# Patient Record
Sex: MALE | Race: WHITE | NOT HISPANIC OR LATINO | Employment: OTHER | URBAN - METROPOLITAN AREA
[De-identification: names, ages, dates, MRNs, and addresses within clinical notes are randomized per-mention and may not be internally consistent; named-entity substitution may affect disease eponyms.]

---

## 2023-03-16 ENCOUNTER — TELEMEDICINE (OUTPATIENT)
Dept: PHARMACY | Facility: HOSPITAL | Age: 63
End: 2023-03-16
Payer: COMMERCIAL

## 2023-03-16 DIAGNOSIS — Z79.4 TYPE 2 DIABETES MELLITUS WITHOUT COMPLICATION, WITH LONG-TERM CURRENT USE OF INSULIN (MULTI): ICD-10-CM

## 2023-03-16 DIAGNOSIS — E11.9 TYPE 2 DIABETES MELLITUS WITHOUT COMPLICATION, WITH LONG-TERM CURRENT USE OF INSULIN (MULTI): ICD-10-CM

## 2023-03-16 DIAGNOSIS — Z79.4 TYPE 2 DIABETES MELLITUS WITHOUT COMPLICATION, WITH LONG-TERM CURRENT USE OF INSULIN (MULTI): Primary | ICD-10-CM

## 2023-03-16 DIAGNOSIS — E11.9 TYPE 2 DIABETES MELLITUS WITHOUT COMPLICATION, WITH LONG-TERM CURRENT USE OF INSULIN (MULTI): Primary | ICD-10-CM

## 2023-03-16 RX ORDER — LANCETS 30 GAUGE
EACH MISCELLANEOUS
COMMUNITY
Start: 2023-02-22

## 2023-03-16 RX ORDER — DULAGLUTIDE 4.5 MG/.5ML
4.5 INJECTION, SOLUTION SUBCUTANEOUS
Qty: 2 ML | Refills: 11 | Status: SHIPPED | OUTPATIENT
Start: 2023-03-16 | End: 2023-03-16

## 2023-03-16 RX ORDER — EMPAGLIFLOZIN 25 MG/1
25 TABLET, FILM COATED ORAL DAILY
COMMUNITY
End: 2023-05-16 | Stop reason: SDUPTHER

## 2023-03-16 RX ORDER — DULAGLUTIDE 3 MG/.5ML
INJECTION, SOLUTION SUBCUTANEOUS
COMMUNITY
Start: 2023-02-17 | End: 2023-03-16 | Stop reason: DRUGHIGH

## 2023-03-16 RX ORDER — BLOOD SUGAR DIAGNOSTIC
STRIP MISCELLANEOUS
COMMUNITY
Start: 2023-02-22

## 2023-03-16 RX ORDER — NITROGLYCERIN 0.4 MG/1
TABLET SUBLINGUAL
COMMUNITY
Start: 2021-07-29

## 2023-03-16 RX ORDER — LANCETS 33 GAUGE
EACH MISCELLANEOUS
COMMUNITY
Start: 2023-02-22

## 2023-03-16 RX ORDER — ASPIRIN 81 MG/1
1 TABLET ORAL DAILY
COMMUNITY
Start: 2021-07-23

## 2023-03-16 RX ORDER — LOSARTAN POTASSIUM 100 MG/1
1 TABLET ORAL DAILY
COMMUNITY
Start: 2023-02-02 | End: 2023-11-14

## 2023-03-16 RX ORDER — INSULIN GLARGINE 100 [IU]/ML
10 INJECTION, SOLUTION SUBCUTANEOUS NIGHTLY
COMMUNITY
Start: 2021-07-23 | End: 2023-07-12 | Stop reason: ALTCHOICE

## 2023-03-16 RX ORDER — METOPROLOL TARTRATE 25 MG/1
25 TABLET, FILM COATED ORAL 2 TIMES DAILY
COMMUNITY
End: 2023-09-15

## 2023-03-16 RX ORDER — PEN NEEDLE, DIABETIC 32GX 5/32"
NEEDLE, DISPOSABLE MISCELLANEOUS
COMMUNITY
Start: 2022-08-30 | End: 2023-07-12 | Stop reason: ALTCHOICE

## 2023-03-16 RX ORDER — ATORVASTATIN CALCIUM 80 MG/1
80 TABLET, FILM COATED ORAL DAILY
COMMUNITY
End: 2024-06-04 | Stop reason: WASHOUT

## 2023-03-16 ASSESSMENT — ENCOUNTER SYMPTOMS: DIABETIC ASSOCIATED SYMPTOMS: 0

## 2023-03-16 NOTE — PROGRESS NOTES
Pharmacy Clinic Note    Hemant Ness is a 62 y.o. male was referred to Clinical Pharmacy Team for diabetes management.     Referring Provider: Jose Juan Mukherjee,     Subjective   Allergies   Allergen Reactions    Metformin Diarrhea       [facility] - [place], [place] - [address]  [address]  [address]  [facility] [address]  Phone: [phone] Fax: [phone]      Diabetes  He presents for his follow-up diabetic visit. He has type 2 diabetes mellitus. His disease course has been improving. There are no hypoglycemic associated symptoms. There are no diabetic associated symptoms. There are no hypoglycemic complications. There are no diabetic complications. Current diabetic treatment includes oral agent (dual therapy) and insulin injections (Trulicity 3 mg). His weight is decreasing steadily. His home blood glucose trend is decreasing steadily. His breakfast blood glucose range is generally 110-130 mg/dl. His dinner blood glucose range is generally  mg/dl. His overall blood glucose range is  mg/dl. An ACE inhibitor/angiotensin II receptor blocker is being taken.       Objective     There were no vitals taken for this visit.     LAB  Lab Results   Component Value Date    BILITOT 0.6 02/14/2023    CALCIUM 9.6 02/14/2023    CO2 26 02/14/2023     02/14/2023    CREATININE 0.91 02/14/2023    GLUCOSE 102 (H) 02/14/2023    ALKPHOS 73 02/14/2023    K 4.3 02/14/2023    PROT 7.5 02/14/2023     02/14/2023    AST 30 02/14/2023    ALT 44 02/14/2023    BUN 20 02/14/2023    ANIONGAP 12 02/14/2023    MG 1.89 07/22/2021    ALBUMIN 4.5 02/14/2023     Lab Results   Component Value Date    TRIG 81 10/25/2022    CHOL 118 10/25/2022    HDL 37.7 (A) 10/25/2022     Lab Results   Component Value Date    HGBA1C 6.2 (A) 02/14/2023       Current Outpatient Medications on File Prior to Visit   Medication Sig Dispense Refill    aspirin 81 mg EC tablet Take 1 tablet (81 mg) by mouth once  "daily.      Lantus Solostar U-100 Insulin 100 unit/mL (3 mL) pen Inject 10 Units under the skin once daily at bedtime.      losartan (Cozaar) 100 mg tablet Take 1 tablet (100 mg) by mouth once daily.      nitroglycerin (Nitrostat) 0.4 mg SL tablet Place under the tongue.      OneTouch Delica Plus Lancet 30 gauge misc       OneTouch Ultra Test strip       OneTouch Ultra2 Meter misc       Sure Comfort Pen Needle 32 gauge x \" needle TO BE USED ONCE DAILY      [DISCONTINUED] Trulicity 3 mg/0.5 mL pen injector       atorvastatin (Lipitor) 80 mg tablet Take 1 tablet (80 mg) by mouth once daily.      Jardiance 25 mg Take 1 tablet (25 mg) by mouth once daily.      metoprolol tartrate (Lopressor) 25 mg tablet Take 1 tablet (25 mg) by mouth in the morning and 1 tablet (25 mg) before bedtime.       No current facility-administered medications on file prior to visit.        HISTORICAL PHARMACOTHERAPY  - Metformin: diarrhea (moderate-severe)    SMBG (if applicable):    Date Morning Bedtime    107     131 101   1-Mar 145    2-Mar 115    6-Mar  98   7-Mar 106 82   8-Mar 117 85   9-Mar 95 96   10-Mar 94    11-Mar 100        DRUG INTERACTIONS  - No significant drug-drug interactions exist that require change in therapy  _______________________________________________________________________  PATIENT EDUCATION/GOALS    1. Discussed disease specific goals:   - Fasting B - 130 mg/dL  - Postprandial BG: less than 180 mg/dL  - A1c: less than 7%  2.  Since last visit, patient's BG have lowered to controlled range and patient has lost ~20 ls since lowering insulin and increasing Trulicity. Given this improvement, we will discontinue the Lantus and increase Trulicity to 4.5 mg.    SECONDARY PREVENTION  - Statin? yes  - ACE-I/ARB? yes  _______________________________________________________________________    Assessment/Plan   Problem List Items Addressed This Visit    None  Visit Diagnoses       Type 2 diabetes mellitus " without complication, with long-term current use of insulin (CMS/[facility])        Relevant Medications    dulaglutide (Trulicity) 4.5 mg/0.5 mL pen injector           PLAN  1. Increase Trulicity to 4.5 mg mg: Inject one pen under the skin once weekly. Rx sent to [facility].  2. Stop Lantus 10 units once daily.  3. Continue other diabetes medications.  4. Start Onetouch Ultra testing meter, strips, and lancets  Clinical Pharmacist follow-up: 3-4 weeks    Sushant Jessica, PharmD     Verbal consent to manage patient's drug therapy was obtained from [the patient and/or an individual authorized to act on behalf of a patient]. They were informed they may decline to participate or withdraw from participation in pharmacy services at any time.

## 2023-04-05 ENCOUNTER — TELEMEDICINE (OUTPATIENT)
Dept: PHARMACY | Facility: HOSPITAL | Age: 63
End: 2023-04-05
Payer: COMMERCIAL

## 2023-04-05 DIAGNOSIS — Z79.4 TYPE 2 DIABETES MELLITUS WITHOUT COMPLICATION, WITH LONG-TERM CURRENT USE OF INSULIN (MULTI): Primary | ICD-10-CM

## 2023-04-05 DIAGNOSIS — E11.9 TYPE 2 DIABETES MELLITUS WITHOUT COMPLICATION, WITH LONG-TERM CURRENT USE OF INSULIN (MULTI): Primary | ICD-10-CM

## 2023-04-05 DIAGNOSIS — Z79.4 TYPE 2 DIABETES MELLITUS WITHOUT COMPLICATION, WITH LONG-TERM CURRENT USE OF INSULIN (MULTI): ICD-10-CM

## 2023-04-05 DIAGNOSIS — E11.9 TYPE 2 DIABETES MELLITUS WITHOUT COMPLICATION, WITH LONG-TERM CURRENT USE OF INSULIN (MULTI): ICD-10-CM

## 2023-04-05 ASSESSMENT — ENCOUNTER SYMPTOMS: DIABETIC ASSOCIATED SYMPTOMS: 0

## 2023-04-05 NOTE — PROGRESS NOTES
Pharmacy Clinic Note    Hemant Ness is a 62 y.o. male was referred to Clinical Pharmacy Team for diabetes management.     Referring Provider: Jose Juan Mukherjee DO    Subjective   Allergies   Allergen Reactions    Metformin Diarrhea       [facility] - [place], [place] - [address]  [address]  [address]  [facility] [address]  Phone: [phone] Fax: [phone]      Diabetes  He presents for his follow-up diabetic visit. He has type 2 diabetes mellitus. His disease course has been improving. There are no hypoglycemic associated symptoms. There are no diabetic associated symptoms. There are no hypoglycemic complications. There are no diabetic complications. Risk factors for coronary artery disease include diabetes mellitus, male sex and obesity. Current diabetic treatment includes oral agent (monotherapy) (Trulicity 4.5 mg and Jardiance 25 mg). He is compliant with treatment all of the time. His weight is decreasing steadily. His home blood glucose trend is decreasing steadily. His breakfast blood glucose range is generally 110-130 mg/dl. His dinner blood glucose range is generally  mg/dl. His overall blood glucose range is  mg/dl. An ACE inhibitor/angiotensin II receptor blocker is being taken.       Objective     There were no vitals taken for this visit.     LAB  Lab Results   Component Value Date    BILITOT 0.6 02/14/2023    CALCIUM 9.6 02/14/2023    CO2 26 02/14/2023     02/14/2023    CREATININE 0.91 02/14/2023    GLUCOSE 102 (H) 02/14/2023    ALKPHOS 73 02/14/2023    K 4.3 02/14/2023    PROT 7.5 02/14/2023     02/14/2023    AST 30 02/14/2023    ALT 44 02/14/2023    BUN 20 02/14/2023    ANIONGAP 12 02/14/2023    MG 1.89 07/22/2021    ALBUMIN 4.5 02/14/2023     Lab Results   Component Value Date    TRIG 81 10/25/2022    CHOL 118 10/25/2022    HDL 37.7 (A) 10/25/2022     Lab Results   Component Value Date    HGBA1C 6.2 (A) 02/14/2023       Current  "Outpatient Medications on File Prior to Visit   Medication Sig Dispense Refill    aspirin 81 mg EC tablet Take 1 tablet (81 mg) by mouth once daily.      atorvastatin (Lipitor) 80 mg tablet Take 1 tablet (80 mg) by mouth once daily.      dulaglutide (Trulicity) 4.5 mg/0.5 mL pen injector Inject 4.5 mg under the skin every 7 days. 2 mL 11    Jardiance 25 mg Take 1 tablet (25 mg) by mouth once daily.      Lantus Solostar U-100 Insulin 100 unit/mL (3 mL) pen Inject 10 Units under the skin once daily at bedtime.      losartan (Cozaar) 100 mg tablet Take 1 tablet (100 mg) by mouth once daily.      metoprolol tartrate (Lopressor) 25 mg tablet Take 1 tablet (25 mg) by mouth in the morning and 1 tablet (25 mg) before bedtime.      nitroglycerin (Nitrostat) 0.4 mg SL tablet Place under the tongue.      OneTouch Delica Plus Lancet 30 gauge misc       OneTouch Ultra Test strip       OneTouch Ultra2 Meter misc       Sure Comfort Pen Needle 32 gauge x 5/32\" needle TO BE USED ONCE DAILY       No current facility-administered medications on file prior to visit.        HISTORICAL PHARMACOTHERAPY  - Metformin: diarrhea (moderate-severe)    SMBG (if applicable):    Date Morning Bedtime   20-Mar 102 ---   24-Mar --- 103   25-Mar 103 ---   26-Mar --- ---   27-Mar 116 ---    98     Weight down to 236 lbs (previously 260 lbs)    DRUG INTERACTIONS  - No significant drug-drug interactions exist that require change in therapy  _______________________________________________________________________  PATIENT EDUCATION/GOALS    1. Discussed disease specific goals:   - Fasting B - 130 mg/dL  - Postprandial BG: less than 180 mg/dL  - A1c: less than 7%    2.  Since last visit, patient's BG remain controlled without need for Lantus. Trulicity set up for automatic refill via [facility] mail order.    SECONDARY PREVENTION  - Statin? yes  - ACE-I/ARB? " yes  _______________________________________________________________________    Assessment/Plan   Problem List Items Addressed This Visit    None  Visit Diagnoses       Type 2 diabetes mellitus without complication, with long-term current use of insulin (CMS/[facility])               PLAN  1. Continue Jardiance 25 mg and Trulcity 4.5 mg    Clinical Pharmacist follow-up: 3 months    Sushant Jessica, PharmD     Verbal consent to manage patient's drug therapy was obtained from [the patient and/or an individual authorized to act on behalf of a patient]. They were informed they may decline to participate or withdraw from participation in pharmacy services at any time.

## 2023-05-16 DIAGNOSIS — Z86.39 HISTORY OF NON-INSULIN DEPENDENT TYPE 2 DIABETES MELLITUS: ICD-10-CM

## 2023-05-16 RX ORDER — EMPAGLIFLOZIN 25 MG/1
25 TABLET, FILM COATED ORAL DAILY
Qty: 90 TABLET | Refills: 3 | Status: SHIPPED | OUTPATIENT
Start: 2023-05-16 | End: 2023-05-16

## 2023-07-12 ENCOUNTER — TELEMEDICINE (OUTPATIENT)
Dept: PHARMACY | Facility: HOSPITAL | Age: 63
End: 2023-07-12
Payer: COMMERCIAL

## 2023-07-12 DIAGNOSIS — Z79.4 TYPE 2 DIABETES MELLITUS WITHOUT COMPLICATION, WITH LONG-TERM CURRENT USE OF INSULIN (MULTI): ICD-10-CM

## 2023-07-12 DIAGNOSIS — E11.9 TYPE 2 DIABETES MELLITUS WITHOUT COMPLICATION, WITH LONG-TERM CURRENT USE OF INSULIN (MULTI): ICD-10-CM

## 2023-07-12 ASSESSMENT — ENCOUNTER SYMPTOMS: DIABETIC ASSOCIATED SYMPTOMS: 0

## 2023-07-12 NOTE — PROGRESS NOTES
Pharmacy Clinic Note    Hemant Ness is a 62 y.o. male was referred to Clinical Pharmacy Team for diabetes management.     Referring Provider: Jose Juan Mukherjee DO    Subjective   Allergies   Allergen Reactions    Metformin Diarrhea       [facility] - [place], [place] - [address]  [address]  [address]  [facility] [address]  Phone: [phone] Fax: [phone]      Diabetes  He presents for his follow-up diabetic visit. He has type 2 diabetes mellitus. His disease course has been improving. There are no hypoglycemic associated symptoms. There are no diabetic associated symptoms. There are no hypoglycemic complications. There are no diabetic complications. Risk factors for coronary artery disease include diabetes mellitus, male sex and obesity. Current diabetic treatment includes oral agent (monotherapy) (Trulicity 4.5 mg and Jardiance 25 mg). He is compliant with treatment all of the time. His weight is decreasing steadily. His home blood glucose trend is decreasing steadily. His breakfast blood glucose range is generally 110-130 mg/dl. His dinner blood glucose range is generally  mg/dl. His overall blood glucose range is  mg/dl. An ACE inhibitor/angiotensin II receptor blocker is being taken.       Objective     There were no vitals taken for this visit.     LAB  Lab Results   Component Value Date    BILITOT 0.6 02/14/2023    CALCIUM 9.6 02/14/2023    CO2 26 02/14/2023     02/14/2023    CREATININE 0.91 02/14/2023    GLUCOSE 102 (H) 02/14/2023    ALKPHOS 73 02/14/2023    K 4.3 02/14/2023    PROT 7.5 02/14/2023     02/14/2023    AST 30 02/14/2023    ALT 44 02/14/2023    BUN 20 02/14/2023    ANIONGAP 12 02/14/2023    MG 1.89 07/22/2021    ALBUMIN 4.5 02/14/2023     Lab Results   Component Value Date    TRIG 81 10/25/2022    CHOL 118 10/25/2022    HDL 37.7 (A) 10/25/2022     Lab Results   Component Value Date    HGBA1C 6.2 (A) 02/14/2023       Current  "Outpatient Medications on File Prior to Visit   Medication Sig Dispense Refill    aspirin 81 mg EC tablet Take 1 tablet (81 mg) by mouth once daily.      atorvastatin (Lipitor) 80 mg tablet Take 1 tablet (80 mg) by mouth once daily.      dulaglutide (Trulicity) 4.5 mg/0.5 mL pen injector Inject 4.5 mg under the skin every 7 days. 2 mL 11    Jardiance 25 mg Take 1 tablet (25 mg) by mouth once daily. 90 tablet 3    losartan (Cozaar) 100 mg tablet Take 1 tablet (100 mg) by mouth once daily.      metoprolol tartrate (Lopressor) 25 mg tablet Take 1 tablet (25 mg) by mouth in the morning and 1 tablet (25 mg) before bedtime.      nitroglycerin (Nitrostat) 0.4 mg SL tablet Place under the tongue.      OneTouch Delica Plus Lancet 30 gauge misc       OneTouch Ultra Test strip       OneTouch Ultra2 Meter misc       [DISCONTINUED] Lantus Solostar U-100 Insulin 100 unit/mL (3 mL) pen Inject 10 Units under the skin once daily at bedtime.      [DISCONTINUED] Sure Comfort Pen Needle 32 gauge x \" needle TO BE USED ONCE DAILY       No current facility-administered medications on file prior to visit.        HISTORICAL PHARMACOTHERAPY  - Metformin: diarrhea (moderate-severe)    Weight down to 230 lbs (previously 260 lbs)    DRUG INTERACTIONS  - No significant drug-drug interactions exist that require change in therapy  _______________________________________________________________________  PATIENT EDUCATION/GOALS    1. Discussed disease specific goals:   - Fasting B - 130 mg/dL  - Postprandial BG: less than 180 mg/dL  - A1c: less than 7%    2.  Since last visit, patient's BG remain controlled without need for Lantus. Patient to continue current therapies of Jardiance 25 mg and Trulicity 4.5 mg and follow up in 6 months    SECONDARY PREVENTION  - Statin? yes  - ACE-I/ARB? yes  _______________________________________________________________________    Assessment/Plan   Problem List Items Addressed This Visit          " Endocrine/Metabolic    Type 2 diabetes mellitus without complication, with long-term current use of insulin (CMS/[facility])     PLAN  1. Continue Jardiance 25 mg and Trulcity 4.5 mg           Continue all meds under the continuation of care with the referring provider and clinical pharmacy team.    Clinical Pharmacist follow-up: 6 months    Sushant Jessica, PharmD     Verbal consent to manage patient's drug therapy was obtained from [the patient and/or an individual authorized to act on behalf of a patient]. They were informed they may decline to participate or withdraw from participation in pharmacy services at any time.

## 2023-08-31 ENCOUNTER — TELEPHONE (OUTPATIENT)
Dept: PRIMARY CARE | Facility: CLINIC | Age: 63
End: 2023-08-31
Payer: COMMERCIAL

## 2023-08-31 DIAGNOSIS — R39.11 BENIGN PROSTATIC HYPERPLASIA WITH URINARY HESITANCY: ICD-10-CM

## 2023-08-31 DIAGNOSIS — E11.9 TYPE 2 DIABETES MELLITUS WITHOUT COMPLICATION, WITH LONG-TERM CURRENT USE OF INSULIN (MULTI): Primary | ICD-10-CM

## 2023-08-31 DIAGNOSIS — Z79.4 TYPE 2 DIABETES MELLITUS WITHOUT COMPLICATION, WITH LONG-TERM CURRENT USE OF INSULIN (MULTI): Primary | ICD-10-CM

## 2023-08-31 DIAGNOSIS — N40.1 BENIGN PROSTATIC HYPERPLASIA WITH URINARY HESITANCY: ICD-10-CM

## 2023-08-31 RX ORDER — LISINOPRIL 10 MG/1
10 TABLET ORAL DAILY
COMMUNITY
Start: 2011-11-30 | End: 2023-09-15 | Stop reason: ALTCHOICE

## 2023-09-07 PROBLEM — R97.20 BPH WITH ELEVATED PSA: Status: ACTIVE | Noted: 2023-09-07

## 2023-09-07 PROBLEM — F17.200 CURRENT SMOKER: Status: ACTIVE | Noted: 2023-09-07

## 2023-09-07 PROBLEM — I21.3 STEMI (ST ELEVATION MYOCARDIAL INFARCTION) (MULTI): Status: ACTIVE | Noted: 2023-09-07

## 2023-09-07 PROBLEM — E78.5 DYSLIPIDEMIA, GOAL LDL BELOW 70: Status: ACTIVE | Noted: 2023-09-07

## 2023-09-07 PROBLEM — L57.8 ACTINIC SKIN DAMAGE: Status: ACTIVE | Noted: 2023-09-07

## 2023-09-07 PROBLEM — G47.30 SLEEP-DISORDERED BREATHING: Status: ACTIVE | Noted: 2023-09-07

## 2023-09-07 PROBLEM — N40.0 BPH WITH ELEVATED PSA: Status: ACTIVE | Noted: 2023-09-07

## 2023-09-07 PROBLEM — I25.10 CAD (CORONARY ARTERY DISEASE): Status: ACTIVE | Noted: 2023-09-07

## 2023-09-07 PROBLEM — I10 HYPERTENSION, BENIGN: Status: ACTIVE | Noted: 2023-09-07

## 2023-09-07 PROBLEM — F10.20 UNCOMPLICATED ALCOHOL DEPENDENCE (MULTI): Status: ACTIVE | Noted: 2023-09-07

## 2023-09-07 PROBLEM — L57.0 ACTINIC KERATOSES: Status: ACTIVE | Noted: 2023-09-07

## 2023-09-08 ENCOUNTER — LAB (OUTPATIENT)
Dept: LAB | Facility: LAB | Age: 63
End: 2023-09-08
Payer: COMMERCIAL

## 2023-09-08 DIAGNOSIS — N40.1 BENIGN PROSTATIC HYPERPLASIA WITH URINARY HESITANCY: ICD-10-CM

## 2023-09-08 DIAGNOSIS — R39.11 BENIGN PROSTATIC HYPERPLASIA WITH URINARY HESITANCY: ICD-10-CM

## 2023-09-08 LAB
ALANINE AMINOTRANSFERASE (SGPT) (U/L) IN SER/PLAS: 42 U/L (ref 10–52)
ALBUMIN (G/DL) IN SER/PLAS: 4.3 G/DL (ref 3.4–5)
ALBUMIN (MG/L) IN URINE: 27.6 MG/L
ALBUMIN/CREATININE (UG/MG) IN URINE: 19 UG/MG CRT (ref 0–30)
ALKALINE PHOSPHATASE (U/L) IN SER/PLAS: 75 U/L (ref 33–136)
ANION GAP IN SER/PLAS: 14 MMOL/L (ref 10–20)
ASPARTATE AMINOTRANSFERASE (SGOT) (U/L) IN SER/PLAS: 30 U/L (ref 9–39)
BILIRUBIN TOTAL (MG/DL) IN SER/PLAS: 0.5 MG/DL (ref 0–1.2)
CALCIUM (MG/DL) IN SER/PLAS: 9.1 MG/DL (ref 8.6–10.3)
CARBON DIOXIDE, TOTAL (MMOL/L) IN SER/PLAS: 22 MMOL/L (ref 21–32)
CHLORIDE (MMOL/L) IN SER/PLAS: 108 MMOL/L (ref 98–107)
CHOLESTEROL (MG/DL) IN SER/PLAS: 103 MG/DL (ref 0–199)
CHOLESTEROL IN HDL (MG/DL) IN SER/PLAS: 35.1 MG/DL
CHOLESTEROL/HDL RATIO: 2.9
CREATININE (MG/DL) IN SER/PLAS: 0.99 MG/DL (ref 0.5–1.3)
CREATININE (MG/DL) IN URINE: 145 MG/DL (ref 20–370)
ESTIMATED AVERAGE GLUCOSE FOR HBA1C: 131 MG/DL
GFR MALE: 85 ML/MIN/1.73M2
GLUCOSE (MG/DL) IN SER/PLAS: 125 MG/DL (ref 74–99)
HEMOGLOBIN A1C/HEMOGLOBIN TOTAL IN BLOOD: 6.2 %
LDL: 52 MG/DL (ref 0–99)
POTASSIUM (MMOL/L) IN SER/PLAS: 4.5 MMOL/L (ref 3.5–5.3)
PROSTATE SPECIFIC AG (NG/ML) IN SER/PLAS: 5.69 NG/ML (ref 0–4)
PROTEIN TOTAL: 6.9 G/DL (ref 6.4–8.2)
SODIUM (MMOL/L) IN SER/PLAS: 139 MMOL/L (ref 136–145)
TRIGLYCERIDE (MG/DL) IN SER/PLAS: 78 MG/DL (ref 0–149)
UREA NITROGEN (MG/DL) IN SER/PLAS: 11 MG/DL (ref 6–23)
VLDL: 16 MG/DL (ref 0–40)

## 2023-09-08 PROCEDURE — 82043 UR ALBUMIN QUANTITATIVE: CPT

## 2023-09-08 PROCEDURE — 82570 ASSAY OF URINE CREATININE: CPT

## 2023-09-08 PROCEDURE — 36415 COLL VENOUS BLD VENIPUNCTURE: CPT

## 2023-09-08 PROCEDURE — 80061 LIPID PANEL: CPT

## 2023-09-08 PROCEDURE — 83036 HEMOGLOBIN GLYCOSYLATED A1C: CPT

## 2023-09-08 PROCEDURE — 84153 ASSAY OF PSA TOTAL: CPT

## 2023-09-08 PROCEDURE — 80053 COMPREHEN METABOLIC PANEL: CPT

## 2023-09-15 ENCOUNTER — OFFICE VISIT (OUTPATIENT)
Dept: PRIMARY CARE | Facility: CLINIC | Age: 63
End: 2023-09-15
Payer: COMMERCIAL

## 2023-09-15 VITALS
HEART RATE: 68 BPM | BODY MASS INDEX: 32.93 KG/M2 | SYSTOLIC BLOOD PRESSURE: 130 MMHG | HEIGHT: 70 IN | DIASTOLIC BLOOD PRESSURE: 84 MMHG | WEIGHT: 230 LBS

## 2023-09-15 DIAGNOSIS — E11.65 TYPE 2 DIABETES MELLITUS WITH HYPERGLYCEMIA, WITHOUT LONG-TERM CURRENT USE OF INSULIN (MULTI): Primary | ICD-10-CM

## 2023-09-15 DIAGNOSIS — E11.42 DIABETIC POLYNEUROPATHY ASSOCIATED WITH TYPE 2 DIABETES MELLITUS (MULTI): ICD-10-CM

## 2023-09-15 DIAGNOSIS — R97.20 BPH WITH ELEVATED PSA: ICD-10-CM

## 2023-09-15 DIAGNOSIS — I21.02 ST ELEVATION MYOCARDIAL INFARCTION INVOLVING LEFT ANTERIOR DESCENDING (LAD) CORONARY ARTERY (MULTI): ICD-10-CM

## 2023-09-15 DIAGNOSIS — G47.30 SLEEP-DISORDERED BREATHING: ICD-10-CM

## 2023-09-15 DIAGNOSIS — I10 HYPERTENSION, BENIGN: ICD-10-CM

## 2023-09-15 DIAGNOSIS — N40.0 BPH WITH ELEVATED PSA: ICD-10-CM

## 2023-09-15 DIAGNOSIS — E11.51 DIABETES MELLITUS TYPE 2 WITH PERIPHERAL ARTERY DISEASE (MULTI): ICD-10-CM

## 2023-09-15 DIAGNOSIS — E78.5 DYSLIPIDEMIA, GOAL LDL BELOW 70: ICD-10-CM

## 2023-09-15 DIAGNOSIS — F17.200 CURRENT SMOKER: ICD-10-CM

## 2023-09-15 DIAGNOSIS — F10.20 UNCOMPLICATED ALCOHOL DEPENDENCE (MULTI): ICD-10-CM

## 2023-09-15 PROBLEM — Z79.4 TYPE 2 DIABETES MELLITUS WITHOUT COMPLICATION, WITH LONG-TERM CURRENT USE OF INSULIN (MULTI): Status: RESOLVED | Noted: 2023-07-12 | Resolved: 2023-09-15

## 2023-09-15 PROBLEM — E11.9 TYPE 2 DIABETES MELLITUS WITHOUT COMPLICATION, WITH LONG-TERM CURRENT USE OF INSULIN (MULTI): Status: RESOLVED | Noted: 2023-07-12 | Resolved: 2023-09-15

## 2023-09-15 PROCEDURE — 3079F DIAST BP 80-89 MM HG: CPT | Performed by: INTERNAL MEDICINE

## 2023-09-15 PROCEDURE — 4010F ACE/ARB THERAPY RXD/TAKEN: CPT | Performed by: INTERNAL MEDICINE

## 2023-09-15 PROCEDURE — 3044F HG A1C LEVEL LT 7.0%: CPT | Performed by: INTERNAL MEDICINE

## 2023-09-15 PROCEDURE — 99214 OFFICE O/P EST MOD 30 MIN: CPT | Performed by: INTERNAL MEDICINE

## 2023-09-15 PROCEDURE — 3075F SYST BP GE 130 - 139MM HG: CPT | Performed by: INTERNAL MEDICINE

## 2023-09-15 RX ORDER — GABAPENTIN 100 MG/1
100 CAPSULE ORAL NIGHTLY
Qty: 90 CAPSULE | Refills: 3 | Status: SHIPPED | OUTPATIENT
Start: 2023-09-15 | End: 2024-04-16 | Stop reason: SINTOL

## 2023-09-15 RX ORDER — VARENICLINE TARTRATE 0.5 (11)-1
0.5 KIT ORAL 2 TIMES DAILY
Qty: 53 EACH | Refills: 0 | Status: SHIPPED | OUTPATIENT
Start: 2023-09-15 | End: 2024-04-16 | Stop reason: ALTCHOICE

## 2023-09-15 RX ORDER — METOPROLOL SUCCINATE 100 MG/1
100 TABLET, EXTENDED RELEASE ORAL DAILY
Qty: 90 TABLET | Refills: 3 | Status: SHIPPED | OUTPATIENT
Start: 2023-09-15 | End: 2024-09-14

## 2023-09-15 ASSESSMENT — ENCOUNTER SYMPTOMS
LOSS OF SENSATION IN FEET: 0
OCCASIONAL FEELINGS OF UNSTEADINESS: 0
DEPRESSION: 0

## 2023-09-15 ASSESSMENT — PATIENT HEALTH QUESTIONNAIRE - PHQ9
SUM OF ALL RESPONSES TO PHQ9 QUESTIONS 1 AND 2: 0
2. FEELING DOWN, DEPRESSED OR HOPELESS: NOT AT ALL
1. LITTLE INTEREST OR PLEASURE IN DOING THINGS: NOT AT ALL

## 2023-09-15 ASSESSMENT — ANXIETY QUESTIONNAIRES
6. BECOMING EASILY ANNOYED OR IRRITABLE: NOT AT ALL
7. FEELING AFRAID AS IF SOMETHING AWFUL MIGHT HAPPEN: NOT AT ALL
GAD7 TOTAL SCORE: 0
IF YOU CHECKED OFF ANY PROBLEMS ON THIS QUESTIONNAIRE, HOW DIFFICULT HAVE THESE PROBLEMS MADE IT FOR YOU TO DO YOUR WORK, TAKE CARE OF THINGS AT HOME, OR GET ALONG WITH OTHER PEOPLE: NOT DIFFICULT AT ALL
2. NOT BEING ABLE TO STOP OR CONTROL WORRYING: NOT AT ALL
1. FEELING NERVOUS, ANXIOUS, OR ON EDGE: NOT AT ALL
5. BEING SO RESTLESS THAT IT IS HARD TO SIT STILL: NOT AT ALL
3. WORRYING TOO MUCH ABOUT DIFFERENT THINGS: NOT AT ALL
4. TROUBLE RELAXING: NOT AT ALL

## 2023-09-15 NOTE — ASSESSMENT & PLAN NOTE
In addition to alcohol cessation considers home sleep testing patient not wish to undergo testing for this at this time denies apnea or daytime hypersomnolence improved with weight loss and reduction in alcohol intake continue

## 2023-09-15 NOTE — PROGRESS NOTES
"Subjective   Patient ID: Hemant Ness is a 62 y.o. male who presents for Annual Exam.    HPI     Review of Systems    Objective   /90 (BP Location: Left arm, Patient Position: Sitting)   Pulse 68   Ht 1.778 m (5' 10\")   Wt 104 kg (230 lb)   BMI 33.00 kg/m²     Physical Exam    Assessment/Plan          "

## 2023-09-15 NOTE — ASSESSMENT & PLAN NOTE
[place] and diet Coke 2-5 drinks a week continue to encourage alcohol reduction and cessation for continued weight loss reduction and hypertension reduction and risk of malignancy related to GI cancers no signs or symptoms of cirrhosis at this time

## 2023-09-15 NOTE — ASSESSMENT & PLAN NOTE
Improved but suboptimal continue losartan 100 mg daily patient admits to not taking metoprolol twice a day due to forgetting second pill will replace with metoprolol succinate ER titrate to 100 mg daily for cardiovascular benefit and reevaluate in 3 months strongly urged smoking and alcohol cessation

## 2023-09-15 NOTE — ASSESSMENT & PLAN NOTE
Patient ready for action phase of quitting smoking currently smoking 1 pack of cigarettes a day okay for initiating therapy with Chantix starter pack we will also consult smoking cessation educational services and reevaluate in 3 months patient defers all vaccinations at this time but strongly encouraged to obtain influenza vaccine COVID-vaccine shingles vaccine and RSV vaccineAt some point we will evaluate patient for COPD with mild symptoms of chronic mucus production and shortness of breath reevaluate next visit

## 2023-09-15 NOTE — ASSESSMENT & PLAN NOTE
Rising PSA denies family history of prostate cancer referral to urologist for prostate biopsy and evaluation

## 2023-09-15 NOTE — ASSESSMENT & PLAN NOTE
Optimal LDL cholesterol of 52 triglycerides 78 low HDL of 35 related to smoking continue maximal dose atorvastatin 80 mg daily

## 2023-09-15 NOTE — ASSESSMENT & PLAN NOTE
Patient with reduced circulation cold feet and skin changes of lower legs suggestive of peripheral arterial disease with reduced pulses patient denies severe claudication symptoms strongly urged smoking cessation regular exercise as tolerated and alcohol cessation continue baby aspirin await results of abdominal aortic aneurysm screening

## 2023-09-15 NOTE — ASSESSMENT & PLAN NOTE
Hemoglobin A1c improved to 6.2 optimized on Jardiance 25 mg daily with Trulicity 4.5 mg weekly and 19 pound weight loss since last year doing very well continue patient has evidence of mild diabetic neuropathy and exam concerning for early peripheral arterial disease work on smoking cessation and will start trial of gabapentin 100 mg nightly reevaluate symptom management when he returns

## 2023-09-15 NOTE — PROGRESS NOTES
"Subjective   Reason for Visit: Hemant Ness is an 62 y.o. male here for a  visit.     Past Medical, Surgical, and Family History reviewed and updated in chart.    Reviewed all medications by prescribing practitioner or clinical pharmacist (such as prescriptions, OTCs, herbal therapies and supplements) and documented in the medical record.    HPI    Patient Care Team:  Jose Juan Mukherjee MD as PCP - General  Jose Juan Mukherjee MD as PCP - [facility] PCP     Review of Systems   All other systems reviewed and are negative.      Objective   Vitals:  /84   Pulse 68   Ht 1.778 m (5' 10\")   Wt 104 kg (230 lb)   BMI 33.00 kg/m²       Physical Exam  Vitals and nursing note reviewed.   Constitutional:       General: He is not in acute distress.     Appearance: Normal appearance. He is well-developed. He is obese. He is not toxic-appearing.   HENT:      Head: Normocephalic and atraumatic.      Right Ear: Tympanic membrane and external ear normal.      Left Ear: Tympanic membrane and external ear normal.      Nose: Nose normal.      Mouth/Throat:      Mouth: Mucous membranes are moist.      Pharynx: Oropharynx is clear. No oropharyngeal exudate or posterior oropharyngeal erythema.      Tonsils: No tonsillar exudate. 2+ on the right. 2+ on the left.   Eyes:      Extraocular Movements: Extraocular movements intact.      Conjunctiva/sclera: Conjunctivae normal.   Cardiovascular:      Rate and Rhythm: Normal rate and regular rhythm.      Pulses: Normal pulses.      Heart sounds: Normal heart sounds. No murmur heard.  Pulmonary:      Effort: Pulmonary effort is normal.      Breath sounds: Normal breath sounds.   Abdominal:      General: Abdomen is flat. Bowel sounds are normal.      Palpations: Abdomen is soft.   Musculoskeletal:      Cervical back: Neck supple.   Feet:      Right foot:      Skin integrity: Skin integrity normal. No ulcer, blister, skin breakdown, erythema, warmth or callus.      Toenail Condition: " Right toenails are normal.      Left foot:      Skin integrity: Skin integrity normal. No ulcer, blister, skin breakdown, erythema, warmth or callus.      Toenail Condition: Left toenails are normal.   Lymphadenopathy:      Cervical: No cervical adenopathy.   Skin:     General: Skin is warm and dry.      Capillary Refill: Capillary refill takes more than 3 seconds.      Findings: No rash.   Neurological:      Mental Status: He is alert. Mental status is at baseline.      Sensory: Sensation is intact.   Psychiatric:         Mood and Affect: Mood normal.         Behavior: Behavior normal.         Thought Content: Thought content normal.         Judgment: Judgment normal.         Assessment/Plan   Problem List Items Addressed This Visit       Type 2 diabetes mellitus with hyperglycemia, without long-term current use of insulin (CMS/[facility]) - Primary    Current Assessment & Plan     Hemoglobin A1c improved to 6.2 optimized on Jardiance 25 mg daily with Trulicity 4.5 mg weekly and 19 pound weight loss since last year doing very well continue patient has evidence of mild diabetic neuropathy and exam concerning for early peripheral arterial disease work on smoking cessation and will start trial of gabapentin 100 mg nightly reevaluate symptom management when he returns         Relevant Medications    metoprolol succinate XL (Toprol XL) 100 mg 24 hr tablet    gabapentin (Neurontin) 100 mg capsule    varenicline (Chantix Starting Month Box) 0.5 mg (11)- 1 mg (42) tablet    Other Relevant Orders    Referral to Tobacco Cessation Counseling    Referral to Urology    Follow Up In [facility] - PCP - Established    BPH with elevated PSA    Current Assessment & Plan     Rising PSA denies family history of prostate cancer referral to urologist for prostate biopsy and evaluation         Relevant Medications    metoprolol succinate XL (Toprol XL) 100 mg 24 hr tablet    gabapentin (Neurontin) 100 mg capsule    varenicline  (Chantix Starting Month Box) 0.5 mg (11)- 1 mg (42) tablet    Other Relevant Orders    Referral to Tobacco Cessation Counseling    Referral to Urology    Follow Up In [facility] - PCP - Established    Current smoker    Current Assessment & Plan     Patient ready for action phase of quitting smoking currently smoking 1 pack of cigarettes a day okay for initiating therapy with Chantix starter pack we will also consult smoking cessation educational services and reevaluate in 3 months patient defers all vaccinations at this time but strongly encouraged to obtain influenza vaccine COVID-vaccine shingles vaccine and RSV vaccineAt some point we will evaluate patient for COPD with mild symptoms of chronic mucus production and shortness of breath reevaluate next visit         Relevant Medications    metoprolol succinate XL (Toprol XL) 100 mg 24 hr tablet    gabapentin (Neurontin) 100 mg capsule    varenicline (Chantix Starting Month Box) 0.5 mg (11)- 1 mg (42) tablet    Other Relevant Orders    Referral to Tobacco Cessation Counseling    Referral to Urology    Follow Up In [facility] - PCP - Established    Dyslipidemia, goal LDL below 70    Current Assessment & Plan     Optimal LDL cholesterol of 52 triglycerides 78 low HDL of 35 related to smoking continue maximal dose atorvastatin 80 mg daily         Hypertension, benign    Current Assessment & Plan     Improved but suboptimal continue losartan 100 mg daily patient admits to not taking metoprolol twice a day due to forgetting second pill will replace with metoprolol succinate ER titrate to 100 mg daily for cardiovascular benefit and reevaluate in 3 months strongly urged smoking and alcohol cessation         Relevant Medications    metoprolol succinate XL (Toprol XL) 100 mg 24 hr tablet    gabapentin (Neurontin) 100 mg capsule    varenicline (Chantix Starting Month Box) 0.5 mg (11)- 1 mg (42) tablet    Other Relevant Orders    Referral to Tobacco  Cessation Counseling    Referral to Urology    Follow Up In [facility] - PCP - Established    Sleep-disordered breathing    Current Assessment & Plan     In addition to alcohol cessation considers home sleep testing patient not wish to undergo testing for this at this time denies apnea or daytime hypersomnolence improved with weight loss and reduction in alcohol intake continue         STEMI (ST elevation myocardial infarction) (CMS/[facility])    Current Assessment & Plan     Stable at this time continue baby aspirin no changes from cardiologist at this time         Relevant Medications    metoprolol succinate XL (Toprol XL) 100 mg 24 hr tablet    gabapentin (Neurontin) 100 mg capsule    varenicline (Chantix Starting Month Box) 0.5 mg (11)- 1 mg (42) tablet    Other Relevant Orders    Referral to Tobacco Cessation Counseling    Referral to Urology    Follow Up In [facility] - PCP - Established    Uncomplicated alcohol dependence (CMS/[facility])    Current Assessment & Plan     [place]iddings and diet Coke 2-5 drinks a week continue to encourage alcohol reduction and cessation for continued weight loss reduction and hypertension reduction and risk of malignancy related to GI cancers no signs or symptoms of cirrhosis at this time         Relevant Medications    metoprolol succinate XL (Toprol XL) 100 mg 24 hr tablet    gabapentin (Neurontin) 100 mg capsule    varenicline (Chantix Starting Month Box) 0.5 mg (11)- 1 mg (42) tablet    Other Relevant Orders    Referral to Tobacco Cessation Counseling    Referral to Urology    Follow Up In [facility] - PCP - Established    Diabetic polyneuropathy associated with type 2 diabetes mellitus (CMS/[facility])    Current Assessment & Plan     Start gabapentin 100 mg nightly and reassess         Diabetes mellitus type 2 with peripheral artery disease (CMS/[facility])    Current Assessment & Plan     Patient with reduced circulation cold feet and skin changes of lower legs  suggestive of peripheral arterial disease with reduced pulses patient denies severe claudication symptoms strongly urged smoking cessation regular exercise as tolerated and alcohol cessation continue baby aspirin await results of abdominal aortic aneurysm screening

## 2023-10-09 ENCOUNTER — PHARMACY VISIT (OUTPATIENT)
Dept: PHARMACY | Facility: CLINIC | Age: 63
End: 2023-10-09
Payer: COMMERCIAL

## 2023-10-09 PROCEDURE — RXMED WILLOW AMBULATORY MEDICATION CHARGE

## 2023-10-10 NOTE — PROGRESS NOTES
[facility] - Dr. Polo Chapman    New Patient Visit    PCP: Jose Juan Mukherjee DO    Chief Complaint/Reason for visit: elevated psa, BPH    HPI:   Elevated PSA  No family history  No new back or bone pain  No agent orange  Good erectile function  Lab Results   Component Value Date    PSA 5.69 (H) 09/08/2023    PSA 3.60 07/22/2022    PSA 4.47 (H) 04/01/2022     2. BPH with LUTS  IPSS = 7 (nocturia x3)  No home meds  Urinalysis today in the office demonstrates no overt evidence of urinary tract infection.     Past Medical History:   Diagnosis Date    Hyperlipidemia, unspecified 12/17/2021    Dyslipidemia    Personal history of other endocrine, nutritional and metabolic disease 12/17/2021    History of diabetes mellitus    Personal history of other endocrine, nutritional and metabolic disease 12/17/2021    History of type 2 diabetes mellitus    Personal history of other endocrine, nutritional and metabolic disease 04/08/2022    History of type 2 diabetes mellitus    Personal history of other specified conditions 09/10/2021    History of chest pain     Past Surgical History:   Procedure Laterality Date    OTHER SURGICAL HISTORY  07/23/2021    Cardiac catheterization with stent placement    OTHER SURGICAL HISTORY  07/29/2021    Cyst excision    OTHER SURGICAL HISTORY  07/29/2021    Cholecystectomy    OTHER SURGICAL HISTORY  07/29/2021    Shoulder surgery     Social History     Socioeconomic History    Marital status:      Spouse name: Not on file    Number of children: Not on file    Years of education: Not on file    Highest education level: Not on file   Occupational History    Not on file   Tobacco Use    Smoking status: Every Day     Types: Cigarettes    Smokeless tobacco: Never   Vaping Use    Vaping Use: Never used   Substance and Sexual Activity    Alcohol use: Yes    Drug use: Never    Sexual activity: Yes   Other Topics Concern    Not on file   Social History Narrative    Not on file     Social  Determinants of Health     Financial Resource Strain: Not on file   Food Insecurity: Not on file   Transportation Needs: Not on file   Physical Activity: Not on file   Stress: Not on file   Social Connections: Not on file   Intimate Partner Violence: Not on file   Housing Stability: Not on file     Current Outpatient Medications   Medication Instructions    aspirin 81 mg EC tablet 1 tablet, oral, Daily    atorvastatin (LIPITOR) 80 mg, oral, Daily    dulaglutide (TRULICITY) 4.5 mg/0.5 mL pen injector INJECT ONE PEN (4.5 MG) UNDER THE SKIN ONCE WEEKLY    empagliflozin (Jardiance) 25 mg TAKE 1 TABLET (25 MG) BY MOUTH ONCE DAILY.    gabapentin (NEURONTIN) 100 mg, oral, Nightly    losartan (Cozaar) 100 mg tablet 1 tablet, oral, Daily    metoprolol succinate XL (TOPROL XL) 100 mg, oral, Daily, Do not crush or chew.    nitroglycerin (Nitrostat) 0.4 mg SL tablet sublingual    OneTouch Delica Plus Lancet 30 gauge misc     OneTouch Ultra Test strip     OneTouch Ultra2 Meter [facility]     varenicline (Chantix Starting Month Box) 0.5 mg (11)- 1 mg (42) tablet 0.5 mg, oral, 2 times daily     Allergies   Allergen Reactions    Metformin Diarrhea          Physical Exam:  General: Alert, cooperative, no acute distress  Eyes: Sclera clear  Cardiac: Extremities are warm and well perfused  Lungs: Breathing non-labored. Speaking in clear and complete sentences.  MSK: Ambulatory with steady gait   Neuro: Alert and oriented to person, place, and time  Psych: Normal mood and affect  Skin: No obvious lesions or rashes  SERA: Firm, 40g, deep seated, cannot fully palpate base, no nodules    Assessment and Plan:  1. Elevated PSA  I discussed with the patient that prostate cancer is the most common internal malignancy in men in the [place], and that around 12% of [place] men will be diagnosed with prostate cancer in their lifetime. I also explained that the lifetime risk of dying from prostate cancer, however, is only 3%. I discussed that PSA is a  screening test to assess the risk of prostate cancer but is not diagnostic of prostate cancer, and that tissue biopsy is needed to confirm the diagnosis.    - MR prostate; Future    Follow up after MRI   Ativan Rx sent for anxiety related to MRI    2. BPH with obstruction/lower urinary tract symptoms  Currently happy, monitor

## 2023-10-11 ENCOUNTER — PHARMACY VISIT (OUTPATIENT)
Dept: PHARMACY | Facility: CLINIC | Age: 63
End: 2023-10-11
Payer: COMMERCIAL

## 2023-10-11 ENCOUNTER — OFFICE VISIT (OUTPATIENT)
Dept: UROLOGY | Facility: CLINIC | Age: 63
End: 2023-10-11
Payer: COMMERCIAL

## 2023-10-11 VITALS — SYSTOLIC BLOOD PRESSURE: 121 MMHG | DIASTOLIC BLOOD PRESSURE: 87 MMHG | HEART RATE: 83 BPM

## 2023-10-11 DIAGNOSIS — N40.1 BPH WITH OBSTRUCTION/LOWER URINARY TRACT SYMPTOMS: ICD-10-CM

## 2023-10-11 DIAGNOSIS — R97.20 ELEVATED PSA: Primary | ICD-10-CM

## 2023-10-11 DIAGNOSIS — N13.8 BPH WITH OBSTRUCTION/LOWER URINARY TRACT SYMPTOMS: ICD-10-CM

## 2023-10-11 LAB
POC APPEARANCE, URINE: ABNORMAL
POC BILIRUBIN, URINE: NEGATIVE
POC BLOOD, URINE: NEGATIVE
POC COLOR, URINE: ABNORMAL
POC GLUCOSE, URINE: ABNORMAL MG/DL
POC KETONES, URINE: NEGATIVE MG/DL
POC LEUKOCYTES, URINE: NEGATIVE
POC NITRITE,URINE: NEGATIVE
POC PH, URINE: 6.5 PH
POC PROTEIN, URINE: NEGATIVE MG/DL
POC SPECIFIC GRAVITY, URINE: 1.02
POC UROBILINOGEN, URINE: 0.2 EU/DL

## 2023-10-11 PROCEDURE — 1160F RVW MEDS BY RX/DR IN RCRD: CPT | Performed by: STUDENT IN AN ORGANIZED HEALTH CARE EDUCATION/TRAINING PROGRAM

## 2023-10-11 PROCEDURE — 3079F DIAST BP 80-89 MM HG: CPT | Performed by: STUDENT IN AN ORGANIZED HEALTH CARE EDUCATION/TRAINING PROGRAM

## 2023-10-11 PROCEDURE — 4010F ACE/ARB THERAPY RXD/TAKEN: CPT | Performed by: STUDENT IN AN ORGANIZED HEALTH CARE EDUCATION/TRAINING PROGRAM

## 2023-10-11 PROCEDURE — RXMED WILLOW AMBULATORY MEDICATION CHARGE

## 2023-10-11 PROCEDURE — 1159F MED LIST DOCD IN RCRD: CPT | Performed by: STUDENT IN AN ORGANIZED HEALTH CARE EDUCATION/TRAINING PROGRAM

## 2023-10-11 PROCEDURE — 3074F SYST BP LT 130 MM HG: CPT | Performed by: STUDENT IN AN ORGANIZED HEALTH CARE EDUCATION/TRAINING PROGRAM

## 2023-10-11 PROCEDURE — 99204 OFFICE O/P NEW MOD 45 MIN: CPT | Performed by: STUDENT IN AN ORGANIZED HEALTH CARE EDUCATION/TRAINING PROGRAM

## 2023-10-11 PROCEDURE — 3044F HG A1C LEVEL LT 7.0%: CPT | Performed by: STUDENT IN AN ORGANIZED HEALTH CARE EDUCATION/TRAINING PROGRAM

## 2023-10-11 RX ORDER — LORAZEPAM 1 MG/1
1 TABLET ORAL
Qty: 2 TABLET | Refills: 0 | Status: SHIPPED | OUTPATIENT
Start: 2023-10-11 | End: 2024-04-16 | Stop reason: ALTCHOICE

## 2023-10-11 NOTE — PATIENT INSTRUCTIONS
I discussed with the patient that prostate cancer is the most common internal malignancy in men in the [place], and that around 12% of [place] men will be diagnosed with prostate cancer in their lifetime. I also explained that the lifetime risk of dying from prostate cancer, however, is only 3%. I discussed that PSA is a screening test to assess the risk of prostate cancer but is not diagnostic of prostate cancer, and that tissue biopsy is needed to confirm the diagnosis.

## 2023-11-04 ENCOUNTER — PHARMACY VISIT (OUTPATIENT)
Dept: PHARMACY | Facility: CLINIC | Age: 63
End: 2023-11-04
Payer: COMMERCIAL

## 2023-11-04 PROCEDURE — RXMED WILLOW AMBULATORY MEDICATION CHARGE

## 2023-11-05 ENCOUNTER — PHARMACY VISIT (OUTPATIENT)
Dept: PHARMACY | Facility: CLINIC | Age: 63
End: 2023-11-05
Payer: COMMERCIAL

## 2023-11-05 PROCEDURE — RXMED WILLOW AMBULATORY MEDICATION CHARGE

## 2023-11-14 DIAGNOSIS — I10 HYPERTENSION, BENIGN: ICD-10-CM

## 2023-11-14 RX ORDER — LOSARTAN POTASSIUM 100 MG/1
100 TABLET ORAL DAILY
Qty: 90 TABLET | Refills: 0 | Status: SHIPPED | OUTPATIENT
Start: 2023-11-14 | End: 2024-02-01

## 2023-12-03 ENCOUNTER — PHARMACY VISIT (OUTPATIENT)
Dept: PHARMACY | Facility: CLINIC | Age: 63
End: 2023-12-03
Payer: COMMERCIAL

## 2023-12-03 PROCEDURE — RXMED WILLOW AMBULATORY MEDICATION CHARGE

## 2023-12-27 ENCOUNTER — APPOINTMENT (OUTPATIENT)
Dept: PRIMARY CARE | Facility: CLINIC | Age: 63
End: 2023-12-27
Payer: COMMERCIAL

## 2024-01-01 PROCEDURE — RXMED WILLOW AMBULATORY MEDICATION CHARGE

## 2024-01-03 ENCOUNTER — PHARMACY VISIT (OUTPATIENT)
Dept: PHARMACY | Facility: CLINIC | Age: 64
End: 2024-01-03
Payer: COMMERCIAL

## 2024-01-04 ENCOUNTER — APPOINTMENT (OUTPATIENT)
Dept: PRIMARY CARE | Facility: CLINIC | Age: 64
End: 2024-01-04
Payer: COMMERCIAL

## 2024-01-25 PROCEDURE — RXMED WILLOW AMBULATORY MEDICATION CHARGE

## 2024-01-29 ENCOUNTER — PHARMACY VISIT (OUTPATIENT)
Dept: PHARMACY | Facility: CLINIC | Age: 64
End: 2024-01-29
Payer: COMMERCIAL

## 2024-01-29 PROCEDURE — RXMED WILLOW AMBULATORY MEDICATION CHARGE

## 2024-01-30 ENCOUNTER — PHARMACY VISIT (OUTPATIENT)
Dept: PHARMACY | Facility: CLINIC | Age: 64
End: 2024-01-30
Payer: COMMERCIAL

## 2024-01-31 ENCOUNTER — TELEMEDICINE (OUTPATIENT)
Dept: PHARMACY | Facility: HOSPITAL | Age: 64
End: 2024-01-31
Payer: COMMERCIAL

## 2024-01-31 DIAGNOSIS — E11.9 TYPE 2 DIABETES MELLITUS WITHOUT COMPLICATION, WITH LONG-TERM CURRENT USE OF INSULIN (MULTI): ICD-10-CM

## 2024-01-31 DIAGNOSIS — E11.65 TYPE 2 DIABETES MELLITUS WITH HYPERGLYCEMIA, WITHOUT LONG-TERM CURRENT USE OF INSULIN (MULTI): Primary | ICD-10-CM

## 2024-01-31 DIAGNOSIS — Z86.39 HISTORY OF NON-INSULIN DEPENDENT TYPE 2 DIABETES MELLITUS: ICD-10-CM

## 2024-01-31 DIAGNOSIS — Z79.4 TYPE 2 DIABETES MELLITUS WITHOUT COMPLICATION, WITH LONG-TERM CURRENT USE OF INSULIN (MULTI): ICD-10-CM

## 2024-01-31 ASSESSMENT — ENCOUNTER SYMPTOMS: DIABETIC ASSOCIATED SYMPTOMS: 0

## 2024-01-31 NOTE — PROGRESS NOTES
Pharmacy Clinic Note    Hemant Ness is a 63 y.o. male was referred to Clinical Pharmacy Team for diabetes management.     Referring Provider: Jose Juan Mukherjee,     Subjective   Allergies   Allergen Reactions    Metformin Diarrhea       [facility]  [address], [address]  [facility] [address]  Phone: [phone] Fax: [phone]    [facility] - [place], [place] - [address]  [address]  [address]  [facility] [address]  Phone: [phone] Fax: [phone]    [id number] - [place], [place] - [address]  [address] [facility] [address]  Phone: [phone] Fax: [phone]      Diabetes  He presents for his follow-up diabetic visit. He has type 2 diabetes mellitus. His disease course has been improving. There are no hypoglycemic associated symptoms. There are no diabetic associated symptoms. There are no hypoglycemic complications. There are no diabetic complications. Risk factors for coronary artery disease include diabetes mellitus, male sex and obesity. Current diabetic treatment includes oral agent (monotherapy) (Trulicity 4.5 mg and Jardiance 25 mg). He is compliant with treatment all of the time. His weight is decreasing steadily. His home blood glucose trend is decreasing steadily. His breakfast blood glucose range is generally 110-130 mg/dl. His dinner blood glucose range is generally  mg/dl. His overall blood glucose range is  mg/dl. An ACE inhibitor/angiotensin II receptor blocker is being taken.       Objective     There were no vitals taken for this visit.     LAB  Lab Results   Component Value Date    BILITOT 0.5 09/08/2023    CALCIUM 9.1 09/08/2023    CO2 22 09/08/2023     (H) 09/08/2023    CREATININE 0.99 09/08/2023    GLUCOSE 125 (H) 09/08/2023    ALKPHOS 75 09/08/2023    K 4.5 09/08/2023    PROT 6.9 09/08/2023     09/08/2023    AST 30 09/08/2023    ALT 42 09/08/2023    BUN 11 09/08/2023     ANIONGAP 14 09/08/2023    MG 1.89 07/22/2021    ALBUMIN 4.3 09/08/2023     Lab Results   Component Value Date    TRIG 78 09/08/2023    CHOL 103 09/08/2023    HDL 35.1 (A) 09/08/2023     Lab Results   Component Value Date    HGBA1C 6.2 (A) 09/08/2023       Current Outpatient Medications on File Prior to Visit   Medication Sig Dispense Refill    aspirin 81 mg EC tablet Take 1 tablet (81 mg) by mouth once daily.      atorvastatin (Lipitor) 80 mg tablet Take 1 tablet (80 mg) by mouth once daily.      gabapentin (Neurontin) 100 mg capsule Take 1 capsule (100 mg) by mouth once daily at bedtime. 90 capsule 3    LORazepam (Ativan) 1 mg tablet Take 1 tablet (1 mg) by mouth 1 time if needed for anxiety (1 hour before MRI; can repeat dose 15 mins prior if needed) for up to 1 dose. 2 tablet 0    losartan (Cozaar) 100 mg tablet TAKE ONE TABLET BY MOUTH DAILY 90 tablet 0    metoprolol succinate XL (Toprol XL) 100 mg 24 hr tablet Take 1 tablet (100 mg) by mouth once daily. Do not crush or chew. 90 tablet 3    nitroglycerin (Nitrostat) 0.4 mg SL tablet Place under the tongue.      OneTouch Delica Plus Lancet 30 gauge misc       OneTouch Ultra Test strip       OneTouch Ultra2 Meter misc       varenicline (Chantix Starting Month Box) 0.5 mg (11)- 1 mg (42) tablet Take 0.5 mg by mouth 2 times a day. 53 each 0    [DISCONTINUED] dulaglutide (TRULICITY) 4.5 mg/0.5 mL pen injector INJECT ONE PEN (4.5 MG) UNDER THE SKIN ONCE WEEKLY 2 mL 11    [DISCONTINUED] empagliflozin (Jardiance) 25 mg TAKE 1 TABLET (25 MG) BY MOUTH ONCE DAILY. 90 tablet 3     No current facility-administered medications on file prior to visit.        HISTORICAL PHARMACOTHERAPY  - Metformin: diarrhea (moderate-severe)    Weight down to 230 lbs (previously 260 lbs)    DRUG INTERACTIONS  - No significant drug-drug interactions exist that require change in therapy  _______________________________________________________________________  PATIENT EDUCATION/GOALS    1.  Discussed disease specific goals:   - Fasting B - 130 mg/dL  - Postprandial BG: less than 180 mg/dL  - A1c: less than 7%    2.  Since last visit, patient's BG remain wekk controlled on Trulicity andd Jardiance. Patient to continue current therapies of Jardiance 25 mg and Trulicity 4.5 mg and follow up in 6 months    SECONDARY PREVENTION  - Statin? yes  - ACE-I/ARB? yes  _______________________________________________________________________    Assessment/Plan   Problem List Items Addressed This Visit       Type 2 diabetes mellitus with hyperglycemia, without long-term current use of insulin (CMS/[facility]) - Primary     PLAN  1. Continue Jardiance 25 mg and Trulcity 4.5 mg         Relevant Medications    dulaglutide (TRULICITY) 4.5 mg/0.5 mL pen injector     Other Visit Diagnoses       Type 2 diabetes mellitus without complication, with long-term current use of insulin (CMS/[facility])        Relevant Medications    dulaglutide (TRULICITY) 4.5 mg/0.5 mL pen injector    History of non-insulin dependent type 2 diabetes mellitus        Relevant Medications    empagliflozin (Jardiance) 25 mg           Continue all meds under the continuation of care with the referring provider and clinical pharmacy team.    Clinical Pharmacist follow-up: 6 months    Sushant Jessica, PharmMARIO     Verbal consent to manage patient's drug therapy was obtained from [the patient and/or an individual authorized to act on behalf of a patient]. They were informed they may decline to participate or withdraw from participation in pharmacy services at any time.

## 2024-02-01 DIAGNOSIS — I10 HYPERTENSION, BENIGN: ICD-10-CM

## 2024-02-01 RX ORDER — LOSARTAN POTASSIUM 100 MG/1
100 TABLET ORAL DAILY
Qty: 90 TABLET | Refills: 0 | Status: SHIPPED | OUTPATIENT
Start: 2024-02-01 | End: 2024-06-03

## 2024-02-21 ENCOUNTER — PHARMACY VISIT (OUTPATIENT)
Dept: PHARMACY | Facility: CLINIC | Age: 64
End: 2024-02-21
Payer: COMMERCIAL

## 2024-02-21 PROCEDURE — RXMED WILLOW AMBULATORY MEDICATION CHARGE

## 2024-02-29 ENCOUNTER — TELEPHONE (OUTPATIENT)
Dept: PRIMARY CARE | Facility: CLINIC | Age: 64
End: 2024-02-29
Payer: COMMERCIAL

## 2024-02-29 DIAGNOSIS — E11.69 DYSLIPIDEMIA ASSOCIATED WITH TYPE 2 DIABETES MELLITUS (MULTI): ICD-10-CM

## 2024-02-29 DIAGNOSIS — F10.20 UNCOMPLICATED ALCOHOL DEPENDENCE (MULTI): ICD-10-CM

## 2024-02-29 DIAGNOSIS — E11.42 DIABETIC POLYNEUROPATHY ASSOCIATED WITH TYPE 2 DIABETES MELLITUS (MULTI): ICD-10-CM

## 2024-02-29 DIAGNOSIS — R97.20 BPH WITH ELEVATED PSA: ICD-10-CM

## 2024-02-29 DIAGNOSIS — E78.5 DYSLIPIDEMIA ASSOCIATED WITH TYPE 2 DIABETES MELLITUS (MULTI): ICD-10-CM

## 2024-02-29 DIAGNOSIS — I21.21 ST ELEVATION MYOCARDIAL INFARCTION INVOLVING LEFT CIRCUMFLEX CORONARY ARTERY (MULTI): ICD-10-CM

## 2024-02-29 DIAGNOSIS — E11.51 DIABETES MELLITUS TYPE 2 WITH PERIPHERAL ARTERY DISEASE (MULTI): ICD-10-CM

## 2024-02-29 DIAGNOSIS — E11.65 TYPE 2 DIABETES MELLITUS WITH HYPERGLYCEMIA, WITHOUT LONG-TERM CURRENT USE OF INSULIN (MULTI): Primary | ICD-10-CM

## 2024-02-29 DIAGNOSIS — I50.32 HYPERTENSIVE HEART DISEASE WITH CHRONIC DIASTOLIC CONGESTIVE HEART FAILURE (MULTI): ICD-10-CM

## 2024-02-29 DIAGNOSIS — I11.0 HYPERTENSIVE HEART DISEASE WITH CHRONIC DIASTOLIC CONGESTIVE HEART FAILURE (MULTI): ICD-10-CM

## 2024-02-29 DIAGNOSIS — N40.0 BPH WITH ELEVATED PSA: ICD-10-CM

## 2024-02-29 NOTE — TELEPHONE ENCOUNTER
Jose Juan Mukherjee, DO Sondra Adames, CMA; SC[id number] Clinical Support Staff  Caller: Unspecified (Today,  2:13 PM)  Lab orders given for tomorrow fasting

## 2024-02-29 NOTE — TELEPHONE ENCOUNTER
He would like lab orders put in for his appointment on 3/8/24.  Please let him know if this can't be done. He wants to get them done tomorrow

## 2024-03-04 ENCOUNTER — LAB (OUTPATIENT)
Dept: LAB | Facility: LAB | Age: 64
End: 2024-03-04
Payer: COMMERCIAL

## 2024-03-04 DIAGNOSIS — E78.5 DYSLIPIDEMIA ASSOCIATED WITH TYPE 2 DIABETES MELLITUS (MULTI): ICD-10-CM

## 2024-03-04 DIAGNOSIS — E11.42 DIABETIC POLYNEUROPATHY ASSOCIATED WITH TYPE 2 DIABETES MELLITUS (MULTI): ICD-10-CM

## 2024-03-04 DIAGNOSIS — N40.0 BPH WITH ELEVATED PSA: ICD-10-CM

## 2024-03-04 DIAGNOSIS — E11.69 DYSLIPIDEMIA ASSOCIATED WITH TYPE 2 DIABETES MELLITUS (MULTI): ICD-10-CM

## 2024-03-04 DIAGNOSIS — I11.0 HYPERTENSIVE HEART DISEASE WITH CHRONIC DIASTOLIC CONGESTIVE HEART FAILURE (MULTI): ICD-10-CM

## 2024-03-04 DIAGNOSIS — I50.32 HYPERTENSIVE HEART DISEASE WITH CHRONIC DIASTOLIC CONGESTIVE HEART FAILURE (MULTI): ICD-10-CM

## 2024-03-04 DIAGNOSIS — E11.65 TYPE 2 DIABETES MELLITUS WITH HYPERGLYCEMIA, WITHOUT LONG-TERM CURRENT USE OF INSULIN (MULTI): ICD-10-CM

## 2024-03-04 DIAGNOSIS — E11.51 DIABETES MELLITUS TYPE 2 WITH PERIPHERAL ARTERY DISEASE (MULTI): ICD-10-CM

## 2024-03-04 DIAGNOSIS — R97.20 BPH WITH ELEVATED PSA: ICD-10-CM

## 2024-03-04 LAB
ALBUMIN SERPL BCP-MCNC: 4.4 G/DL (ref 3.4–5)
ALP SERPL-CCNC: 80 U/L (ref 33–136)
ALT SERPL W P-5'-P-CCNC: 35 U/L (ref 10–52)
ANION GAP SERPL CALC-SCNC: 14 MMOL/L (ref 10–20)
AST SERPL W P-5'-P-CCNC: 29 U/L (ref 9–39)
BASOPHILS # BLD AUTO: 0.04 X10*3/UL (ref 0–0.1)
BASOPHILS NFR BLD AUTO: 0.5 %
BILIRUB SERPL-MCNC: 0.8 MG/DL (ref 0–1.2)
BUN SERPL-MCNC: 17 MG/DL (ref 6–23)
CALCIUM SERPL-MCNC: 9.5 MG/DL (ref 8.6–10.3)
CHLORIDE SERPL-SCNC: 103 MMOL/L (ref 98–107)
CHOLEST SERPL-MCNC: 142 MG/DL (ref 0–199)
CHOLESTEROL/HDL RATIO: 3.7
CO2 SERPL-SCNC: 23 MMOL/L (ref 21–32)
CREAT SERPL-MCNC: 0.96 MG/DL (ref 0.5–1.3)
CREAT UR-MCNC: 137.9 MG/DL (ref 20–370)
EGFRCR SERPLBLD CKD-EPI 2021: 88 ML/MIN/1.73M*2
EOSINOPHIL # BLD AUTO: 0.08 X10*3/UL (ref 0–0.7)
EOSINOPHIL NFR BLD AUTO: 1.1 %
ERYTHROCYTE [DISTWIDTH] IN BLOOD BY AUTOMATED COUNT: 13.1 % (ref 11.5–14.5)
EST. AVERAGE GLUCOSE BLD GHB EST-MCNC: 137 MG/DL
GLUCOSE SERPL-MCNC: 121 MG/DL (ref 74–99)
HBA1C MFR BLD: 6.4 %
HCT VFR BLD AUTO: 49.4 % (ref 41–52)
HCV AB SER QL: REACTIVE
HDLC SERPL-MCNC: 38.3 MG/DL
HGB BLD-MCNC: 16.6 G/DL (ref 13.5–17.5)
IMM GRANULOCYTES # BLD AUTO: 0.09 X10*3/UL (ref 0–0.7)
IMM GRANULOCYTES NFR BLD AUTO: 1.2 % (ref 0–0.9)
LDLC SERPL CALC-MCNC: 81 MG/DL
LYMPHOCYTES # BLD AUTO: 2.12 X10*3/UL (ref 1.2–4.8)
LYMPHOCYTES NFR BLD AUTO: 28.4 %
MCH RBC QN AUTO: 35.5 PG (ref 26–34)
MCHC RBC AUTO-ENTMCNC: 33.6 G/DL (ref 32–36)
MCV RBC AUTO: 106 FL (ref 80–100)
MICROALBUMIN UR-MCNC: 82.1 MG/L
MICROALBUMIN/CREAT UR: 59.5 UG/MG CREAT
MONOCYTES # BLD AUTO: 0.83 X10*3/UL (ref 0.1–1)
MONOCYTES NFR BLD AUTO: 11.1 %
NEUTROPHILS # BLD AUTO: 4.31 X10*3/UL (ref 1.2–7.7)
NEUTROPHILS NFR BLD AUTO: 57.7 %
NON HDL CHOLESTEROL: 104 MG/DL (ref 0–149)
NRBC BLD-RTO: 0 /100 WBCS (ref 0–0)
PLATELET # BLD AUTO: 144 X10*3/UL (ref 150–450)
POTASSIUM SERPL-SCNC: 4.4 MMOL/L (ref 3.5–5.3)
PROT SERPL-MCNC: 7.1 G/DL (ref 6.4–8.2)
PSA SERPL-MCNC: 7.9 NG/ML
RBC # BLD AUTO: 4.68 X10*6/UL (ref 4.5–5.9)
SODIUM SERPL-SCNC: 136 MMOL/L (ref 136–145)
TRIGL SERPL-MCNC: 112 MG/DL (ref 0–149)
VIT B12 SERPL-MCNC: 207 PG/ML (ref 211–911)
VLDL: 22 MG/DL (ref 0–40)
WBC # BLD AUTO: 7.5 X10*3/UL (ref 4.4–11.3)

## 2024-03-04 PROCEDURE — 83036 HEMOGLOBIN GLYCOSYLATED A1C: CPT

## 2024-03-04 PROCEDURE — 82570 ASSAY OF URINE CREATININE: CPT

## 2024-03-04 PROCEDURE — 82043 UR ALBUMIN QUANTITATIVE: CPT

## 2024-03-04 PROCEDURE — 36415 COLL VENOUS BLD VENIPUNCTURE: CPT

## 2024-03-04 PROCEDURE — 82607 VITAMIN B-12: CPT

## 2024-03-04 PROCEDURE — 85025 COMPLETE CBC W/AUTO DIFF WBC: CPT

## 2024-03-04 PROCEDURE — 86803 HEPATITIS C AB TEST: CPT

## 2024-03-04 PROCEDURE — 87522 HEPATITIS C REVRS TRNSCRPJ: CPT

## 2024-03-04 PROCEDURE — 80053 COMPREHEN METABOLIC PANEL: CPT

## 2024-03-04 PROCEDURE — 84153 ASSAY OF PSA TOTAL: CPT

## 2024-03-04 PROCEDURE — 80061 LIPID PANEL: CPT

## 2024-03-05 LAB
HCV RNA SERPL NAA+PROBE-ACNC: ABNORMAL IU/ML
HCV RNA SERPL NAA+PROBE-ACNC: DETECTED K[IU]/ML
HCV RNA SERPL NAA+PROBE-LOG IU: 7.23 LOG IU/ML

## 2024-03-08 ENCOUNTER — APPOINTMENT (OUTPATIENT)
Dept: PRIMARY CARE | Facility: CLINIC | Age: 64
End: 2024-03-08
Payer: COMMERCIAL

## 2024-03-20 ENCOUNTER — TELEPHONE (OUTPATIENT)
Dept: PRIMARY CARE | Facility: CLINIC | Age: 64
End: 2024-03-20
Payer: COMMERCIAL

## 2024-03-20 PROCEDURE — RXMED WILLOW AMBULATORY MEDICATION CHARGE

## 2024-03-22 NOTE — TELEPHONE ENCOUNTER
Patient called asking when he can be seen. He is very concerned about the lab results he has received.

## 2024-03-26 ENCOUNTER — PHARMACY VISIT (OUTPATIENT)
Dept: PHARMACY | Facility: CLINIC | Age: 64
End: 2024-03-26
Payer: COMMERCIAL

## 2024-04-15 ENCOUNTER — OFFICE VISIT (OUTPATIENT)
Dept: PRIMARY CARE | Facility: CLINIC | Age: 64
End: 2024-04-15
Payer: COMMERCIAL

## 2024-04-15 ENCOUNTER — SPECIALTY PHARMACY (OUTPATIENT)
Dept: PHARMACY | Facility: CLINIC | Age: 64
End: 2024-04-15

## 2024-04-15 VITALS
SYSTOLIC BLOOD PRESSURE: 140 MMHG | HEART RATE: 80 BPM | BODY MASS INDEX: 34.36 KG/M2 | WEIGHT: 240 LBS | HEIGHT: 70 IN | DIASTOLIC BLOOD PRESSURE: 86 MMHG

## 2024-04-15 DIAGNOSIS — K74.60 HEPATIC CIRRHOSIS DUE TO CHRONIC HEPATITIS C INFECTION (MULTI): ICD-10-CM

## 2024-04-15 DIAGNOSIS — E11.51 DIABETES MELLITUS TYPE 2 WITH PERIPHERAL ARTERY DISEASE (MULTI): ICD-10-CM

## 2024-04-15 DIAGNOSIS — E11.42 DIABETIC POLYNEUROPATHY ASSOCIATED WITH TYPE 2 DIABETES MELLITUS (MULTI): ICD-10-CM

## 2024-04-15 DIAGNOSIS — I50.32 HYPERTENSIVE HEART DISEASE WITH CHRONIC DIASTOLIC CONGESTIVE HEART FAILURE (MULTI): ICD-10-CM

## 2024-04-15 DIAGNOSIS — R97.20 BPH WITH ELEVATED PSA: ICD-10-CM

## 2024-04-15 DIAGNOSIS — R16.0 LIVER MASS: ICD-10-CM

## 2024-04-15 DIAGNOSIS — E11.69 DYSLIPIDEMIA ASSOCIATED WITH TYPE 2 DIABETES MELLITUS (MULTI): ICD-10-CM

## 2024-04-15 DIAGNOSIS — I11.0 HYPERTENSIVE HEART DISEASE WITH CHRONIC DIASTOLIC CONGESTIVE HEART FAILURE (MULTI): ICD-10-CM

## 2024-04-15 DIAGNOSIS — N40.0 BPH WITH ELEVATED PSA: ICD-10-CM

## 2024-04-15 DIAGNOSIS — B18.2 CHRONIC HEPATITIS C WITHOUT HEPATIC COMA (MULTI): Primary | ICD-10-CM

## 2024-04-15 DIAGNOSIS — B18.2 HEPATIC CIRRHOSIS DUE TO CHRONIC HEPATITIS C INFECTION (MULTI): ICD-10-CM

## 2024-04-15 DIAGNOSIS — E78.5 DYSLIPIDEMIA ASSOCIATED WITH TYPE 2 DIABETES MELLITUS (MULTI): ICD-10-CM

## 2024-04-15 DIAGNOSIS — Z23 NEED FOR HEPATITIS A AND B VACCINATION: ICD-10-CM

## 2024-04-15 DIAGNOSIS — E11.65 TYPE 2 DIABETES MELLITUS WITH HYPERGLYCEMIA, WITHOUT LONG-TERM CURRENT USE OF INSULIN (MULTI): ICD-10-CM

## 2024-04-15 DIAGNOSIS — F10.20 UNCOMPLICATED ALCOHOL DEPENDENCE (MULTI): ICD-10-CM

## 2024-04-15 PROCEDURE — 3048F LDL-C <100 MG/DL: CPT | Performed by: INTERNAL MEDICINE

## 2024-04-15 PROCEDURE — 90636 HEP A/HEP B VACC ADULT IM: CPT | Performed by: INTERNAL MEDICINE

## 2024-04-15 PROCEDURE — 1159F MED LIST DOCD IN RCRD: CPT | Performed by: INTERNAL MEDICINE

## 2024-04-15 PROCEDURE — 3060F POS MICROALBUMINURIA REV: CPT | Performed by: INTERNAL MEDICINE

## 2024-04-15 PROCEDURE — 3077F SYST BP >= 140 MM HG: CPT | Performed by: INTERNAL MEDICINE

## 2024-04-15 PROCEDURE — 4010F ACE/ARB THERAPY RXD/TAKEN: CPT | Performed by: INTERNAL MEDICINE

## 2024-04-15 PROCEDURE — 90471 IMMUNIZATION ADMIN: CPT | Performed by: INTERNAL MEDICINE

## 2024-04-15 PROCEDURE — 3079F DIAST BP 80-89 MM HG: CPT | Performed by: INTERNAL MEDICINE

## 2024-04-15 PROCEDURE — 1160F RVW MEDS BY RX/DR IN RCRD: CPT | Performed by: INTERNAL MEDICINE

## 2024-04-15 PROCEDURE — 99214 OFFICE O/P EST MOD 30 MIN: CPT | Performed by: INTERNAL MEDICINE

## 2024-04-15 PROCEDURE — 3044F HG A1C LEVEL LT 7.0%: CPT | Performed by: INTERNAL MEDICINE

## 2024-04-15 ASSESSMENT — ANXIETY QUESTIONNAIRES
3. WORRYING TOO MUCH ABOUT DIFFERENT THINGS: NOT AT ALL
7. FEELING AFRAID AS IF SOMETHING AWFUL MIGHT HAPPEN: NOT AT ALL
4. TROUBLE RELAXING: NOT AT ALL
1. FEELING NERVOUS, ANXIOUS, OR ON EDGE: NOT AT ALL
IF YOU CHECKED OFF ANY PROBLEMS ON THIS QUESTIONNAIRE, HOW DIFFICULT HAVE THESE PROBLEMS MADE IT FOR YOU TO DO YOUR WORK, TAKE CARE OF THINGS AT HOME, OR GET ALONG WITH OTHER PEOPLE: NOT DIFFICULT AT ALL
6. BECOMING EASILY ANNOYED OR IRRITABLE: NOT AT ALL
GAD7 TOTAL SCORE: 0
5. BEING SO RESTLESS THAT IT IS HARD TO SIT STILL: NOT AT ALL
2. NOT BEING ABLE TO STOP OR CONTROL WORRYING: NOT AT ALL

## 2024-04-15 ASSESSMENT — COLUMBIA-SUICIDE SEVERITY RATING SCALE - C-SSRS
1. IN THE PAST MONTH, HAVE YOU WISHED YOU WERE DEAD OR WISHED YOU COULD GO TO SLEEP AND NOT WAKE UP?: NO
6. HAVE YOU EVER DONE ANYTHING, STARTED TO DO ANYTHING, OR PREPARED TO DO ANYTHING TO END YOUR LIFE?: NO
2. HAVE YOU ACTUALLY HAD ANY THOUGHTS OF KILLING YOURSELF?: NO

## 2024-04-15 ASSESSMENT — ENCOUNTER SYMPTOMS
LOSS OF SENSATION IN FEET: 0
DEPRESSION: 0
OCCASIONAL FEELINGS OF UNSTEADINESS: 0

## 2024-04-15 NOTE — PROGRESS NOTES
"Subjective   Patient ID: Hemant Ness is a 63 y.o. male who presents for Follow-up.    HPI     Review of Systems    Objective   /86 (BP Location: Left arm, Patient Position: Sitting)   Pulse 80   Ht 1.778 m (5' 10\")   Wt 109 kg (240 lb)   BMI 34.44 kg/m²     Physical Exam    Assessment/Plan          " Detail Level: Simple Comment: Today’s Visit- Patient was switched to duloxetine & lyrica and is well controlled on those medications with the exception of flares due to increased stress as of late with 's advancing Parkingson's and behavior changes \\n\\nLOV-Patient to follow up with primary care to discuss switching from gabapentin to Lyrica. Advised patient that if she would like us to treat we would need a copy of her latest labs and we will send the prescription thereafter if she so chooses. Pain still 6/10 while on gabapentin 300 mg BID. Render Risk Assessment In Note?: yes

## 2024-04-16 PROBLEM — I21.3 STEMI (ST ELEVATION MYOCARDIAL INFARCTION) (MULTI): Status: RESOLVED | Noted: 2023-09-07 | Resolved: 2024-04-16

## 2024-04-16 PROCEDURE — RXMED WILLOW AMBULATORY MEDICATION CHARGE

## 2024-04-16 NOTE — ASSESSMENT & PLAN NOTE
Evaluated by urologist elevated PSA has order for MRI referral to urology for further evaluation and biopsy

## 2024-04-16 NOTE — ASSESSMENT & PLAN NOTE
Alcohol abstinence and must with initiation of treatment of chronic hepatitis C evaluate for cirrhosis risk

## 2024-04-16 NOTE — ASSESSMENT & PLAN NOTE
Patient diagnosed with screening will start medical therapy with Mavyret.  Given first dose of hepatitis a and B vaccination will screen for hepatitis A B.  Check ultrasound of liver and AFP to evaluate cirrhosis risk with blood work follow-up in 1 month

## 2024-04-16 NOTE — ADDENDUM NOTE
Addended by: SYLVIA ELLIS on: 4/16/2024 08:05 AM     Modules accepted: Orders     Called and spoke with patient. She asked for a refill on tramadol. She has it on her allergy list. She said she is have issues with her knee. She needs it or if you would like to prescribe something else for her.

## 2024-04-17 ENCOUNTER — HOSPITAL ENCOUNTER (OUTPATIENT)
Dept: RADIOLOGY | Facility: HOSPITAL | Age: 64
Discharge: HOME | End: 2024-04-17
Payer: COMMERCIAL

## 2024-04-17 DIAGNOSIS — B18.2 CHRONIC HEPATITIS C WITHOUT HEPATIC COMA (MULTI): ICD-10-CM

## 2024-04-17 PROCEDURE — 76705 ECHO EXAM OF ABDOMEN: CPT

## 2024-04-17 PROCEDURE — 76705 ECHO EXAM OF ABDOMEN: CPT | Performed by: STUDENT IN AN ORGANIZED HEALTH CARE EDUCATION/TRAINING PROGRAM

## 2024-04-19 ENCOUNTER — TELEPHONE (OUTPATIENT)
Dept: PRIMARY CARE | Facility: CLINIC | Age: 64
End: 2024-04-19
Payer: COMMERCIAL

## 2024-04-19 DIAGNOSIS — R97.20 BPH WITH ELEVATED PSA: Primary | ICD-10-CM

## 2024-04-19 DIAGNOSIS — N40.0 BPH WITH ELEVATED PSA: Primary | ICD-10-CM

## 2024-04-19 NOTE — TELEPHONE ENCOUNTER
----- Message from Jose Juan Mukherjee DO sent at 4/18/2024  6:40 PM EDT -----  Ask patient to please call back ASAP to discuss ultrasound of liver findings and encouraged him to get his blood work as soon as possible.  Referral made to hepatologist and order for MRI of liver  Attempted to call today but line busy

## 2024-04-22 ENCOUNTER — PHARMACY VISIT (OUTPATIENT)
Dept: PHARMACY | Facility: CLINIC | Age: 64
End: 2024-04-22
Payer: COMMERCIAL

## 2024-04-22 ENCOUNTER — LAB (OUTPATIENT)
Dept: LAB | Facility: LAB | Age: 64
End: 2024-04-22
Payer: COMMERCIAL

## 2024-04-22 ENCOUNTER — TELEPHONE (OUTPATIENT)
Dept: PRIMARY CARE | Facility: CLINIC | Age: 64
End: 2024-04-22

## 2024-04-22 DIAGNOSIS — B18.2 CHRONIC HEPATITIS C WITHOUT HEPATIC COMA (MULTI): ICD-10-CM

## 2024-04-22 LAB
AFP SERPL-MCNC: 14 NG/ML (ref 0–9)
ALBUMIN SERPL BCP-MCNC: 4.3 G/DL (ref 3.4–5)
ALP SERPL-CCNC: 68 U/L (ref 33–136)
ALT SERPL W P-5'-P-CCNC: 32 U/L (ref 10–52)
ANION GAP SERPL CALC-SCNC: 14 MMOL/L (ref 10–20)
AST SERPL W P-5'-P-CCNC: 23 U/L (ref 9–39)
BASOPHILS # BLD AUTO: 0.03 X10*3/UL (ref 0–0.1)
BASOPHILS NFR BLD AUTO: 0.3 %
BILIRUB SERPL-MCNC: 1 MG/DL (ref 0–1.2)
BUN SERPL-MCNC: 17 MG/DL (ref 6–23)
CALCIUM SERPL-MCNC: 9.4 MG/DL (ref 8.6–10.3)
CHLORIDE SERPL-SCNC: 103 MMOL/L (ref 98–107)
CO2 SERPL-SCNC: 24 MMOL/L (ref 21–32)
CREAT SERPL-MCNC: 1.04 MG/DL (ref 0.5–1.3)
EGFRCR SERPLBLD CKD-EPI 2021: 80 ML/MIN/1.73M*2
EOSINOPHIL # BLD AUTO: 0.12 X10*3/UL (ref 0–0.7)
EOSINOPHIL NFR BLD AUTO: 1.2 %
ERYTHROCYTE [DISTWIDTH] IN BLOOD BY AUTOMATED COUNT: 12.8 % (ref 11.5–14.5)
GLUCOSE SERPL-MCNC: 96 MG/DL (ref 74–99)
HAV AB SER QL IA: REACTIVE
HBV CORE AB SER QL: NONREACTIVE
HBV SURFACE AB SER-ACNC: <3.1 MIU/ML
HBV SURFACE AG SERPL QL IA: NONREACTIVE
HCT VFR BLD AUTO: 49.6 % (ref 41–52)
HGB BLD-MCNC: 17.4 G/DL (ref 13.5–17.5)
IMM GRANULOCYTES # BLD AUTO: 0.09 X10*3/UL (ref 0–0.7)
IMM GRANULOCYTES NFR BLD AUTO: 0.9 % (ref 0–0.9)
INR PPP: 1 (ref 0.9–1.1)
LYMPHOCYTES # BLD AUTO: 2.68 X10*3/UL (ref 1.2–4.8)
LYMPHOCYTES NFR BLD AUTO: 27.9 %
MCH RBC QN AUTO: 36.9 PG (ref 26–34)
MCHC RBC AUTO-ENTMCNC: 35.1 G/DL (ref 32–36)
MCV RBC AUTO: 105 FL (ref 80–100)
MONOCYTES # BLD AUTO: 0.89 X10*3/UL (ref 0.1–1)
MONOCYTES NFR BLD AUTO: 9.3 %
NEUTROPHILS # BLD AUTO: 5.81 X10*3/UL (ref 1.2–7.7)
NEUTROPHILS NFR BLD AUTO: 60.4 %
NRBC BLD-RTO: 0 /100 WBCS (ref 0–0)
PLATELET # BLD AUTO: 144 X10*3/UL (ref 150–450)
POTASSIUM SERPL-SCNC: 4.5 MMOL/L (ref 3.5–5.3)
PROT SERPL-MCNC: 6.9 G/DL (ref 6.4–8.2)
PROTHROMBIN TIME: 11.1 SECONDS (ref 9.8–12.8)
RBC # BLD AUTO: 4.72 X10*6/UL (ref 4.5–5.9)
SODIUM SERPL-SCNC: 136 MMOL/L (ref 136–145)
WBC # BLD AUTO: 9.6 X10*3/UL (ref 4.4–11.3)

## 2024-04-22 PROCEDURE — 87340 HEPATITIS B SURFACE AG IA: CPT

## 2024-04-22 PROCEDURE — 86706 HEP B SURFACE ANTIBODY: CPT

## 2024-04-22 PROCEDURE — 83883 ASSAY NEPHELOMETRY NOT SPEC: CPT

## 2024-04-22 PROCEDURE — 85610 PROTHROMBIN TIME: CPT

## 2024-04-22 PROCEDURE — 86708 HEPATITIS A ANTIBODY: CPT

## 2024-04-22 PROCEDURE — 84460 ALANINE AMINO (ALT) (SGPT): CPT

## 2024-04-22 PROCEDURE — 86704 HEP B CORE ANTIBODY TOTAL: CPT

## 2024-04-22 PROCEDURE — 36415 COLL VENOUS BLD VENIPUNCTURE: CPT

## 2024-04-22 PROCEDURE — 80053 COMPREHEN METABOLIC PANEL: CPT

## 2024-04-22 PROCEDURE — 85025 COMPLETE CBC W/AUTO DIFF WBC: CPT

## 2024-04-22 PROCEDURE — 82105 ALPHA-FETOPROTEIN SERUM: CPT

## 2024-04-22 NOTE — TELEPHONE ENCOUNTER
They are getting a lot of calls to schedule testing, but they can't make heads or tails out of it. They have a lot of questions  Requesting a call back

## 2024-04-25 LAB
A2 MACROGLOB SERPL-MCNC: 370 MG/DL (ref 110–276)
ALT SERPL W P-5'-P-CCNC: 41 IU/L (ref 0–55)
APO A-I SERPL-MCNC: 114 MG/DL (ref 101–178)
BILIRUB SERPL-MCNC: 0.7 MG/DL (ref 0–1.2)
FIBROSIS SCORING:: ABNORMAL
FIBROSIS STAGE SERPL QL: ABNORMAL
GGT SERPL-CCNC: 107 IU/L (ref 0–65)
HAPTOGLOB SERPL-MCNC: 177 MG/DL (ref 32–363)
INTERPRETATIONS:(HCVFS): ABNORMAL
LABORATORY COMMENT REPORT: ABNORMAL
LIVER FIBR SCORE SERPL CALC.FIBROSURE: 0.8 (ref 0–0.21)
NECROINFLAMM ACTIVITY SCORING:(HCVFS): ABNORMAL
NECROINFLAMMATORY ACT GRADE SERPL QL: ABNORMAL
NECROINFLAMMATORY ACT SCORE SERPL: 0.38 (ref 0–0.17)
SERVICE CMNT-IMP: ABNORMAL
TEST PERFORMANCE INFO SPEC: ABNORMAL

## 2024-04-28 PROCEDURE — RXMED WILLOW AMBULATORY MEDICATION CHARGE

## 2024-05-02 ENCOUNTER — HOSPITAL ENCOUNTER (OUTPATIENT)
Dept: RADIOLOGY | Facility: CLINIC | Age: 64
Discharge: HOME | End: 2024-05-02
Payer: COMMERCIAL

## 2024-05-02 ENCOUNTER — PHARMACY VISIT (OUTPATIENT)
Dept: PHARMACY | Facility: CLINIC | Age: 64
End: 2024-05-02
Payer: COMMERCIAL

## 2024-05-02 DIAGNOSIS — N40.0 BPH WITH ELEVATED PSA: ICD-10-CM

## 2024-05-02 DIAGNOSIS — R97.20 BPH WITH ELEVATED PSA: ICD-10-CM

## 2024-05-02 PROCEDURE — A9575 INJ GADOTERATE MEGLUMI 0.1ML: HCPCS | Performed by: INTERNAL MEDICINE

## 2024-05-02 PROCEDURE — 76498 UNLISTED MR PROCEDURE: CPT | Performed by: RADIOLOGY

## 2024-05-02 PROCEDURE — 72197 MRI PELVIS W/O & W/DYE: CPT | Performed by: RADIOLOGY

## 2024-05-02 PROCEDURE — 2550000001 HC RX 255 CONTRASTS: Performed by: INTERNAL MEDICINE

## 2024-05-02 PROCEDURE — 72197 MRI PELVIS W/O & W/DYE: CPT

## 2024-05-02 RX ORDER — GADOTERATE MEGLUMINE 376.9 MG/ML
0.2 INJECTION INTRAVENOUS
Status: COMPLETED | OUTPATIENT
Start: 2024-05-02 | End: 2024-05-02

## 2024-05-02 RX ADMIN — GADOTERATE MEGLUMINE 20 ML: 376.9 INJECTION INTRAVENOUS at 08:28

## 2024-05-03 ENCOUNTER — SPECIALTY PHARMACY (OUTPATIENT)
Dept: PHARMACY | Facility: CLINIC | Age: 64
End: 2024-05-03

## 2024-05-03 NOTE — PROGRESS NOTES
The patient was called today to attempt to review new start instructions for Mavyret. Spoke on speaker phone to his wife, Nia, as well. They sounded annoyed and stated that they were not interested in reviewing. Advised that they either follow up with Dr. Mukherjee, who prescribed the med, or with Dr. Park, who he is scheduled to see next week. Discussed duration of therapy and the need for labwork; however no orders were placed at this time.

## 2024-05-09 ENCOUNTER — OFFICE VISIT (OUTPATIENT)
Dept: GASTROENTEROLOGY | Facility: CLINIC | Age: 64
End: 2024-05-09
Payer: COMMERCIAL

## 2024-05-09 VITALS
HEART RATE: 85 BPM | DIASTOLIC BLOOD PRESSURE: 84 MMHG | BODY MASS INDEX: 35.1 KG/M2 | WEIGHT: 237 LBS | HEIGHT: 69 IN | OXYGEN SATURATION: 98 % | SYSTOLIC BLOOD PRESSURE: 152 MMHG

## 2024-05-09 DIAGNOSIS — B18.2 HEPATIC CIRRHOSIS DUE TO CHRONIC HEPATITIS C INFECTION (MULTI): ICD-10-CM

## 2024-05-09 DIAGNOSIS — R16.0 LIVER MASS: ICD-10-CM

## 2024-05-09 DIAGNOSIS — K74.60 HEPATIC CIRRHOSIS DUE TO CHRONIC HEPATITIS C INFECTION (MULTI): ICD-10-CM

## 2024-05-09 PROCEDURE — 3060F POS MICROALBUMINURIA REV: CPT | Performed by: INTERNAL MEDICINE

## 2024-05-09 PROCEDURE — 99205 OFFICE O/P NEW HI 60 MIN: CPT | Performed by: INTERNAL MEDICINE

## 2024-05-09 PROCEDURE — 1160F RVW MEDS BY RX/DR IN RCRD: CPT | Performed by: INTERNAL MEDICINE

## 2024-05-09 PROCEDURE — 1159F MED LIST DOCD IN RCRD: CPT | Performed by: INTERNAL MEDICINE

## 2024-05-09 PROCEDURE — 3048F LDL-C <100 MG/DL: CPT | Performed by: INTERNAL MEDICINE

## 2024-05-09 PROCEDURE — 4010F ACE/ARB THERAPY RXD/TAKEN: CPT | Performed by: INTERNAL MEDICINE

## 2024-05-09 PROCEDURE — 3077F SYST BP >= 140 MM HG: CPT | Performed by: INTERNAL MEDICINE

## 2024-05-09 PROCEDURE — 1126F AMNT PAIN NOTED NONE PRSNT: CPT | Performed by: INTERNAL MEDICINE

## 2024-05-09 PROCEDURE — 3079F DIAST BP 80-89 MM HG: CPT | Performed by: INTERNAL MEDICINE

## 2024-05-09 PROCEDURE — 3044F HG A1C LEVEL LT 7.0%: CPT | Performed by: INTERNAL MEDICINE

## 2024-05-09 ASSESSMENT — PAIN SCALES - GENERAL: PAINLEVEL: 0-NO PAIN

## 2024-05-09 NOTE — PATIENT INSTRUCTIONS
Get lab tests today.    Stop all alcohol use.    Check with your surgeon about getting colonoscopy and upper endoscopy.    Call us back at [phone] to let us know when you have completed your MRI of the liver so that we can look at the results.    Continue your hepatitis B vaccines with your primary care.    Work on diet, weight loss, exercise, and smoking cessation.

## 2024-05-09 NOTE — PROGRESS NOTES
HEPATOLOGY NEW PATIENT VISIT    May 9, 2024    Dr. Jose Juan Mukherjee       Patient Name:   HEMANT WOOD  Medical Record Number: [id number]  YOB: 1960    Dear Dr. Mukherjee,    I had the pleasure of seeing Hemant Wood (along with his wife) for consultation in the [facility] ([facility] office). History and physical examination was performed. Pertinent available laboratory, imaging, pathology results were reviewed.  I spent 25 minutes reviewing his records in preparation for this visit.    History of Present Illness:   The patient is a 63 year old white male who is referred for evaluation of hepatitis C and cirrhosis and a possible liver mass.    The patient was undergoing routine testing and was noted to be hepatitis C positive.  He underwent a hepatitis C FibroSURE as well as an ultrasound which both were consistent with cirrhosis.  For these reasons, he was referred to me for further evaluation.    Prior to this, he had never been made aware of any liver issues or hepatitis.  Ironically, he was notified of his hepatitis C by the health department.  He reports that no one specifically called him from the medical office and it was not until the health department called him that he was made aware of it.    The patient denied any past history of acute hepatitis or jaundice.      He has never had any manifestations of liver disease including no jaundice, ascites, hepatic encephalopathy, or variceal bleeding. He denied ever having a liver biopsy.    His PCP submitted the order to get him started on Mavyret.  Yet, the patient was still referred to me for evaluation.  In fact, the patient started his Mavyret about 2 to 3 weeks ago.  He was actually called by the specialty pharmacist to discuss the medication but she found out that he was already on the medication.  The patient has had no particular side effects and has been compliant with the medication.    He presented today  for evaluation.  He denied any specific liver-related complaints.  He is extremely concerned about his liver issues because he reports that he was not even aware that he had a mass in the liver until he read it himself on the  [facility] stefano.    Past Medical/Surgical History:   Type 2 diabetes mellitus with hyperglycemia, without long-term current use of insulin (Multi)  Actinic keratoses  Actinic skin damage  BPH with elevated PSA  CAD (coronary artery disease)  Current smoker  Dyslipidemia associated with type 2 diabetes mellitus (Multi)  Hypertensive heart disease with chronic diastolic congestive heart failure (Multi)  Sleep-disordered breathing  Uncomplicated alcohol dependence (Multi)  Diabetic polyneuropathy associated with type 2 diabetes mellitus (Multi)  Diabetes mellitus type 2 with peripheral artery disease (Multi)  Chronic hepatitis C without hepatic coma (Multi)  Cirrhosis    Family History:   There is no known family history of liver disease.  There is no known family history of colon cancer.    Social History:   He is a smoker.  He does drink alcohol, about 3 shots a day.  He does have tattoos.  He has a remote history of intravenous drug use.  He has been incarcerated in the past.  He denied blood transfusions.  His wife tested negative for hepatitis C.  They do not have any children.    Review of systems: As noted above.  He does get some issues with intermittent constipation.  No fever, chills, weight loss, visual changes, auditory changes, shortness of breath, chest pain, abdominal pain, GI bleeding, diarrhea, depression, dysuria, hematuria, musculoskeletal issues, or rash.       Medical, Surgical, Family, and Social Histories  Past Medical History:   Diagnosis Date    Hyperlipidemia, unspecified 12/17/2021    Dyslipidemia    Personal history of other endocrine, nutritional and metabolic disease 12/17/2021    History of diabetes mellitus    Personal history of other endocrine, nutritional and  metabolic disease 12/17/2021    History of type 2 diabetes mellitus    Personal history of other endocrine, nutritional and metabolic disease 04/08/2022    History of type 2 diabetes mellitus    Personal history of other specified conditions 09/10/2021    History of chest pain    STEMI (ST elevation myocardial infarction) (Multi) 09/07/2023       Past Surgical History:   Procedure Laterality Date    OTHER SURGICAL HISTORY  07/23/2021    Cardiac catheterization with stent placement    OTHER SURGICAL HISTORY  07/29/2021    Cyst excision    OTHER SURGICAL HISTORY  07/29/2021    Cholecystectomy    OTHER SURGICAL HISTORY  07/29/2021    Shoulder surgery       Family History   Family history unknown: Yes       Social History     Socioeconomic History    Marital status:      Spouse name: Not on file    Number of children: Not on file    Years of education: Not on file    Highest education level: Not on file   Occupational History    Not on file   Tobacco Use    Smoking status: Every Day     Current packs/day: 1.00     Types: Cigarettes    Smokeless tobacco: Never   Vaping Use    Vaping status: Never Used   Substance and Sexual Activity    Alcohol use: Yes    Drug use: Never    Sexual activity: Yes   Other Topics Concern    Not on file   Social History Narrative    Not on file     Social Determinants of Health     Financial Resource Strain: Not on file   Food Insecurity: Not on file   Transportation Needs: Not on file   Physical Activity: Not on file   Stress: Not on file   Social Connections: Not on file   Intimate Partner Violence: Not on file   Housing Stability: Not on file       Allergies and Current Medications  Allergies   Allergen Reactions    Metformin Diarrhea     Current Outpatient Medications   Medication Sig Dispense Refill    aspirin 81 mg EC tablet Take 1 tablet (81 mg) by mouth once daily.      atorvastatin (Lipitor) 80 mg tablet Take 1 tablet (80 mg) by mouth once daily.      dulaglutide (TRULICITY)  "4.5 mg/0.5 mL pen injector INJECT ONE PEN (4.5 MG) UNDER THE SKIN ONCE WEEKLY 2 mL 11    empagliflozin (Jardiance) 25 mg TAKE 1 TABLET (25 MG) BY MOUTH ONCE DAILY. 90 tablet 3    glecaprevir-pibrentasvir (Mavyret) 100-40 mg tablet tablet Take 3 tablets by mouth once daily. 90 tablet 0    losartan (Cozaar) 100 mg tablet TAKE 1 TABLET BY MOUTH ONCE DAILY 90 tablet 0    metoprolol succinate XL (Toprol XL) 100 mg 24 hr tablet Take 1 tablet (100 mg) by mouth once daily. Do not crush or chew. 90 tablet 3    nitroglycerin (Nitrostat) 0.4 mg SL tablet Place under the tongue.      OneTouch Delica Plus Lancet 30 gauge misc       OneTouch Ultra Test strip       OneTouch Ultra2 Meter misc        No current facility-administered medications for this visit.        Physical Exam  /84   Pulse 85   Ht 1.753 m (5' 9\")   Wt 108 kg (237 lb)   SpO2 98%   BMI 35.00 kg/m²       Physical Examination:   General Appearance: alert and in no acute distress.   HEENT: oropharynx without lesions. Anicteric sclerae.   Neck supple, nontender, without adenopathy, thyromegaly, or JVD.   Lungs clear to auscultation and percussion.   Heart RRR without murmurs, rubs, or gallops.   Abdomen: Soft, nontender, bowel sounds positive, without obvious ascites. Liver and spleen not palpable.   Extremities full ROM, no atrophy, normal strength. No edema.   Skin does reveal several seborrheic keratoses.  He does have tattoos.  He is quite hyperpigmented.  Neurological exam nonfocal, alert and oriented.  No asterixis.  No spider angiomata, or palmar erythema.     Labs 4/22/2024 AFP 14, hepatitis A antibody positive, hepatitis B core antibody negative, hepatitis B surface antigen negative, hepatitis B surface antibody negative, WBC 9.6, hemoglobin 17.4, , platelets 144, INR 1, glucose 96, sodium 136, creatinine 1.04, protein 6.9, albumin 4.3, alk phos 68, bilirubin 1, AST 23, ALT 32, HCV FibroSURE showed stage 4 fibrosis and grade 1-2 " activity.    Labs 3/4/2024 hepatitis C RNA 17,000,000 IU/ml, hepatitis C antibody positive, cholesterol 142, triglycerides 112, hemoglobin A1c 6.4%, AST 29, ALT 35.    Labs 7/22/2021 hemoglobin A1c 10.8%.    Ultrasound 4/17/2024:  IMPRESSION:  Cirrhosis with nonspecific 5 cm right hepatic mass. Consider further  characterization with liver MRI.    Colonoscopy 11/21/2022:  Impression:            - 16 medium polyps in the cecum, removed with a hot                          snare. Resected and retrieved.                         - One medium polyp in the sigmoid colon, removed with                          a hot snare. Resected and retrieved.                         - The examination was otherwise normal.    Pathology from colonoscopy 11/21/2022:  FINAL DIAGNOSIS   A.  POLYPS X16 (CECUM), EXCISIONAL BIOPSIES:        - ADENOMATOUS POLYPS (TUBULAR ADENOMAS).     B.  POLYP (SIGMOID COLON), EXCISIONAL BIOPSY:         - ADENOMATOUS POLYP (TUBULAR ADENOMA).         Assessment/Plan:     Hepatitis C, on Mavyret therapy  Cirrhosis  Liver mass    The patient is referred to me for evaluation of the above issues.    I discussed with this patient about hepatitis C. We talked about household spread. We talked about sexual spread. We talked about ways to avoid spreading the disease. I explained that sexual partners should be tested, and if negative, they should make a decision about the use of condoms.  We also talked about avoiding sharing razor blades or toothbrushes with anyone. I also advised that alcohol use should be avoided.    He was started on Mavyret by his PCP.  Given the fact that he does appear to have some degree of decompensated cirrhosis with thrombocytopenia and a liver mass, I personally would not have chosen this medication.  I will check labs today to make sure that he is not having any hepatic deterioration on this medication.  If there are worsening of the labs, I would stop this and then likely switch him to  Epclusa.    Based on the ultrasound as well as the HCV FibroSURE and his low platelets, we can safely say that he has cirrhosis.  We do not need to consider a liver biopsy or FibroScan.  We will just treat him as a cirrhotic patient.      He is immune to hepatitis A.    He is not immune to hepatitis B.  His PCP has started hepatitis B vaccines on him.    With regard to the liver lesion, his PCP has already ordered an MRI of the liver.  His AFP was not overly revealing.  I did ask the patient to call us the day after the MRI is done so that we can look at the results and then make further recommendations.    With his cirrhosis and thrombocytopenia, he does need EGD to screen for varices.    Given the fact that he reportedly had 16 polyps in the cecum as well as a polyp in the sigmoid colon, all of which were tubular adenomas, I would recommend a simultaneous colonoscopy to be repeated.    He needs to avoid all alcohol use given his cirrhosis.    Thank you for allowing me to participate in the care of this patient. Please feel free to contact me with any questions regarding their care.     Sincerely,     Tien Park MD, FAASLD, FACG.   Medical Director, Hepatology  Senior Attending Physician  [facility]  [facility]  Professor of Medicine  Division of Gastroenterology and Liver Disease  [facility] [employer]  [address] [place] [id number]  Phone: [phone]  Fax: [phone].      This document was generated with a computerized dictation system.  Because of this, there could be errors in grammar and/or content.

## 2024-05-10 ENCOUNTER — LAB (OUTPATIENT)
Dept: LAB | Facility: LAB | Age: 64
End: 2024-05-10
Payer: COMMERCIAL

## 2024-05-10 DIAGNOSIS — K74.60 HEPATIC CIRRHOSIS DUE TO CHRONIC HEPATITIS C INFECTION (MULTI): ICD-10-CM

## 2024-05-10 DIAGNOSIS — R16.0 LIVER MASS: ICD-10-CM

## 2024-05-10 DIAGNOSIS — B18.2 HEPATIC CIRRHOSIS DUE TO CHRONIC HEPATITIS C INFECTION (MULTI): ICD-10-CM

## 2024-05-10 LAB
ALBUMIN SERPL BCP-MCNC: 4.5 G/DL (ref 3.4–5)
ALP SERPL-CCNC: 82 U/L (ref 33–136)
ALT SERPL W P-5'-P-CCNC: 21 U/L (ref 10–52)
ANION GAP SERPL CALC-SCNC: 14 MMOL/L (ref 10–20)
AST SERPL W P-5'-P-CCNC: 21 U/L (ref 9–39)
BASOPHILS # BLD AUTO: 0.04 X10*3/UL (ref 0–0.1)
BASOPHILS NFR BLD AUTO: 0.4 %
BILIRUB DIRECT SERPL-MCNC: 0.3 MG/DL (ref 0–0.3)
BILIRUB SERPL-MCNC: 1.1 MG/DL (ref 0–1.2)
BUN SERPL-MCNC: 19 MG/DL (ref 6–23)
CALCIUM SERPL-MCNC: 10.1 MG/DL (ref 8.6–10.3)
CHLORIDE SERPL-SCNC: 102 MMOL/L (ref 98–107)
CO2 SERPL-SCNC: 25 MMOL/L (ref 21–32)
CREAT SERPL-MCNC: 1.14 MG/DL (ref 0.5–1.3)
EGFRCR SERPLBLD CKD-EPI 2021: 71 ML/MIN/1.73M*2
EOSINOPHIL # BLD AUTO: 0.16 X10*3/UL (ref 0–0.7)
EOSINOPHIL NFR BLD AUTO: 1.7 %
ERYTHROCYTE [DISTWIDTH] IN BLOOD BY AUTOMATED COUNT: 12.7 % (ref 11.5–14.5)
GLUCOSE SERPL-MCNC: 106 MG/DL (ref 74–99)
HCT VFR BLD AUTO: 49.2 % (ref 41–52)
HGB BLD-MCNC: 17.3 G/DL (ref 13.5–17.5)
IMM GRANULOCYTES # BLD AUTO: 0.1 X10*3/UL (ref 0–0.7)
IMM GRANULOCYTES NFR BLD AUTO: 1 % (ref 0–0.9)
INR PPP: 1 (ref 0.9–1.1)
LYMPHOCYTES # BLD AUTO: 2.8 X10*3/UL (ref 1.2–4.8)
LYMPHOCYTES NFR BLD AUTO: 28.9 %
MCH RBC QN AUTO: 36.3 PG (ref 26–34)
MCHC RBC AUTO-ENTMCNC: 35.2 G/DL (ref 32–36)
MCV RBC AUTO: 103 FL (ref 80–100)
MONOCYTES # BLD AUTO: 0.95 X10*3/UL (ref 0.1–1)
MONOCYTES NFR BLD AUTO: 9.8 %
NEUTROPHILS # BLD AUTO: 5.64 X10*3/UL (ref 1.2–7.7)
NEUTROPHILS NFR BLD AUTO: 58.2 %
NRBC BLD-RTO: 0 /100 WBCS (ref 0–0)
PLATELET # BLD AUTO: 184 X10*3/UL (ref 150–450)
POTASSIUM SERPL-SCNC: 4.9 MMOL/L (ref 3.5–5.3)
PROT SERPL-MCNC: 7.2 G/DL (ref 6.4–8.2)
PROTHROMBIN TIME: 11.6 SECONDS (ref 9.8–12.8)
RBC # BLD AUTO: 4.76 X10*6/UL (ref 4.5–5.9)
SODIUM SERPL-SCNC: 136 MMOL/L (ref 136–145)
WBC # BLD AUTO: 9.7 X10*3/UL (ref 4.4–11.3)

## 2024-05-10 PROCEDURE — 36415 COLL VENOUS BLD VENIPUNCTURE: CPT

## 2024-05-10 PROCEDURE — 85610 PROTHROMBIN TIME: CPT

## 2024-05-10 PROCEDURE — 80053 COMPREHEN METABOLIC PANEL: CPT

## 2024-05-10 PROCEDURE — 82248 BILIRUBIN DIRECT: CPT

## 2024-05-10 PROCEDURE — 85025 COMPLETE CBC W/AUTO DIFF WBC: CPT

## 2024-05-13 DIAGNOSIS — Z23 NEED FOR HEPATITIS A AND B VACCINATION: ICD-10-CM

## 2024-05-14 ENCOUNTER — OFFICE VISIT (OUTPATIENT)
Dept: PRIMARY CARE | Facility: CLINIC | Age: 64
End: 2024-05-14
Payer: COMMERCIAL

## 2024-05-14 VITALS
BODY MASS INDEX: 35.25 KG/M2 | DIASTOLIC BLOOD PRESSURE: 78 MMHG | WEIGHT: 238 LBS | HEIGHT: 69 IN | SYSTOLIC BLOOD PRESSURE: 122 MMHG | HEART RATE: 74 BPM

## 2024-05-14 DIAGNOSIS — R97.20 BPH WITH ELEVATED PSA: ICD-10-CM

## 2024-05-14 DIAGNOSIS — F10.20 UNCOMPLICATED ALCOHOL DEPENDENCE (MULTI): ICD-10-CM

## 2024-05-14 DIAGNOSIS — F41.8 SITUATIONAL ANXIETY: Primary | ICD-10-CM

## 2024-05-14 DIAGNOSIS — K74.60 CIRRHOSIS OF LIVER WITHOUT ASCITES, UNSPECIFIED HEPATIC CIRRHOSIS TYPE (MULTI): ICD-10-CM

## 2024-05-14 DIAGNOSIS — Z23 NEED FOR HEPATITIS A AND B VACCINATION: ICD-10-CM

## 2024-05-14 DIAGNOSIS — N40.0 BPH WITH ELEVATED PSA: ICD-10-CM

## 2024-05-14 DIAGNOSIS — I50.32 HYPERTENSIVE HEART DISEASE WITH CHRONIC DIASTOLIC CONGESTIVE HEART FAILURE (MULTI): ICD-10-CM

## 2024-05-14 DIAGNOSIS — I11.0 HYPERTENSIVE HEART DISEASE WITH CHRONIC DIASTOLIC CONGESTIVE HEART FAILURE (MULTI): ICD-10-CM

## 2024-05-14 DIAGNOSIS — F17.200 CURRENT SMOKER: ICD-10-CM

## 2024-05-14 DIAGNOSIS — E11.51 DIABETES MELLITUS TYPE 2 WITH PERIPHERAL ARTERY DISEASE (MULTI): ICD-10-CM

## 2024-05-14 DIAGNOSIS — E11.69 DYSLIPIDEMIA ASSOCIATED WITH TYPE 2 DIABETES MELLITUS (MULTI): ICD-10-CM

## 2024-05-14 DIAGNOSIS — E78.5 DYSLIPIDEMIA ASSOCIATED WITH TYPE 2 DIABETES MELLITUS (MULTI): ICD-10-CM

## 2024-05-14 DIAGNOSIS — E11.65 TYPE 2 DIABETES MELLITUS WITH HYPERGLYCEMIA, WITHOUT LONG-TERM CURRENT USE OF INSULIN (MULTI): ICD-10-CM

## 2024-05-14 PROBLEM — D12.2 ADENOMATOUS POLYP OF ASCENDING COLON: Status: ACTIVE | Noted: 2024-05-14

## 2024-05-14 PROBLEM — E11.42 DIABETIC POLYNEUROPATHY ASSOCIATED WITH TYPE 2 DIABETES MELLITUS (MULTI): Status: RESOLVED | Noted: 2023-09-15 | Resolved: 2024-05-14

## 2024-05-14 PROBLEM — L57.8 ACTINIC SKIN DAMAGE: Status: RESOLVED | Noted: 2023-09-07 | Resolved: 2024-05-14

## 2024-05-14 PROCEDURE — 3060F POS MICROALBUMINURIA REV: CPT | Performed by: INTERNAL MEDICINE

## 2024-05-14 PROCEDURE — 1160F RVW MEDS BY RX/DR IN RCRD: CPT | Performed by: INTERNAL MEDICINE

## 2024-05-14 PROCEDURE — 1159F MED LIST DOCD IN RCRD: CPT | Performed by: INTERNAL MEDICINE

## 2024-05-14 PROCEDURE — RXMED WILLOW AMBULATORY MEDICATION CHARGE

## 2024-05-14 PROCEDURE — 99214 OFFICE O/P EST MOD 30 MIN: CPT | Performed by: INTERNAL MEDICINE

## 2024-05-14 PROCEDURE — 90471 IMMUNIZATION ADMIN: CPT | Performed by: INTERNAL MEDICINE

## 2024-05-14 PROCEDURE — 3078F DIAST BP <80 MM HG: CPT | Performed by: INTERNAL MEDICINE

## 2024-05-14 PROCEDURE — 4010F ACE/ARB THERAPY RXD/TAKEN: CPT | Performed by: INTERNAL MEDICINE

## 2024-05-14 PROCEDURE — 3048F LDL-C <100 MG/DL: CPT | Performed by: INTERNAL MEDICINE

## 2024-05-14 PROCEDURE — 3044F HG A1C LEVEL LT 7.0%: CPT | Performed by: INTERNAL MEDICINE

## 2024-05-14 PROCEDURE — 90636 HEP A/HEP B VACC ADULT IM: CPT | Performed by: INTERNAL MEDICINE

## 2024-05-14 PROCEDURE — 3074F SYST BP LT 130 MM HG: CPT | Performed by: INTERNAL MEDICINE

## 2024-05-14 RX ORDER — LORAZEPAM 0.5 MG/1
.5-1 TABLET ORAL SEE ADMIN INSTRUCTIONS
Qty: 2 TABLET | Refills: 0 | Status: SHIPPED | OUTPATIENT
Start: 2024-05-14 | End: 2024-06-13

## 2024-05-14 NOTE — ASSESSMENT & PLAN NOTE
Will give 0.5 mg of lorazepam 30 minutes prior to MRI due to claustrophobia and situational anxiety

## 2024-05-14 NOTE — PROGRESS NOTES
"Subjective   Patient ID: Hemant Ness is a 63 y.o. male who presents for Follow-up.    HPI     Review of Systems    Objective   /78 (BP Location: Right arm, Patient Position: Sitting)   Pulse 74   Ht 1.753 m (5' 9\")   Wt 108 kg (238 lb)   BMI 35.15 kg/m²     Physical Exam    Assessment/Plan          "

## 2024-05-14 NOTE — ASSESSMENT & PLAN NOTE
Patient with multiple colon polyps removed November 21, 2022 is scheduled for colonoscopy surveillance and reevaluation given the number and size of polyps

## 2024-05-14 NOTE — ASSESSMENT & PLAN NOTE
Continue baby aspirin therapy no signs or symptoms of claudication repeat hemoglobin A1c on empagliflozin 25 mg daily with dulaglutide 4.5 mg subcu weekly tolerating well

## 2024-05-14 NOTE — PROGRESS NOTES
"Subjective   Reason for Visit: Hemant Ness is an 63 y.o. male here for a fu visit.          Reviewed all medications by prescribing practitioner or clinical pharmacist (such as prescriptions, OTCs, herbal therapies and supplements) and documented in the medical record.    Patient has been feeling well has been anxious about test results over the past month with recent diagnosis of chronic hepatitis C evaluation of liver nodule versus mass in the setting of cirrhosis and enlarged prostate with elevated PSA.  Patient overall has been feeling well with medical therapy given and repeat lab work revealed normalized LFTs normal CBC and normal INR indicating no functional liver impairment.  No signs or symptoms of decompensated cirrhosis MRI of prostate revealed no high risk lesions to be seen by urologist for follow-up for elevated PSA MRI prostate does not suggest high risk prostate cancer but not cannot rule out lower risk prostate cancer patient working on smoking cessation and alcohol cessation otherwise feels well given second booster of hepatitis A and B tolerated well.        Patient Care Team:  Jose Juan Mukherjee DO as PCP - General  Jose Juan Mukherjee DO as PCP - Hailee O PCP     Review of Systems   All other systems reviewed and are negative.      Objective   Vitals:  /78 (BP Location: Right arm, Patient Position: Sitting)   Pulse 74   Ht 1.753 m (5' 9\")   Wt 108 kg (238 lb)   BMI 35.15 kg/m²       Physical Exam  Vitals and nursing note reviewed.   Constitutional:       General: He is not in acute distress.     Appearance: Normal appearance. He is well-developed. He is obese. He is not toxic-appearing.   HENT:      Head: Normocephalic and atraumatic.      Right Ear: Tympanic membrane and external ear normal.      Left Ear: Tympanic membrane and external ear normal.      Nose: Nose normal.      Mouth/Throat:      Mouth: Mucous membranes are moist.      Pharynx: Oropharynx is clear. No " oropharyngeal exudate or posterior oropharyngeal erythema.      Tonsils: No tonsillar exudate. 2+ on the right. 2+ on the left.   Eyes:      Extraocular Movements: Extraocular movements intact.      Conjunctiva/sclera: Conjunctivae normal.   Cardiovascular:      Rate and Rhythm: Normal rate and regular rhythm.      Pulses: Normal pulses.      Heart sounds: Normal heart sounds. No murmur heard.  Pulmonary:      Effort: Pulmonary effort is normal.      Breath sounds: Normal breath sounds.   Abdominal:      General: Abdomen is flat. Bowel sounds are normal.      Palpations: Abdomen is soft.   Musculoskeletal:      Cervical back: Neck supple.   Feet:      Right foot:      Skin integrity: Skin integrity normal. No ulcer, blister, skin breakdown, erythema, warmth or callus.      Toenail Condition: Right toenails are normal.      Left foot:      Skin integrity: Skin integrity normal. No ulcer, blister, skin breakdown, erythema, warmth or callus.      Toenail Condition: Left toenails are normal.   Lymphadenopathy:      Cervical: No cervical adenopathy.   Skin:     General: Skin is warm and dry.      Capillary Refill: Capillary refill takes more than 3 seconds.      Findings: No rash.   Neurological:      Mental Status: He is alert. Mental status is at baseline.      Sensory: Sensation is intact.   Psychiatric:         Mood and Affect: Mood normal.         Behavior: Behavior normal.         Thought Content: Thought content normal.         Judgment: Judgment normal.       Assessment/Plan   Problem List Items Addressed This Visit    None  Visit Diagnoses       Need for hepatitis A and B vaccination

## 2024-05-14 NOTE — ASSESSMENT & PLAN NOTE
Continue to work at smoking cessation will discuss further at next visit   Complex Requirements: Involvement Of Free Margin?: [place]

## 2024-05-14 NOTE — ASSESSMENT & PLAN NOTE
Cirrhotic liver will undergo evaluation for portal hypertension with EGD to evaluate esophageal varices no history of variceal bleeding continues on metoprolol for cardiovascular benefit and will assist in reduction and portal hypertension.  Patient with liver nodule versus mass to undergo MRI of liver for further characterization and follow-up AFP levels were not significantly elevated does not give impression of alcoholic hepatitis or marked cirrhosis at this time will evaluate further for hepatoma follow-up on results

## 2024-05-14 NOTE — ASSESSMENT & PLAN NOTE
Has follow-up appointment scheduled with urologist MRI prostate appeared low risk we will discuss biopsy in future may reevaluate PSA for stability consider watchful waiting until hepatitis treatment is completed

## 2024-05-14 NOTE — ASSESSMENT & PLAN NOTE
Active treatment 3rd week of therapy on Mavyret 100/40 3 tablets daily tolerating well lab work stable updated on [facility] following also with hepatologist

## 2024-05-16 ENCOUNTER — PHARMACY VISIT (OUTPATIENT)
Dept: PHARMACY | Facility: CLINIC | Age: 64
End: 2024-05-16
Payer: COMMERCIAL

## 2024-05-20 ENCOUNTER — TELEPHONE (OUTPATIENT)
Dept: GASTROENTEROLOGY | Facility: CLINIC | Age: 64
End: 2024-05-20
Payer: COMMERCIAL

## 2024-05-21 ENCOUNTER — SPECIALTY PHARMACY (OUTPATIENT)
Dept: PHARMACY | Facility: CLINIC | Age: 64
End: 2024-05-21

## 2024-05-21 NOTE — PROGRESS NOTES
Received notification that the patient had a question regarding the duration of treatment. Spoke with patient's wife, Nia, who stated that he was treatment naive. Advised that the Mavyret medication would be 8 weeks in duration. Discussed that the med was prescribed by their PCP and that Dr. Park may want to change it. We would reach out to them if that happens. As of now, the lab results appear stable.

## 2024-05-24 ENCOUNTER — HOSPITAL ENCOUNTER (OUTPATIENT)
Dept: RADIOLOGY | Facility: CLINIC | Age: 64
Discharge: HOME | End: 2024-05-24
Payer: COMMERCIAL

## 2024-05-24 DIAGNOSIS — K74.60 HEPATIC CIRRHOSIS DUE TO CHRONIC HEPATITIS C INFECTION (MULTI): ICD-10-CM

## 2024-05-24 DIAGNOSIS — B18.2 HEPATIC CIRRHOSIS DUE TO CHRONIC HEPATITIS C INFECTION (MULTI): ICD-10-CM

## 2024-05-24 DIAGNOSIS — R16.0 LIVER MASS: ICD-10-CM

## 2024-05-24 PROCEDURE — 2550000001 HC RX 255 CONTRASTS: Performed by: INTERNAL MEDICINE

## 2024-05-24 PROCEDURE — 74183 MRI ABD W/O CNTR FLWD CNTR: CPT | Performed by: RADIOLOGY

## 2024-05-24 PROCEDURE — A9575 INJ GADOTERATE MEGLUMI 0.1ML: HCPCS | Performed by: INTERNAL MEDICINE

## 2024-05-24 PROCEDURE — 74183 MRI ABD W/O CNTR FLWD CNTR: CPT

## 2024-05-24 RX ORDER — GADOTERATE MEGLUMINE 376.9 MG/ML
0.2 INJECTION INTRAVENOUS
Status: COMPLETED | OUTPATIENT
Start: 2024-05-24 | End: 2024-05-24

## 2024-05-24 RX ADMIN — GADOTERATE MEGLUMINE 20 ML: 376.9 INJECTION INTRAVENOUS at 09:22

## 2024-05-30 ENCOUNTER — TELEPHONE (OUTPATIENT)
Dept: GASTROENTEROLOGY | Facility: CLINIC | Age: 64
End: 2024-05-30
Payer: COMMERCIAL

## 2024-05-30 DIAGNOSIS — R16.0 LIVER MASS: ICD-10-CM

## 2024-06-02 DIAGNOSIS — I10 HYPERTENSION, BENIGN: ICD-10-CM

## 2024-06-03 ENCOUNTER — SPECIALTY PHARMACY (OUTPATIENT)
Dept: INTERNAL MEDICINE | Facility: HOSPITAL | Age: 64
End: 2024-06-03
Payer: COMMERCIAL

## 2024-06-03 DIAGNOSIS — B18.2 CHRONIC HEPATITIS C WITHOUT HEPATIC COMA (MULTI): Primary | ICD-10-CM

## 2024-06-03 RX ORDER — LOSARTAN POTASSIUM 100 MG/1
100 TABLET ORAL DAILY
Qty: 90 TABLET | Refills: 0 | Status: SHIPPED | OUTPATIENT
Start: 2024-06-03

## 2024-06-03 NOTE — PROGRESS NOTES
The patient was called today to follow up mid-HCV treatment. Spoke with his wife. She stated that he has been taking his medication daily with no issues. She denies having started any new medications. He was advised to complete the HCV RNA labwork. She verbalized understanding.    Advised that there was an interaction with atorvastatin, and it is not recommended to co-administer with Mavyret. She stated that they were not aware. Advised that I had called them on 5/3 and they refused a counseling call with a pharmacist. She stated that they were overwhelmed with all the calls from . Reviewed potential effects of elevated statin levels. Informed her that I would send another message to her provider, Dr. Mukherjee, and that they can be in touch with him as well.

## 2024-06-04 DIAGNOSIS — E78.5 DYSLIPIDEMIA ASSOCIATED WITH TYPE 2 DIABETES MELLITUS (MULTI): Primary | ICD-10-CM

## 2024-06-04 DIAGNOSIS — E11.69 DYSLIPIDEMIA ASSOCIATED WITH TYPE 2 DIABETES MELLITUS (MULTI): Primary | ICD-10-CM

## 2024-06-04 RX ORDER — ROSUVASTATIN CALCIUM 10 MG/1
10 TABLET, COATED ORAL DAILY
Qty: 100 TABLET | Refills: 3 | Status: SHIPPED | OUTPATIENT
Start: 2024-06-04 | End: 2024-06-04 | Stop reason: SDUPTHER

## 2024-06-04 RX ORDER — ROSUVASTATIN CALCIUM 10 MG/1
10 TABLET, COATED ORAL DAILY
Qty: 100 TABLET | Refills: 3 | Status: SHIPPED | OUTPATIENT
Start: 2024-06-04 | End: 2025-07-09

## 2024-06-07 ENCOUNTER — SPECIALTY PHARMACY (OUTPATIENT)
Dept: PHARMACY | Facility: CLINIC | Age: 64
End: 2024-06-07

## 2024-06-07 NOTE — PROGRESS NOTES
Spoke with patient's wife, Nia, to discuss the interaction with Mavyret and atorvastatin. She stated that they had already stopped the atorvastatin and picked up the rosuvastatin. Advised that they would need to follow up with Dr. Mukherjee for labwork in about 3 months.    Nia had questions about the importance of labwork and discussed its importance and that it is recommended that the HCV RNA be checked, but that it was their choice if they prefer not to be monitored.    She also stated that she had a question for Dr. Park about the MRI; provided the number for his office.

## 2024-06-10 ENCOUNTER — TUMOR BOARD CONFERENCE (OUTPATIENT)
Dept: HEMATOLOGY/ONCOLOGY | Facility: HOSPITAL | Age: 64
End: 2024-06-10
Payer: COMMERCIAL

## 2024-06-10 DIAGNOSIS — B18.2 HEPATIC CIRRHOSIS DUE TO CHRONIC HEPATITIS C INFECTION (MULTI): Primary | ICD-10-CM

## 2024-06-10 DIAGNOSIS — K74.60 HEPATIC CIRRHOSIS DUE TO CHRONIC HEPATITIS C INFECTION (MULTI): Primary | ICD-10-CM

## 2024-06-10 DIAGNOSIS — R16.0 LIVER MASS: ICD-10-CM

## 2024-06-10 NOTE — TUMOR BOARD NOTE
HEPATOLOGY ATTENDING NOTE    The patient was discussed at liver radiology tumor board today.    He likely has cirrhosis, but not completely diagnostic on the MRI.     There is a large (6.6 cm) lesion which is not exactly classic for HCC and could also be cholangiocarcinoma. There is also a question of a small satellite lesion.    We need to have him get CA 19-9, CEA, and non-contrast chest CT.    We need a biopsy of the liver mass as well as a biopsy of the background liver.    He also needs an EGD to assess for varices. He is also due for his colonoscopy.    In the meantime, the surgeons want to see him to discuss possible resection options (while the above evaluation is being done).    I spoke to the patient about all of these results and recommendations. I also entered all of these orders.      SUZAN Park.

## 2024-06-11 ENCOUNTER — LAB (OUTPATIENT)
Dept: LAB | Facility: LAB | Age: 64
End: 2024-06-11
Payer: COMMERCIAL

## 2024-06-11 DIAGNOSIS — K74.60 HEPATIC CIRRHOSIS DUE TO CHRONIC HEPATITIS C INFECTION (MULTI): ICD-10-CM

## 2024-06-11 DIAGNOSIS — B18.2 CHRONIC HEPATITIS C WITHOUT HEPATIC COMA (MULTI): ICD-10-CM

## 2024-06-11 DIAGNOSIS — R16.0 LIVER MASS: ICD-10-CM

## 2024-06-11 DIAGNOSIS — B18.2 HEPATIC CIRRHOSIS DUE TO CHRONIC HEPATITIS C INFECTION (MULTI): ICD-10-CM

## 2024-06-11 LAB
ALBUMIN SERPL BCP-MCNC: 4.5 G/DL (ref 3.4–5)
ALP SERPL-CCNC: 82 U/L (ref 33–136)
ALT SERPL W P-5'-P-CCNC: 19 U/L (ref 10–52)
ANION GAP SERPL CALC-SCNC: 14 MMOL/L (ref 10–20)
AST SERPL W P-5'-P-CCNC: 18 U/L (ref 9–39)
BILIRUB DIRECT SERPL-MCNC: 0.5 MG/DL (ref 0–0.3)
BILIRUB SERPL-MCNC: 1.7 MG/DL (ref 0–1.2)
BUN SERPL-MCNC: 19 MG/DL (ref 6–23)
CALCIUM SERPL-MCNC: 9.8 MG/DL (ref 8.6–10.3)
CHLORIDE SERPL-SCNC: 104 MMOL/L (ref 98–107)
CO2 SERPL-SCNC: 22 MMOL/L (ref 21–32)
CREAT SERPL-MCNC: 1.1 MG/DL (ref 0.5–1.3)
EGFRCR SERPLBLD CKD-EPI 2021: 74 ML/MIN/1.73M*2
ERYTHROCYTE [DISTWIDTH] IN BLOOD BY AUTOMATED COUNT: 12.8 % (ref 11.5–14.5)
GLUCOSE SERPL-MCNC: 106 MG/DL (ref 74–99)
HCT VFR BLD AUTO: 48.7 % (ref 41–52)
HGB BLD-MCNC: 16.8 G/DL (ref 13.5–17.5)
INR PPP: 0.9 (ref 0.9–1.1)
MCH RBC QN AUTO: 36.1 PG (ref 26–34)
MCHC RBC AUTO-ENTMCNC: 34.5 G/DL (ref 32–36)
MCV RBC AUTO: 105 FL (ref 80–100)
NRBC BLD-RTO: 0 /100 WBCS (ref 0–0)
PLATELET # BLD AUTO: 168 X10*3/UL (ref 150–450)
POTASSIUM SERPL-SCNC: 4.4 MMOL/L (ref 3.5–5.3)
PROT SERPL-MCNC: 7.4 G/DL (ref 6.4–8.2)
PROTHROMBIN TIME: 10.3 SECONDS (ref 9.8–12.8)
RBC # BLD AUTO: 4.66 X10*6/UL (ref 4.5–5.9)
SODIUM SERPL-SCNC: 136 MMOL/L (ref 136–145)
WBC # BLD AUTO: 8.4 X10*3/UL (ref 4.4–11.3)

## 2024-06-11 PROCEDURE — 82378 CARCINOEMBRYONIC ANTIGEN: CPT

## 2024-06-11 PROCEDURE — 85027 COMPLETE CBC AUTOMATED: CPT

## 2024-06-11 PROCEDURE — 86301 IMMUNOASSAY TUMOR CA 19-9: CPT

## 2024-06-11 PROCEDURE — 87522 HEPATITIS C REVRS TRNSCRPJ: CPT

## 2024-06-11 PROCEDURE — 82248 BILIRUBIN DIRECT: CPT

## 2024-06-11 PROCEDURE — RXMED WILLOW AMBULATORY MEDICATION CHARGE

## 2024-06-11 PROCEDURE — 80053 COMPREHEN METABOLIC PANEL: CPT

## 2024-06-11 PROCEDURE — 85610 PROTHROMBIN TIME: CPT

## 2024-06-11 PROCEDURE — 36415 COLL VENOUS BLD VENIPUNCTURE: CPT

## 2024-06-12 ENCOUNTER — PHARMACY VISIT (OUTPATIENT)
Dept: PHARMACY | Facility: CLINIC | Age: 64
End: 2024-06-12
Payer: COMMERCIAL

## 2024-06-12 LAB
CANCER AG19-9 SERPL-ACNC: 35.77 U/ML
CEA SERPL-MCNC: 3.8 UG/L
HCV RNA SERPL NAA+PROBE-ACNC: NOT DETECTED K[IU]/ML
HCV RNA SERPL NAA+PROBE-LOG IU: NORMAL {LOG_IU}/ML

## 2024-06-13 ENCOUNTER — HOSPITAL ENCOUNTER (OUTPATIENT)
Dept: RADIOLOGY | Facility: HOSPITAL | Age: 64
Discharge: HOME | End: 2024-06-13
Payer: COMMERCIAL

## 2024-06-13 DIAGNOSIS — R16.0 LIVER MASS: ICD-10-CM

## 2024-06-13 DIAGNOSIS — B18.2 HEPATIC CIRRHOSIS DUE TO CHRONIC HEPATITIS C INFECTION (MULTI): ICD-10-CM

## 2024-06-13 DIAGNOSIS — K74.60 HEPATIC CIRRHOSIS DUE TO CHRONIC HEPATITIS C INFECTION (MULTI): ICD-10-CM

## 2024-06-13 PROCEDURE — 71250 CT THORAX DX C-: CPT

## 2024-06-14 ENCOUNTER — SPECIALTY PHARMACY (OUTPATIENT)
Dept: PHARMACY | Facility: CLINIC | Age: 64
End: 2024-06-14

## 2024-06-14 NOTE — PROGRESS NOTES
Patient's wife Nia had left me a message inquiring about which meds, aside from the aspirin need to be held the day of their biopsy. She was called back to discuss that they would need to discuss this with the radiology department. She stated that a nurse in radiology had left them a message to discuss pre-procedural prep and that they would try to get in touch with him today.    Reviewed that the HCV RNA was not detected and that he should still complete the Mavyret regimen.    She stated that they have many appointments/procedures that they are trying to take care of at the moment and as such, they were not interested in completing end of therapy labwork. Advised that they should scheduled follow up with Dr. Park following HCV therapy completion.

## 2024-06-17 ENCOUNTER — HOSPITAL ENCOUNTER (OUTPATIENT)
Dept: RADIOLOGY | Facility: HOSPITAL | Age: 64
Discharge: HOME | End: 2024-06-17
Payer: COMMERCIAL

## 2024-06-17 ENCOUNTER — APPOINTMENT (OUTPATIENT)
Dept: PRIMARY CARE | Facility: CLINIC | Age: 64
End: 2024-06-17
Payer: COMMERCIAL

## 2024-06-17 VITALS
SYSTOLIC BLOOD PRESSURE: 138 MMHG | BODY MASS INDEX: 34.96 KG/M2 | RESPIRATION RATE: 14 BRPM | OXYGEN SATURATION: 96 % | HEIGHT: 69 IN | WEIGHT: 236 LBS | HEART RATE: 89 BPM | DIASTOLIC BLOOD PRESSURE: 86 MMHG | TEMPERATURE: 99 F

## 2024-06-17 DIAGNOSIS — R16.0 LIVER MASS: ICD-10-CM

## 2024-06-17 DIAGNOSIS — B18.2 HEPATIC CIRRHOSIS DUE TO CHRONIC HEPATITIS C INFECTION (MULTI): ICD-10-CM

## 2024-06-17 DIAGNOSIS — K74.60 HEPATIC CIRRHOSIS DUE TO CHRONIC HEPATITIS C INFECTION (MULTI): ICD-10-CM

## 2024-06-17 PROCEDURE — 2500000004 HC RX 250 GENERAL PHARMACY W/ HCPCS (ALT 636 FOR OP/ED): Performed by: RADIOLOGY

## 2024-06-17 PROCEDURE — 76942 ECHO GUIDE FOR BIOPSY: CPT

## 2024-06-17 PROCEDURE — 99152 MOD SED SAME PHYS/QHP 5/>YRS: CPT | Performed by: RADIOLOGY

## 2024-06-17 PROCEDURE — 7100000009 HC PHASE TWO TIME - INITIAL BASE CHARGE

## 2024-06-17 PROCEDURE — 2720000007 HC OR 272 NO HCPCS

## 2024-06-17 PROCEDURE — 76942 ECHO GUIDE FOR BIOPSY: CPT | Performed by: RADIOLOGY

## 2024-06-17 PROCEDURE — 47000 NEEDLE BIOPSY OF LIVER PERQ: CPT | Performed by: RADIOLOGY

## 2024-06-17 PROCEDURE — 2500000005 HC RX 250 GENERAL PHARMACY W/O HCPCS: Performed by: RADIOLOGY

## 2024-06-17 PROCEDURE — 88307 TISSUE EXAM BY PATHOLOGIST: CPT | Mod: TC,PORLAB | Performed by: INTERNAL MEDICINE

## 2024-06-17 PROCEDURE — 7100000010 HC PHASE TWO TIME - EACH INCREMENTAL 1 MINUTE

## 2024-06-17 RX ORDER — LIDOCAINE HYDROCHLORIDE 20 MG/ML
INJECTION, SOLUTION EPIDURAL; INFILTRATION; INTRACAUDAL; PERINEURAL
Status: COMPLETED | OUTPATIENT
Start: 2024-06-17 | End: 2024-06-17

## 2024-06-17 RX ORDER — FENTANYL CITRATE 50 UG/ML
INJECTION, SOLUTION INTRAMUSCULAR; INTRAVENOUS
Status: COMPLETED | OUTPATIENT
Start: 2024-06-17 | End: 2024-06-17

## 2024-06-17 RX ORDER — MIDAZOLAM HYDROCHLORIDE 1 MG/ML
INJECTION, SOLUTION INTRAMUSCULAR; INTRAVENOUS
Status: COMPLETED | OUTPATIENT
Start: 2024-06-17 | End: 2024-06-17

## 2024-06-17 ASSESSMENT — PAIN - FUNCTIONAL ASSESSMENT
PAIN_FUNCTIONAL_ASSESSMENT: 0-10

## 2024-06-17 ASSESSMENT — PAIN SCALES - GENERAL
PAINLEVEL_OUTOF10: 0 - NO PAIN

## 2024-06-17 NOTE — DISCHARGE INSTRUCTIONS
If you have any questions, please call the Interventional Radiology Nurse Practitioner Ashwini Fry at [phone] and leave a message. She will return your call the same day if calling before 3 PM M-F. If you call on the weekend you can expect a call back on Monday morning. You may also call the Cath Lab at [phone] M-F, 7-3:30 with any questions. Weekends and after hours please call your referring provider's office number to reach a physician on call.

## 2024-06-17 NOTE — POST-PROCEDURE NOTE
Interventional Radiology Brief Postprocedure Note    Attending: Colin Gallagher MD      Assistant: none    Diagnosis: liver mass    Description of procedure: liver biopsy     Anesthesia:  Local, mod sed    Complications: None    Estimated Blood Loss: minimal      specimens collected  18ga core x 3    See detailed result report with images in PACS.    The patient tolerated the procedure well without incident or complication.

## 2024-06-17 NOTE — NURSING NOTE
Patient VSS. Site check wnl. Ambulated without difficulty. PIV removed Discharge teaching provided to patient and family. Verbalized understanding. Discharged via wheelchair with spouse.

## 2024-06-20 ENCOUNTER — TELEPHONE (OUTPATIENT)
Dept: GASTROENTEROLOGY | Facility: HOSPITAL | Age: 64
End: 2024-06-20
Payer: COMMERCIAL

## 2024-06-20 DIAGNOSIS — B18.2 HEPATIC CIRRHOSIS DUE TO CHRONIC HEPATITIS C INFECTION (MULTI): ICD-10-CM

## 2024-06-20 DIAGNOSIS — R16.0 LIVER MASS: ICD-10-CM

## 2024-06-20 DIAGNOSIS — K74.60 HEPATIC CIRRHOSIS DUE TO CHRONIC HEPATITIS C INFECTION (MULTI): ICD-10-CM

## 2024-06-20 LAB
LAB AP ASR DISCLAIMER: NORMAL
LABORATORY COMMENT REPORT: NORMAL
PATH REPORT.COMMENTS IMP SPEC: NORMAL
PATH REPORT.FINAL DX SPEC: NORMAL
PATH REPORT.GROSS SPEC: NORMAL
PATH REPORT.TOTAL CANCER: NORMAL

## 2024-06-20 NOTE — TELEPHONE ENCOUNTER
Spoke to patient and his wife Nia. Discussed that biopsy results would be discussed at upcoming tumor board conference will follow up with patient once recommendations are final.

## 2024-06-24 ENCOUNTER — TUMOR BOARD CONFERENCE (OUTPATIENT)
Dept: HEMATOLOGY/ONCOLOGY | Facility: HOSPITAL | Age: 64
End: 2024-06-24
Payer: COMMERCIAL

## 2024-06-24 NOTE — TUMOR BOARD NOTE
HEPATOLOGY ATTENDING NOTE      The patient was discussed at liver radiology Tumor Board today.    The chest CT nodules are very tiny. T7 looks more consistent with degenerative disease rather than metastatic.    Dr. Jaiden Peters wants to see him in surgery evaluation. He probably isn't a resection candidate. There could also be more tumor than we can absolutely see (per concerns from the radiologist).    We will do MRI and chest CT without contrast in 3 months.    We will also refer now to IR for possible treatment options. We could theoretically downstage him and consider OLT options. However, currently the tumor is too large for MELD upgrade. He may declare himself on follow-up imaging with more extensive tumor. If so, he would need systemic therapy.     SUZAN Park.   Sent Accu-Chek Guide meter to [place] on [address].

## 2024-06-25 ENCOUNTER — TELEPHONE (OUTPATIENT)
Dept: GASTROENTEROLOGY | Facility: CLINIC | Age: 64
End: 2024-06-25
Payer: COMMERCIAL

## 2024-06-25 DIAGNOSIS — K74.60 HEPATIC CIRRHOSIS DUE TO CHRONIC HEPATITIS C INFECTION (MULTI): ICD-10-CM

## 2024-06-25 DIAGNOSIS — B18.2 HEPATIC CIRRHOSIS DUE TO CHRONIC HEPATITIS C INFECTION (MULTI): ICD-10-CM

## 2024-06-25 DIAGNOSIS — C22.0 HCC (HEPATOCELLULAR CARCINOMA) (MULTI): ICD-10-CM

## 2024-06-25 NOTE — TELEPHONE ENCOUNTER
Called patient spoke to him and his wife discussed the following results and plan per Dr Park:  HEPATOLOGY ATTENDING NOTE      The patient was discussed at liver radiology Tumor Board today.     The chest CT nodules are very tiny. T7 looks more consistent with degenerative disease rather than metastatic.     Dr. Jaiden Peters wants to see him in surgery evaluation. He probably isn't a resection candidate. There could also be more tumor than we can absolutely see (per concerns from the radiologist).     We will do MRI and chest CT without contrast in 3 months.     We will also refer now to IR for possible treatment options. We could theoretically downstage him and consider OLT options. However, currently the tumor is too large for MELD upgrade. He may declare himself on follow-up imaging with more extensive tumor. If so, he would need systemic therapy.      SUZAN Park.

## 2024-06-27 ENCOUNTER — SPECIALTY PHARMACY (OUTPATIENT)
Dept: PHARMACY | Facility: CLINIC | Age: 64
End: 2024-06-27

## 2024-06-27 NOTE — PROGRESS NOTES
I spoke to the patient's wife on 6/27/2024, to inquire if the patient had completed his HCV regimen. She stated that the patient did complete it without any side effects or missed doses. She was informed that his HCV-RNA was not detected. She was reminded that SVR labs are due the end of September and to follow up with the office soon after. She verbalized understanding.

## 2024-07-08 PROCEDURE — RXMED WILLOW AMBULATORY MEDICATION CHARGE

## 2024-07-10 ENCOUNTER — APPOINTMENT (OUTPATIENT)
Dept: SURGERY | Facility: CLINIC | Age: 64
End: 2024-07-10
Payer: COMMERCIAL

## 2024-07-10 ENCOUNTER — OFFICE VISIT (OUTPATIENT)
Dept: SURGERY | Facility: CLINIC | Age: 64
End: 2024-07-10
Payer: COMMERCIAL

## 2024-07-10 ENCOUNTER — PREP FOR PROCEDURE (OUTPATIENT)
Dept: RADIOLOGY | Facility: HOSPITAL | Age: 64
End: 2024-07-10

## 2024-07-10 VITALS
DIASTOLIC BLOOD PRESSURE: 99 MMHG | HEIGHT: 69 IN | BODY MASS INDEX: 34.96 KG/M2 | WEIGHT: 236 LBS | TEMPERATURE: 97.7 F | SYSTOLIC BLOOD PRESSURE: 151 MMHG | HEART RATE: 94 BPM | OXYGEN SATURATION: 98 %

## 2024-07-10 DIAGNOSIS — R16.0 LIVER MASS: ICD-10-CM

## 2024-07-10 DIAGNOSIS — R16.0 LIVER MASS: Primary | ICD-10-CM

## 2024-07-10 DIAGNOSIS — B18.2 HEPATIC CIRRHOSIS DUE TO CHRONIC HEPATITIS C INFECTION (MULTI): ICD-10-CM

## 2024-07-10 DIAGNOSIS — K74.60 HEPATIC CIRRHOSIS DUE TO CHRONIC HEPATITIS C INFECTION (MULTI): ICD-10-CM

## 2024-07-10 DIAGNOSIS — C22.0 HCC (HEPATOCELLULAR CARCINOMA) (MULTI): ICD-10-CM

## 2024-07-10 PROCEDURE — 1126F AMNT PAIN NOTED NONE PRSNT: CPT | Performed by: TRANSPLANT SURGERY

## 2024-07-10 PROCEDURE — 3060F POS MICROALBUMINURIA REV: CPT | Performed by: TRANSPLANT SURGERY

## 2024-07-10 PROCEDURE — 3048F LDL-C <100 MG/DL: CPT | Performed by: TRANSPLANT SURGERY

## 2024-07-10 PROCEDURE — 3044F HG A1C LEVEL LT 7.0%: CPT | Performed by: TRANSPLANT SURGERY

## 2024-07-10 PROCEDURE — 3080F DIAST BP >= 90 MM HG: CPT | Performed by: TRANSPLANT SURGERY

## 2024-07-10 PROCEDURE — 3077F SYST BP >= 140 MM HG: CPT | Performed by: TRANSPLANT SURGERY

## 2024-07-10 PROCEDURE — 3008F BODY MASS INDEX DOCD: CPT | Performed by: TRANSPLANT SURGERY

## 2024-07-10 PROCEDURE — 99205 OFFICE O/P NEW HI 60 MIN: CPT | Performed by: TRANSPLANT SURGERY

## 2024-07-10 PROCEDURE — 4010F ACE/ARB THERAPY RXD/TAKEN: CPT | Performed by: TRANSPLANT SURGERY

## 2024-07-10 PROCEDURE — 1159F MED LIST DOCD IN RCRD: CPT | Performed by: TRANSPLANT SURGERY

## 2024-07-10 ASSESSMENT — PATIENT HEALTH QUESTIONNAIRE - PHQ9
1. LITTLE INTEREST OR PLEASURE IN DOING THINGS: NOT AT ALL
2. FEELING DOWN, DEPRESSED OR HOPELESS: NOT AT ALL
SUM OF ALL RESPONSES TO PHQ9 QUESTIONS 1 & 2: 0

## 2024-07-10 ASSESSMENT — ENCOUNTER SYMPTOMS
LOSS OF SENSATION IN FEET: 0
OCCASIONAL FEELINGS OF UNSTEADINESS: 0
DEPRESSION: 0

## 2024-07-10 ASSESSMENT — PAIN SCALES - GENERAL: PAINLEVEL: 0-NO PAIN

## 2024-07-10 NOTE — PROGRESS NOTES
Subjective   Patient ID: Patient is a 63 y.o. male who presents for surgical discussion regarding liver mass.    History of hepatitis C (treated), suspected cirrhosis, thrombocytopenia    MRI from April noted right hepatic mass, surgical pathology from June demonstrated moderately-differentiated HCC.    Referral from Dr. Park to assess liver resection candidacy, discussed at tumor board.      Review of Systems:  Constitutional:  Negative for chills and fever.   HENT: Negative.  Negative for congestion.    Eyes: Negative.    Respiratory:  Negative for cough and shortness of breath.    Cardiovascular:  Negative for chest pain.   Gastrointestinal:  Negative for abdominal distention, abdominal pain, constipation and diarrhea.   Endocrine: Negative for cold intolerance and heat intolerance.   Genitourinary:  Negative for dysuria, frequency, hematuria and urgency.   Musculoskeletal:  Negative for arthralgias.   Skin:  Negative for color change.   Allergic/Immunologic: Negative for environmental allergies.   Neurological:  Negative for dizziness, weakness and light-headedness.   Hematological:  Negative for adenopathy.   Psychiatric/Behavioral:  Negative for agitation, confusion and hallucinations.      Physical Exam:  Constitutional:       Appearance: Normal appearance.   HENT:      Head: Normocephalic and atraumatic.      Nose: Nose normal.   Eyes:      Pupils: Pupils are equal, round, and reactive to light.   Cardiovascular:      Rate and Rhythm: Normal rate.   Pulmonary:      Effort: Pulmonary effort is normal. No respiratory distress.   Abdominal:      General: There is no distension.      Palpations: Abdomen is soft. There is no mass.   Musculoskeletal:         General: No swelling. Normal range of motion.      Cervical back: Normal range of motion.   Skin:     General: Skin is warm and dry.   Neurological:      General: No focal deficit present.      Mental Status: Alert and oriented to person, place, and time.    Psychiatric:         Mood and Affect: Mood normal.         Behavior: Behavior normal.     Lab Results   Component Value Date    WBC 9.0 07/31/2024    HGB 16.5 07/31/2024    HCT 47.3 07/31/2024     (H) 07/31/2024     07/31/2024     Lab Results   Component Value Date    GLUCOSE 113 (H) 07/31/2024    CALCIUM 9.7 07/31/2024     07/31/2024    K 4.5 07/31/2024    CO2 24 07/31/2024     07/31/2024    BUN 19 07/31/2024    CREATININE 1.07 07/31/2024     Lab Results   Component Value Date    ALT 30 07/31/2024    AST 23 07/31/2024    ALKPHOS 65 07/31/2024    BILITOT 0.6 07/31/2024     MELD 3.0: 8 at 7/31/2024  9:57 AM  MELD-Na: 7 at 7/31/2024  9:57 AM  Calculated from:  Serum Creatinine: 1.07 mg/dL at 7/31/2024  9:57 AM  Serum Sodium: 136 mmol/L at 7/31/2024  9:57 AM  Total Bilirubin: 0.6 mg/dL (Using min of 1 mg/dL) at 7/31/2024  9:57 AM  Serum Albumin: 4.4 g/dL (Using max of 3.5 g/dL) at 7/31/2024  9:57 AM  INR(ratio): 0.9 (Using min of 1) at 7/31/2024  9:57 AM  Age at listing (hypothetical): 63 years  Sex: Male at 7/31/2024  9:57 AM            Assessment/Plan:    Cirrhosis without decompensation. Active smoker  Given the location and size of the tumor, surgery would require major resection. Central lobectomy. I do not think his liver would tolerate such resection.   He is at significant risk for liver decompensation and liver failure post surgery  I think the best option would be LRT by [facility]

## 2024-07-10 NOTE — CONSULTS
INTERVENTIONAL RADIOLOGY OUTPATIENT CONSULTATION  [facility]    Subjective  Hemant Ness, 63 y.o. male with newly diagnosed hepatitis C, found on lab work in April 2024. He then underwent an ultrasound + MRI which were concerning for hepatic cirrhotic features and a large lesion measuring 6.6 cm abutting the left hepatic vein. This lesion was subsequently found to be biopsy proven HCC. He was recently presented at tumor board on 6/24. Per discussions, surgery does not feel strongly that he is a resectable candidate with the tumor's current size/location. There is also concern that there could be a larger tumor burden on the liver than what is currently visibile. Given this, patient was referred to IR for possible treatment options, I.e downstaging options.      Review of Systems   Constitutional: Negative.  Negative for activity change.   Respiratory:  Negative for chest tightness and shortness of breath.    Cardiovascular:  Negative for chest pain.   Gastrointestinal:  Positive for abdominal pain.   Skin:  Negative for color change.   Neurological:  Negative for dizziness and headaches.         Past Medical History:   Diagnosis Date    Cirrhosis (Multi)     Coronary artery disease     Diabetes mellitus (Multi)     Hepatitis C     Hyperlipidemia, unspecified 12/17/2021    Dyslipidemia    Hypertension     Personal history of other endocrine, nutritional and metabolic disease 12/17/2021    History of diabetes mellitus    Personal history of other endocrine, nutritional and metabolic disease 12/17/2021    History of type 2 diabetes mellitus    Personal history of other endocrine, nutritional and metabolic disease 04/08/2022    History of type 2 diabetes mellitus    Personal history of other specified conditions 09/10/2021    History of chest pain    STEMI (ST elevation myocardial infarction) (Multi) 09/07/2023     Past Surgical History:   Procedure Laterality Date    OTHER SURGICAL HISTORY  07/23/2021     Cardiac catheterization with stent placement    OTHER SURGICAL HISTORY  07/29/2021    Cyst excision    OTHER SURGICAL HISTORY  07/29/2021    Cholecystectomy    OTHER SURGICAL HISTORY  07/29/2021    Shoulder surgery     Social History     Socioeconomic History    Marital status:      Spouse name: Not on file    Number of children: Not on file    Years of education: Not on file    Highest education level: Not on file   Occupational History    Not on file   Tobacco Use    Smoking status: Every Day     Current packs/day: 0.50     Types: Cigarettes    Smokeless tobacco: Never   Vaping Use    Vaping status: Never Used   Substance and Sexual Activity    Alcohol use: Yes     Alcohol/week: 8.0 standard drinks of alcohol     Types: 8 Standard drinks or equivalent per week    Drug use: Yes     Types: Marijuana    Sexual activity: Yes   Other Topics Concern    Not on file   Social History Narrative    Not on file     Social Determinants of Health     Financial Resource Strain: Not on file   Food Insecurity: Not on file   Transportation Needs: Not on file   Physical Activity: Not on file   Stress: Not on file   Social Connections: Not on file   Intimate Partner Violence: Not on file   Housing Stability: Not on file     Family History   Family history unknown: Yes     Allergies   Allergen Reactions    Metformin Diarrhea     Current Outpatient Medications on File Prior to Encounter   Medication Sig Dispense Refill    aspirin 81 mg EC tablet Take 1 tablet (81 mg) by mouth once daily.      dulaglutide (TRULICITY) 4.5 mg/0.5 mL pen injector INJECT ONE PEN (4.5 MG) UNDER THE SKIN ONCE WEEKLY 2 mL 11    empagliflozin (Jardiance) 25 mg TAKE 1 TABLET (25 MG) BY MOUTH ONCE DAILY. 90 tablet 3    LORazepam (Ativan) 0.5 mg tablet Take 1-2 tablets (0.5-1 mg) by mouth see administration instructions. Take 30 minutes prior to procedure. 2 tablet 0    losartan (Cozaar) 100 mg tablet TAKE 1 TABLET BY MOUTH once daily 90 tablet 0     metoprolol succinate XL (Toprol XL) 100 mg 24 hr tablet Take 1 tablet (100 mg) by mouth once daily. Do not crush or chew. 90 tablet 3    nitroglycerin (Nitrostat) 0.4 mg SL tablet Place under the tongue.      OneTouch Delica Plus Lancet 30 gauge misc       OneTouch Ultra Test strip       OneTouch Ultra2 Meter misc       rosuvastatin (Crestor) 10 mg tablet Take 1 tablet (10 mg) by mouth once daily. 100 tablet 3     No current facility-administered medications on file prior to encounter.       Objective    There were no vitals filed for this visit.    GENERAL APPEARANCE:  AxOx4, generally well-appearing no acute distress.  HEART:  Normal rate and regular rhythm  LUNGS:  Equal chest rise and fall, non-labored breathing  ABDOMEN:  Soft, nontender, nondistended  EXTREMITIES:  Without cyanosis, clubbing or edema.  NEUROLOGICAL:  Grossly nonfocal. Alert and oriented, moving all 4 extremities.   Skin:  Warm and dry without any rash.      Pertinent Imaging  MR liver w and wo IV contrast 05/24/2024    Narrative  Interpreted By:  Dharmesh Kelley,  STUDY:  MR LIVER W AND WO IV CONTRAST;  5/24/2024 9:21 am    INDICATION:  Signs/Symptoms:5 cm liver mass suspicious for hepatoma in the setting  of cirrhosis.    COMPARISON:  April 17, 2024 abdominal ultrasound    ACCESSION NUMBER(S):  [id number]    ORDERING CLINICIAN:  SYLVIA ELLIS    TECHNIQUE:  MRI LIVER; Multiplanar magnetic resonance images of the abdomen were  obtained including the following sequences; T2-weighted SSFSE with  and without fat saturation, T1-weighted GRE in/opposed phase, DWI,  fat saturated 3D-T1w GRE pre and dynamically post contrast. 22 mL  were administered intravenously without immediate complication.    FINDINGS:  LIVER:  Liver measures 19 cm length. The margin is mildly lobulated without  other overt cirrhotic features. Normal enhancement. Hepatic  parenchyma is isointense on T1 in and out of phase sequences.    There is a LI-RADS 5 early enhancing  lesion with capsule and washout  central segment 8 series 15, image 27 measuring 3.6 x 3.2 cm  extending to the margin of the adjacent hepatic vein branch. There is  a larger area pronounced arterial phase hyperenhancement extending to  the dome of the right lobe with corresponding T2 signal alteration  estimated at 6.6 cm craniocaudal extent series 2, image 22 at least  some of which likely represents additional tumor given the T2 signal  alteration. A portion of the enhancement could be benign perfusion  related.    There is focal dilation of a portion of the intrahepatic ducts at the  level of the lesion likely related to malignant obstruction series 2,  image 20.    BILE DUCTS:  The extrahepatic bile ducts are normal caliber.    GALLBLADDER:  Cholecystectomy    PANCREAS:  Normal signal intensity. Normal enhancement. No masses. The  pancreatic duct is normal.    SPLEEN:  Within normal limits.    ADRENAL GLANDS:  Within normal limits.    KIDNEYS:  Within normal limits.    LYMPH NODES:  Mildly prominent portal caval and hepatic nodes including dominant 16  mm short axis hepatic node series 22, image 26.    ABDOMINAL VESSELS:  Atherosclerosis without aortic aneurysm. Superior mesenteric vein,  splenic vein, and main, right and left portal vein are patent.  Hepatic veins are patent.  No significant collaterals or esophageal  varices are present.    BOWEL:  The stomach and visualized bowel are nondistended without wall  thickening or surrounding inflammatory change. There is colonic  diverticulosis.    PERITONEUM/RETROPERITONEUM:  No ascites.    BONES AND LOWER THORAX:  No abnormally enhancing focal bony lesions are identified. Multilevel  discogenic degenerative disease is noted in several levels of the  thoraco- lumbar spine.  The visualized lower lung fields are  unremarkable. The heart is normal in size.    Impression  1.  TI-RADS 5 liver lesion in the setting of potential cirrhosis  favors HCC versus combined HCC  cholangiocarcinoma given the  asymmetric enhancement pattern. The overall extent of the abnormality  is estimated at up to 6.6 cm.  2. Mild focal dilation of the intrahepatic ducts likely related to  malignant obstruction.  3. The central liver lesion is contiguous with hepatic vein branch.  The hepatic veins and portal veins are grossly patent.  4. Mildly prominent portal hepatic region nodes are nonspecific.  Attention recommended at follow-up.      Signed by: Dharmesh Kelley 2024 5:04 PM  Dictation workstation:   [id number]        Assessment & Plan   MELD 3.0: 10 at 2024 11:12 AM  MELD-Na: 9 at 2024 11:12 AM  Calculated from:  Serum Creatinine: 1.10 mg/dL at 2024 11:12 AM  Serum Sodium: 136 mmol/L at 2024 11:12 AM  Total Bilirubin: 1.7 mg/dL at 2024 11:12 AM  Serum Albumin: 4.5 g/dL (Using max of 3.5 g/dL) at 2024 11:12 AM  INR(ratio): 0.9 (Using min of 1) at 2024 11:12 AM  Age at listing (hypothetical): 63 years  Sex: Male at 2024 11:12 AM      ECO- Fully active, able to carry on all pre-disease performance w/o restriction.        Current plan:   - Plan for Y90 mapping in the upcoming weeks  - Basic labs obtained today  - Plan for CTA A/P to evaluate vasculature  - Outpatient scheduling will contact him regarding necessary pre-procedure requirements    Patient was discussed with attending physician, Dr. Isabella Mitchell DO  IR Integrated PGY2        Vascular and Interventional Radiology  [facility]  [phone] Nursing  [phone] Scheduling

## 2024-07-11 ENCOUNTER — HOSPITAL ENCOUNTER (OUTPATIENT)
Dept: RADIOLOGY | Facility: HOSPITAL | Age: 64
Discharge: HOME | End: 2024-07-11
Payer: COMMERCIAL

## 2024-07-11 ENCOUNTER — PHARMACY VISIT (OUTPATIENT)
Dept: PHARMACY | Facility: CLINIC | Age: 64
End: 2024-07-11
Payer: COMMERCIAL

## 2024-07-11 DIAGNOSIS — B18.2 HEPATIC CIRRHOSIS DUE TO CHRONIC HEPATITIS C INFECTION (MULTI): ICD-10-CM

## 2024-07-11 DIAGNOSIS — C22.0 HCC (HEPATOCELLULAR CARCINOMA) (MULTI): ICD-10-CM

## 2024-07-11 DIAGNOSIS — K74.60 HEPATIC CIRRHOSIS DUE TO CHRONIC HEPATITIS C INFECTION (MULTI): ICD-10-CM

## 2024-07-11 ASSESSMENT — ENCOUNTER SYMPTOMS
HEADACHES: 0
CHEST TIGHTNESS: 0
CONSTITUTIONAL NEGATIVE: 1
SHORTNESS OF BREATH: 0
ACTIVITY CHANGE: 0
DIZZINESS: 0
ABDOMINAL PAIN: 1
COLOR CHANGE: 0

## 2024-07-19 ENCOUNTER — TELEPHONE (OUTPATIENT)
Dept: PRIMARY CARE | Facility: CLINIC | Age: 64
End: 2024-07-19
Payer: COMMERCIAL

## 2024-07-19 NOTE — TELEPHONE ENCOUNTER
Refill request:    Gabapentin 100 mg - He didn't thin they were helping him, but realized they do. His feet are very painful    Pharmacy: [facility]

## 2024-07-19 NOTE — TELEPHONE ENCOUNTER
Med Refill   gabapentin (Neurontin) 100 mg capsule [[id number]]  DISCONTINUED     [id number] - [place], [place] - [address] [address]  Phone: [phone]  Fax: [phone]  SANDHYA #: --     LOV: 5/14/24     NOV: 8/15/24

## 2024-07-21 DIAGNOSIS — E11.51 DIABETES MELLITUS TYPE 2 WITH PERIPHERAL ARTERY DISEASE (MULTI): Primary | ICD-10-CM

## 2024-07-21 RX ORDER — GABAPENTIN 100 MG/1
100 CAPSULE ORAL NIGHTLY
Qty: 90 CAPSULE | Refills: 3 | Status: SHIPPED | OUTPATIENT
Start: 2024-07-21 | End: 2024-07-22 | Stop reason: SDUPTHER

## 2024-07-22 DIAGNOSIS — E11.51 DIABETES MELLITUS TYPE 2 WITH PERIPHERAL ARTERY DISEASE (MULTI): ICD-10-CM

## 2024-07-22 RX ORDER — GABAPENTIN 100 MG/1
100 CAPSULE ORAL NIGHTLY
Qty: 90 CAPSULE | Refills: 3 | Status: SHIPPED | OUTPATIENT
Start: 2024-07-22 | End: 2025-07-22

## 2024-07-31 ENCOUNTER — APPOINTMENT (OUTPATIENT)
Dept: PHARMACY | Facility: HOSPITAL | Age: 64
End: 2024-07-31
Payer: COMMERCIAL

## 2024-07-31 ENCOUNTER — LAB (OUTPATIENT)
Dept: LAB | Facility: LAB | Age: 64
End: 2024-07-31
Payer: COMMERCIAL

## 2024-07-31 DIAGNOSIS — E11.65 TYPE 2 DIABETES MELLITUS WITH HYPERGLYCEMIA, WITHOUT LONG-TERM CURRENT USE OF INSULIN (MULTI): ICD-10-CM

## 2024-07-31 DIAGNOSIS — Z86.39 HISTORY OF NON-INSULIN DEPENDENT TYPE 2 DIABETES MELLITUS: ICD-10-CM

## 2024-07-31 DIAGNOSIS — R16.0 LIVER MASS: ICD-10-CM

## 2024-07-31 LAB
ALBUMIN SERPL BCP-MCNC: 4.4 G/DL (ref 3.4–5)
ALP SERPL-CCNC: 65 U/L (ref 33–136)
ALT SERPL W P-5'-P-CCNC: 30 U/L (ref 10–52)
ANION GAP SERPL CALC-SCNC: 14 MMOL/L (ref 10–20)
AST SERPL W P-5'-P-CCNC: 23 U/L (ref 9–39)
BILIRUB SERPL-MCNC: 0.6 MG/DL (ref 0–1.2)
BUN SERPL-MCNC: 19 MG/DL (ref 6–23)
CALCIUM SERPL-MCNC: 9.7 MG/DL (ref 8.6–10.3)
CHLORIDE SERPL-SCNC: 103 MMOL/L (ref 98–107)
CO2 SERPL-SCNC: 24 MMOL/L (ref 21–32)
CREAT SERPL-MCNC: 1.07 MG/DL (ref 0.5–1.3)
EGFRCR SERPLBLD CKD-EPI 2021: 77 ML/MIN/1.73M*2
ERYTHROCYTE [DISTWIDTH] IN BLOOD BY AUTOMATED COUNT: 13.5 % (ref 11.5–14.5)
GLUCOSE SERPL-MCNC: 113 MG/DL (ref 74–99)
HCT VFR BLD AUTO: 47.3 % (ref 41–52)
HGB BLD-MCNC: 16.5 G/DL (ref 13.5–17.5)
INR PPP: 0.9 (ref 0.9–1.1)
MCH RBC QN AUTO: 36.7 PG (ref 26–34)
MCHC RBC AUTO-ENTMCNC: 34.9 G/DL (ref 32–36)
MCV RBC AUTO: 105 FL (ref 80–100)
NRBC BLD-RTO: 0 /100 WBCS (ref 0–0)
PLATELET # BLD AUTO: 163 X10*3/UL (ref 150–450)
POTASSIUM SERPL-SCNC: 4.5 MMOL/L (ref 3.5–5.3)
PROT SERPL-MCNC: 7.3 G/DL (ref 6.4–8.2)
PROTHROMBIN TIME: 10.3 SECONDS (ref 9.8–12.8)
RBC # BLD AUTO: 4.49 X10*6/UL (ref 4.5–5.9)
SODIUM SERPL-SCNC: 136 MMOL/L (ref 136–145)
WBC # BLD AUTO: 9 X10*3/UL (ref 4.4–11.3)

## 2024-07-31 PROCEDURE — 85027 COMPLETE CBC AUTOMATED: CPT

## 2024-07-31 PROCEDURE — 80053 COMPREHEN METABOLIC PANEL: CPT

## 2024-07-31 PROCEDURE — 36415 COLL VENOUS BLD VENIPUNCTURE: CPT

## 2024-07-31 PROCEDURE — 85610 PROTHROMBIN TIME: CPT

## 2024-08-01 ENCOUNTER — PHARMACY VISIT (OUTPATIENT)
Dept: PHARMACY | Facility: CLINIC | Age: 64
End: 2024-08-01
Payer: COMMERCIAL

## 2024-08-01 PROCEDURE — RXMED WILLOW AMBULATORY MEDICATION CHARGE

## 2024-08-01 ASSESSMENT — ENCOUNTER SYMPTOMS: DIABETIC ASSOCIATED SYMPTOMS: 0

## 2024-08-01 NOTE — PROGRESS NOTES
Pharmacy Clinic Note    Hemant Ness is a 63 y.o. male was referred to Clinical Pharmacy Team for diabetes management.     Referring Provider: Jose Juan Mukherjee DO    Subjective   Allergies   Allergen Reactions    Metformin Diarrhea       [facility]  [address], [address]  [facility] [address]  Phone: [phone] Fax: [phone]    [facility] - [place], [place] - [address]  [address]  [facility] [address]  [facility] [address]  Phone: [phone] Fax: [phone]    [id number] - [place], [place] - [address]  [address] [facility] [address]  Phone: [phone] Fax: [phone]     Specialty Pharmacy  [address] [facility] [address]  Phone: [phone] Fax: [phone]      Diabetes  He presents for his follow-up diabetic visit. He has type 2 diabetes mellitus. His disease course has been stable. There are no hypoglycemic associated symptoms. There are no diabetic associated symptoms. There are no hypoglycemic complications. There are no diabetic complications. Risk factors for coronary artery disease include diabetes mellitus, male sex and obesity. Current diabetic treatment includes oral agent (monotherapy) (Trulicity 4.5 mg and Jardiance 25 mg). He is compliant with treatment all of the time. His weight is decreasing steadily. His home blood glucose trend is decreasing steadily. His breakfast blood glucose range is generally 110-130 mg/dl. His dinner blood glucose range is generally  mg/dl. His overall blood glucose range is  mg/dl. An ACE inhibitor/angiotensin II receptor blocker is being taken.       Objective     There were no vitals taken for this visit.     LAB  Lab Results   Component Value Date    BILITOT 0.6 07/31/2024    CALCIUM 9.7 07/31/2024    CO2 24 07/31/2024     07/31/2024    CREATININE 1.07 07/31/2024    GLUCOSE 113 (H) 07/31/2024    ALKPHOS 65 07/31/2024    K 4.5 07/31/2024     PROT 7.3 07/31/2024     07/31/2024    AST 23 07/31/2024    ALT 30 07/31/2024    BUN 19 07/31/2024    ANIONGAP 14 07/31/2024    MG 1.89 07/22/2021    ALBUMIN 4.4 07/31/2024    EGFR 77 07/31/2024     Lab Results   Component Value Date    TRIG 112 03/04/2024    CHOL 142 03/04/2024    LDLCALC 81 03/04/2024    HDL 38.3 03/04/2024     Lab Results   Component Value Date    HGBA1C 6.4 (H) 03/04/2024       Current Outpatient Medications on File Prior to Visit   Medication Sig Dispense Refill    aspirin 81 mg EC tablet Take 1 tablet (81 mg) by mouth once daily.      dulaglutide (TRULICITY) 4.5 mg/0.5 mL pen injector INJECT ONE PEN (4.5 MG) UNDER THE SKIN ONCE WEEKLY 2 mL 11    empagliflozin (Jardiance) 25 mg TAKE 1 TABLET (25 MG) BY MOUTH ONCE DAILY. 90 tablet 3    gabapentin (Neurontin) 100 mg capsule Take 1 capsule (100 mg) by mouth once daily at bedtime. 90 capsule 3    LORazepam (Ativan) 0.5 mg tablet Take 1-2 tablets (0.5-1 mg) by mouth see administration instructions. Take 30 minutes prior to procedure. 2 tablet 0    losartan (Cozaar) 100 mg tablet TAKE 1 TABLET BY MOUTH once daily 90 tablet 0    metoprolol succinate XL (Toprol XL) 100 mg 24 hr tablet Take 1 tablet (100 mg) by mouth once daily. Do not crush or chew. 90 tablet 3    nitroglycerin (Nitrostat) 0.4 mg SL tablet Place under the tongue.      OneTouch Delica Plus Lancet 30 gauge misc       OneTouch Ultra Test strip       OneTouch Ultra2 Meter misc       rosuvastatin (Crestor) 10 mg tablet Take 1 tablet (10 mg) by mouth once daily. 100 tablet 3     No current facility-administered medications on file prior to visit.        HISTORICAL PHARMACOTHERAPY  - Metformin: diarrhea (moderate-severe)    Weight down to 236 lbs (previously 260 lbs)    DRUG INTERACTIONS  - No significant drug-drug interactions exist that require change in therapy  _______________________________________________________________________  PATIENT EDUCATION/GOALS    1. Discussed disease  specific goals:   - Fasting B - 130 mg/dL  - Postprandial BG: less than 180 mg/dL  - A1c: less than 7%    2.  Since last visit, patient's BG remain well controlled on Trulicity andd Jardiance. Patient to continue current therapies of Jardiance 25 mg and Trulicity 4.5 mg and follow up in 6 months. Patient currently involved with interventional radiology for liver and will contact team if any questions arise .  _______________________________________________________________________    Assessment/Plan   Problem List Items Addressed This Visit       Type 2 diabetes mellitus with hyperglycemia, without long-term current use of insulin (Multi)     1. Continue Jardiance 25 mg and Trulcity 4.5 mg          Other Visit Diagnoses       History of non-insulin dependent type 2 diabetes mellitus               Continue all meds under the continuation of care with the referring provider and clinical pharmacy team.    Clinical Pharmacist follow-up: 6 months    Sushant Jessica, PharmD     Verbal consent to manage patient's drug therapy was obtained from [the patient and/or an individual authorized to act on behalf of a patient]. They were informed they may decline to participate or withdraw from participation in pharmacy services at any time.

## 2024-08-02 ENCOUNTER — APPOINTMENT (OUTPATIENT)
Dept: UROLOGY | Facility: CLINIC | Age: 64
End: 2024-08-02
Payer: COMMERCIAL

## 2024-08-02 VITALS
DIASTOLIC BLOOD PRESSURE: 92 MMHG | HEART RATE: 84 BPM | WEIGHT: 236 LBS | HEIGHT: 69 IN | SYSTOLIC BLOOD PRESSURE: 142 MMHG | BODY MASS INDEX: 34.96 KG/M2

## 2024-08-02 DIAGNOSIS — N40.0 BPH WITH ELEVATED PSA: ICD-10-CM

## 2024-08-02 DIAGNOSIS — R97.20 BPH WITH ELEVATED PSA: ICD-10-CM

## 2024-08-02 LAB
POC APPEARANCE, URINE: CLEAR
POC BILIRUBIN, URINE: NEGATIVE
POC BLOOD, URINE: NEGATIVE
POC COLOR, URINE: YELLOW
POC GLUCOSE, URINE: ABNORMAL MG/DL
POC KETONES, URINE: NEGATIVE MG/DL
POC LEUKOCYTES, URINE: NEGATIVE
POC NITRITE,URINE: NEGATIVE
POC PH, URINE: 6 PH
POC PROTEIN, URINE: NEGATIVE MG/DL
POC SPECIFIC GRAVITY, URINE: 1.01
POC UROBILINOGEN, URINE: 0.2 EU/DL

## 2024-08-02 PROCEDURE — 4010F ACE/ARB THERAPY RXD/TAKEN: CPT | Performed by: UROLOGY

## 2024-08-02 PROCEDURE — 81003 URINALYSIS AUTO W/O SCOPE: CPT | Performed by: UROLOGY

## 2024-08-02 PROCEDURE — 3044F HG A1C LEVEL LT 7.0%: CPT | Performed by: UROLOGY

## 2024-08-02 PROCEDURE — 99213 OFFICE O/P EST LOW 20 MIN: CPT | Performed by: UROLOGY

## 2024-08-02 PROCEDURE — 3048F LDL-C <100 MG/DL: CPT | Performed by: UROLOGY

## 2024-08-02 PROCEDURE — 3080F DIAST BP >= 90 MM HG: CPT | Performed by: UROLOGY

## 2024-08-02 PROCEDURE — 3077F SYST BP >= 140 MM HG: CPT | Performed by: UROLOGY

## 2024-08-02 PROCEDURE — 3060F POS MICROALBUMINURIA REV: CPT | Performed by: UROLOGY

## 2024-08-02 PROCEDURE — 3008F BODY MASS INDEX DOCD: CPT | Performed by: UROLOGY

## 2024-08-02 PROCEDURE — 1159F MED LIST DOCD IN RCRD: CPT | Performed by: UROLOGY

## 2024-08-02 NOTE — PROGRESS NOTES
08/02/2024  Patient here for elevated PSA.  Voiding well, no complaint.    Patient has no nausea, no vomiting, no fever.    SERA: Deferred    PSA 7.9 back in March 4/19/2024 prostate MRI  IMPRESSION:  1.  BPH changes of the transition zone. Diffuse non nodular  hypointensities within the peripheral zone, without evidence of  focally restricted diffusion ( PI-RADS 2).      PI-RADS 2 - Low (clinically significant cancer is unlikely to be  present).    We discussed rising PSA but negative MRI  We discussed prostate biopsy now versus monitoring  We discussed benign prostate hypertrophy with mild voiding symptom  All the questions were answered, the patient expressed understanding and agreed to the plan.    Impression  Elevated PSA  BPH    Plan  PSA check now, in 6 months  Appointment in 6 months  Possible prostate biopsy in the future        Subjective   Patient ID: Hemant Ness is a 63 y.o. male who presents for Elevated PSA and Benign Prostatic Hypertrophy.  HPI    Review of Systems    Objective   Physical Exam    Assessment/Plan            Cole Peters MD 08/02/24 7:28 AM       10/11/2023 Dr. Chapman  Assessment and Plan:  1. Elevated PSA  I discussed with the patient that prostate cancer is the most common internal malignancy in men in the [place], and that around 12% of [place] men will be diagnosed with prostate cancer in their lifetime. I also explained that the lifetime risk of dying from prostate cancer, however, is only 3%. I discussed that PSA is a screening test to assess the risk of prostate cancer but is not diagnostic of prostate cancer, and that tissue biopsy is needed to confirm the diagnosis.     - MR prostate; Future     Follow up after MRI   Ativan Rx sent for anxiety related to MRI     2. BPH with obstruction/lower urinary tract symptoms  Currently happy, monitor     PSA  3/4/2024 7.9  9/8/2023's 5.69  7/22/2022 3.6  4/1/2022 4.47

## 2024-08-05 ENCOUNTER — HOSPITAL ENCOUNTER (OUTPATIENT)
Dept: RADIOLOGY | Facility: HOSPITAL | Age: 64
Discharge: HOME | End: 2024-08-05
Payer: COMMERCIAL

## 2024-08-05 DIAGNOSIS — R16.0 LIVER MASS: ICD-10-CM

## 2024-08-05 PROCEDURE — 2550000001 HC RX 255 CONTRASTS

## 2024-08-05 PROCEDURE — 74174 CTA ABD&PLVS W/CONTRAST: CPT

## 2024-08-05 PROCEDURE — 74174 CTA ABD&PLVS W/CONTRAST: CPT | Performed by: RADIOLOGY

## 2024-08-15 ENCOUNTER — APPOINTMENT (OUTPATIENT)
Dept: PRIMARY CARE | Facility: CLINIC | Age: 64
End: 2024-08-15
Payer: COMMERCIAL

## 2024-08-15 VITALS
HEART RATE: 68 BPM | WEIGHT: 235 LBS | BODY MASS INDEX: 34.8 KG/M2 | SYSTOLIC BLOOD PRESSURE: 130 MMHG | HEIGHT: 69 IN | DIASTOLIC BLOOD PRESSURE: 80 MMHG

## 2024-08-15 DIAGNOSIS — E11.51 DIABETES MELLITUS TYPE 2 WITH PERIPHERAL ARTERY DISEASE (MULTI): Primary | ICD-10-CM

## 2024-08-15 DIAGNOSIS — N40.0 BPH WITH ELEVATED PSA: ICD-10-CM

## 2024-08-15 DIAGNOSIS — E11.65 TYPE 2 DIABETES MELLITUS WITH HYPERGLYCEMIA, WITHOUT LONG-TERM CURRENT USE OF INSULIN (MULTI): ICD-10-CM

## 2024-08-15 DIAGNOSIS — F17.200 CURRENT SMOKER: ICD-10-CM

## 2024-08-15 DIAGNOSIS — R97.20 BPH WITH ELEVATED PSA: ICD-10-CM

## 2024-08-15 DIAGNOSIS — M54.12 ACUTE CERVICAL RADICULOPATHY: ICD-10-CM

## 2024-08-15 DIAGNOSIS — E78.5 DYSLIPIDEMIA ASSOCIATED WITH TYPE 2 DIABETES MELLITUS (MULTI): ICD-10-CM

## 2024-08-15 DIAGNOSIS — I11.0 HYPERTENSIVE HEART DISEASE WITH CHRONIC DIASTOLIC CONGESTIVE HEART FAILURE (MULTI): ICD-10-CM

## 2024-08-15 DIAGNOSIS — F10.20 UNCOMPLICATED ALCOHOL DEPENDENCE (MULTI): ICD-10-CM

## 2024-08-15 DIAGNOSIS — B18.2 CHRONIC HEPATITIS C WITHOUT HEPATIC COMA (MULTI): ICD-10-CM

## 2024-08-15 DIAGNOSIS — E11.69 DYSLIPIDEMIA ASSOCIATED WITH TYPE 2 DIABETES MELLITUS (MULTI): ICD-10-CM

## 2024-08-15 DIAGNOSIS — I50.32 HYPERTENSIVE HEART DISEASE WITH CHRONIC DIASTOLIC CONGESTIVE HEART FAILURE (MULTI): ICD-10-CM

## 2024-08-15 PROBLEM — Z23 NEED FOR HEPATITIS A AND B VACCINATION: Status: RESOLVED | Noted: 2024-05-14 | Resolved: 2024-08-15

## 2024-08-15 PROCEDURE — 4010F ACE/ARB THERAPY RXD/TAKEN: CPT | Performed by: INTERNAL MEDICINE

## 2024-08-15 PROCEDURE — 3060F POS MICROALBUMINURIA REV: CPT | Performed by: INTERNAL MEDICINE

## 2024-08-15 PROCEDURE — 1160F RVW MEDS BY RX/DR IN RCRD: CPT | Performed by: INTERNAL MEDICINE

## 2024-08-15 PROCEDURE — 3075F SYST BP GE 130 - 139MM HG: CPT | Performed by: INTERNAL MEDICINE

## 2024-08-15 PROCEDURE — 1123F ACP DISCUSS/DSCN MKR DOCD: CPT | Performed by: INTERNAL MEDICINE

## 2024-08-15 PROCEDURE — 3008F BODY MASS INDEX DOCD: CPT | Performed by: INTERNAL MEDICINE

## 2024-08-15 PROCEDURE — 99214 OFFICE O/P EST MOD 30 MIN: CPT | Performed by: INTERNAL MEDICINE

## 2024-08-15 PROCEDURE — 1159F MED LIST DOCD IN RCRD: CPT | Performed by: INTERNAL MEDICINE

## 2024-08-15 PROCEDURE — 3048F LDL-C <100 MG/DL: CPT | Performed by: INTERNAL MEDICINE

## 2024-08-15 PROCEDURE — 1158F ADVNC CARE PLAN TLK DOCD: CPT | Performed by: INTERNAL MEDICINE

## 2024-08-15 PROCEDURE — 3044F HG A1C LEVEL LT 7.0%: CPT | Performed by: INTERNAL MEDICINE

## 2024-08-15 PROCEDURE — 99406 BEHAV CHNG SMOKING 3-10 MIN: CPT | Performed by: INTERNAL MEDICINE

## 2024-08-15 PROCEDURE — 3079F DIAST BP 80-89 MM HG: CPT | Performed by: INTERNAL MEDICINE

## 2024-08-15 RX ORDER — METHYLPREDNISOLONE 4 MG/1
TABLET ORAL
Qty: 21 TABLET | Refills: 0 | Status: SHIPPED | OUTPATIENT
Start: 2024-08-15 | End: 2024-08-22

## 2024-08-15 RX ORDER — CYCLOBENZAPRINE HCL 10 MG
10 TABLET ORAL 3 TIMES DAILY PRN
Qty: 30 TABLET | Refills: 0 | Status: SHIPPED | OUTPATIENT
Start: 2024-08-15 | End: 2024-10-14

## 2024-08-15 ASSESSMENT — ENCOUNTER SYMPTOMS: NECK PAIN: 1

## 2024-08-15 NOTE — PROGRESS NOTES
"Subjective   Patient ID: Hemant Ness is a 63 y.o. male who presents for Follow-up, Neck Pain, and Shoulder Pain.    HPI     Review of Systems    Objective   /80   Pulse 68   Ht 1.753 m (5' 9\")   Wt 107 kg (235 lb)   BMI 34.70 kg/m²     Physical Exam    Assessment/Plan          "

## 2024-08-15 NOTE — ASSESSMENT & PLAN NOTE
Patient given short course of Medrol Dosepak for acute cervical radiculopathy affecting left neck and arm.  Will trial low-dose muscle relaxer as well and a traction device at home to use reevaluate next visit in 3 months

## 2024-08-15 NOTE — ASSESSMENT & PLAN NOTE
Reevaluate hemoglobin A1c with Trulicity 4.5 mg subcu weekly with empagliflozin 25 mg daily with A1c goal less than 7 optimized for reduction in cardiovascular events

## 2024-08-15 NOTE — PROGRESS NOTES
"Subjective   Reason for Visit: Hemant Ness is an 63 y.o. male here for a fu visit.     Past Medical, Surgical, and Family History reviewed and updated in chart.    Reviewed all medications by prescribing practitioner or clinical pharmacist (such as prescriptions, OTCs, herbal therapies and supplements) and documented in the medical record.    Neck Pain   This is a new problem. The current episode started 1 to 4 weeks ago. The problem occurs constantly. The problem has been gradually worsening. The pain is associated with lifting a heavy object and a sleep position. The pain is present in the left side. The quality of the pain is described as aching and shooting. The pain is at a severity of 8/10. The pain is moderate. The symptoms are aggravated by bending, position, stress and twisting. The pain is Same all the time. Stiffness is present At night, all day and in the morning. He has tried neck support, heat and bed rest for the symptoms. The treatment provided mild relief.   I spent more than 3 minutes but less than 10 minutes counseling patient about need for smoking/tobacco cessation and how I can get support efforts when patient is ready to quit.  Discussed nicotine replacement therapy such as bupropion nicotine replacement therapy and Chantix .  Hypnosis,support groups,and acupuncture are other potential options.    Patient Care Team:  Jose Juan Mukherjee DO as PCP - General  Jose Juan Mukherjee DO as PCP - Hailee WALLACE PCP     Review of Systems   Musculoskeletal:  Positive for neck pain.       Objective   Vitals:  /80   Pulse 68   Ht 1.753 m (5' 9\")   Wt 107 kg (235 lb)   BMI 34.70 kg/m²       Physical Exam    Assessment/Plan   Problem List Items Addressed This Visit             ICD-10-CM    Type 2 diabetes mellitus with hyperglycemia, without long-term current use of insulin (Multi) E11.65     Reevaluate hemoglobin A1c with Trulicity 4.5 mg subcu weekly with empagliflozin 25 mg daily with A1c goal " less than 7 optimized for reduction in cardiovascular events         BPH with elevated PSA N40.0, R97.20     Elevated PSA of 7.9 follows with urologist repeat PSA in 6 months with close observation         Current smoker F17.200     Currently continues to smoke 1 pack of cigarettes a day advise reduction to reduce cardiovascular events and complications related to malignancy         Dyslipidemia associated with type 2 diabetes mellitus (Multi) E11.69, E78.5     Reevaluate lipid profile on rosuvastatin 10 mg daily LDL goal less than 70         Relevant Orders    Comprehensive Metabolic Panel    Hemoglobin A1C    Lipid Panel    Albumin-Creatinine Ratio, Urine Random    Follow Up In [facility] - PCP - Established    Hypertensive heart disease with chronic diastolic congestive heart failure (Multi) I11.0, I50.32     Well compensated at this time with optimal blood pressure control on metoprolol succinate  mg daily and losartan 100 mg daily with empagliflozin 25 mg daily reevaluate in 3 months         Uncomplicated alcohol dependence (Multi) F10.20     Very continue with alcohol abstinence in the setting of treatment of cirrhosis and hepatoma         Diabetes mellitus type 2 with peripheral artery disease (Multi) - Primary E11.51     Continue gabapentin 100 mg nightly         Relevant Orders    Comprehensive Metabolic Panel    Hemoglobin A1C    Lipid Panel    Albumin-Creatinine Ratio, Urine Random    Follow Up In [facility] - PCP - Established    Chronic hepatitis C without hepatic coma (Multi) B18.2     Completed medical therapy with optimal results and optimization with vaccination for hepatitis A and B         Acute cervical radiculopathy M54.12     Patient given short course of Medrol Dosepak for acute cervical radiculopathy affecting left neck and arm.  Will trial low-dose muscle relaxer as well and a traction device at home to use reevaluate next visit in 3 months         Relevant  Medications    methylPREDNISolone (Medrol Dospak) 4 mg tablets    cyclobenzaprine (Flexeril) 10 mg tablet

## 2024-08-15 NOTE — ASSESSMENT & PLAN NOTE
Completed medical therapy with optimal results and optimization with vaccination for hepatitis A and B

## 2024-08-15 NOTE — ASSESSMENT & PLAN NOTE
Patient with hepatoma to start medical therapy with hepatologist and infusion therapy no evidence of decompensated cirrhosis at this time continue to strongly advise smoking and alcohol cessation

## 2024-08-15 NOTE — ASSESSMENT & PLAN NOTE
Currently continues to smoke 1 pack of cigarettes a day advise reduction to reduce cardiovascular events and complications related to malignancy

## 2024-08-15 NOTE — ASSESSMENT & PLAN NOTE
Well compensated at this time with optimal blood pressure control on metoprolol succinate  mg daily and losartan 100 mg daily with empagliflozin 25 mg daily reevaluate in 3 months

## 2024-08-28 ENCOUNTER — PREP FOR PROCEDURE (OUTPATIENT)
Dept: RADIOLOGY | Facility: HOSPITAL | Age: 64
End: 2024-08-28
Payer: COMMERCIAL

## 2024-08-29 PROCEDURE — RXMED WILLOW AMBULATORY MEDICATION CHARGE

## 2024-08-30 ENCOUNTER — PHARMACY VISIT (OUTPATIENT)
Dept: PHARMACY | Facility: CLINIC | Age: 64
End: 2024-08-30
Payer: COMMERCIAL

## 2024-09-04 DIAGNOSIS — I10 HYPERTENSION, BENIGN: ICD-10-CM

## 2024-09-04 RX ORDER — LOSARTAN POTASSIUM 100 MG/1
100 TABLET ORAL DAILY
Qty: 90 TABLET | Refills: 0 | Status: SHIPPED | OUTPATIENT
Start: 2024-09-04

## 2024-09-09 ENCOUNTER — HOSPITAL ENCOUNTER (OUTPATIENT)
Dept: RADIOLOGY | Facility: HOSPITAL | Age: 64
Discharge: HOME | End: 2024-09-09
Payer: COMMERCIAL

## 2024-09-09 VITALS
OXYGEN SATURATION: 94 % | RESPIRATION RATE: 15 BRPM | TEMPERATURE: 97.9 F | HEART RATE: 81 BPM | DIASTOLIC BLOOD PRESSURE: 91 MMHG | SYSTOLIC BLOOD PRESSURE: 159 MMHG

## 2024-09-09 DIAGNOSIS — C22.0 HCC (HEPATOCELLULAR CARCINOMA) (MULTI): ICD-10-CM

## 2024-09-09 DIAGNOSIS — R79.1 COAGULATION TEST ABNORMALITY: ICD-10-CM

## 2024-09-09 DIAGNOSIS — R79.1 COAGULATION TEST ABNORMALITY: Primary | ICD-10-CM

## 2024-09-09 LAB
ALBUMIN SERPL BCP-MCNC: 4.6 G/DL (ref 3.4–5)
ALP SERPL-CCNC: 69 U/L (ref 33–136)
ALT SERPL W P-5'-P-CCNC: 28 U/L (ref 10–52)
ANION GAP SERPL CALC-SCNC: 13 MMOL/L (ref 10–20)
APTT PPP: 28 SECONDS (ref 27–38)
AST SERPL W P-5'-P-CCNC: 22 U/L (ref 9–39)
BILIRUB DIRECT SERPL-MCNC: 0.2 MG/DL (ref 0–0.3)
BILIRUB SERPL-MCNC: 0.7 MG/DL (ref 0–1.2)
BUN SERPL-MCNC: 16 MG/DL (ref 6–23)
CALCIUM SERPL-MCNC: 9.9 MG/DL (ref 8.6–10.6)
CHLORIDE SERPL-SCNC: 107 MMOL/L (ref 98–107)
CO2 SERPL-SCNC: 24 MMOL/L (ref 21–32)
CREAT SERPL-MCNC: 1.09 MG/DL (ref 0.5–1.3)
EGFRCR SERPLBLD CKD-EPI 2021: 75 ML/MIN/1.73M*2
ERYTHROCYTE [DISTWIDTH] IN BLOOD BY AUTOMATED COUNT: 12.7 % (ref 11.5–14.5)
GLUCOSE SERPL-MCNC: 101 MG/DL (ref 74–99)
HCT VFR BLD AUTO: 50.3 % (ref 41–52)
HGB BLD-MCNC: 17 G/DL (ref 13.5–17.5)
INR PPP: 0.9 (ref 0.9–1.1)
MCH RBC QN AUTO: 35.9 PG (ref 26–34)
MCHC RBC AUTO-ENTMCNC: 33.8 G/DL (ref 32–36)
MCV RBC AUTO: 106 FL (ref 80–100)
NRBC BLD-RTO: 0 /100 WBCS (ref 0–0)
PLATELET # BLD AUTO: 168 X10*3/UL (ref 150–450)
POTASSIUM SERPL-SCNC: 4.4 MMOL/L (ref 3.5–5.3)
PROT SERPL-MCNC: 7.2 G/DL (ref 6.4–8.2)
PROTHROMBIN TIME: 10.2 SECONDS (ref 9.8–12.8)
RBC # BLD AUTO: 4.74 X10*6/UL (ref 4.5–5.9)
SODIUM SERPL-SCNC: 140 MMOL/L (ref 136–145)
WBC # BLD AUTO: 8.2 X10*3/UL (ref 4.4–11.3)

## 2024-09-09 PROCEDURE — 2500000004 HC RX 250 GENERAL PHARMACY W/ HCPCS (ALT 636 FOR OP/ED): Performed by: RADIOLOGY

## 2024-09-09 PROCEDURE — 7100000009 HC PHASE TWO TIME - INITIAL BASE CHARGE

## 2024-09-09 PROCEDURE — 7100000010 HC PHASE TWO TIME - EACH INCREMENTAL 1 MINUTE

## 2024-09-09 PROCEDURE — 75774 ARTERY X-RAY EACH VESSEL: CPT | Performed by: RADIOLOGY

## 2024-09-09 PROCEDURE — 36245 INS CATH ABD/L-EXT ART 1ST: CPT | Performed by: RADIOLOGY

## 2024-09-09 PROCEDURE — 77300 RADIATION THERAPY DOSE PLAN: CPT

## 2024-09-09 PROCEDURE — 82248 BILIRUBIN DIRECT: CPT

## 2024-09-09 PROCEDURE — 3430000001 HC RX 343 DIAGNOSTIC RADIOPHARMACEUTICALS

## 2024-09-09 PROCEDURE — 75710 ARTERY X-RAYS ARM/LEG: CPT | Mod: RT | Performed by: RADIOLOGY

## 2024-09-09 PROCEDURE — 96373 THER/PROPH/DIAG INJ IA: CPT | Performed by: RADIOLOGY

## 2024-09-09 PROCEDURE — 37243 VASC EMBOLIZE/OCCLUDE ORGAN: CPT | Performed by: RADIOLOGY

## 2024-09-09 PROCEDURE — C1760 CLOSURE DEV, VASC: HCPCS

## 2024-09-09 PROCEDURE — 99152 MOD SED SAME PHYS/QHP 5/>YRS: CPT | Performed by: RADIOLOGY

## 2024-09-09 PROCEDURE — 36248 INS CATH ABD/L-EXT ART ADDL: CPT | Performed by: RADIOLOGY

## 2024-09-09 PROCEDURE — 36415 COLL VENOUS BLD VENIPUNCTURE: CPT

## 2024-09-09 PROCEDURE — C1887 CATHETER, GUIDING: HCPCS

## 2024-09-09 PROCEDURE — 2720000007 HC OR 272 NO HCPCS

## 2024-09-09 PROCEDURE — 75726 ARTERY X-RAYS ABDOMEN: CPT | Performed by: RADIOLOGY

## 2024-09-09 PROCEDURE — 99153 MOD SED SAME PHYS/QHP EA: CPT

## 2024-09-09 PROCEDURE — 36246 INS CATH ABD/L-EXT ART 2ND: CPT | Performed by: RADIOLOGY

## 2024-09-09 PROCEDURE — 76937 US GUIDE VASCULAR ACCESS: CPT | Performed by: RADIOLOGY

## 2024-09-09 PROCEDURE — 85610 PROTHROMBIN TIME: CPT

## 2024-09-09 PROCEDURE — 85027 COMPLETE CBC AUTOMATED: CPT

## 2024-09-09 PROCEDURE — 2550000001 HC RX 255 CONTRASTS: Performed by: RADIOLOGY

## 2024-09-09 PROCEDURE — 99152 MOD SED SAME PHYS/QHP 5/>YRS: CPT

## 2024-09-09 PROCEDURE — 99153 MOD SED SAME PHYS/QHP EA: CPT | Performed by: RADIOLOGY

## 2024-09-09 PROCEDURE — A9540 TC99M MAA: HCPCS

## 2024-09-09 PROCEDURE — 36247 INS CATH ABD/L-EXT ART 3RD: CPT | Performed by: RADIOLOGY

## 2024-09-09 PROCEDURE — 2780000003 HC OR 278 NO HCPCS

## 2024-09-09 PROCEDURE — 78215 LVR&SPLEEN IMG STATIC ONLY: CPT

## 2024-09-09 PROCEDURE — 80053 COMPREHEN METABOLIC PANEL: CPT | Performed by: NURSE PRACTITIONER

## 2024-09-09 PROCEDURE — C1769 GUIDE WIRE: HCPCS

## 2024-09-09 PROCEDURE — 96373 THER/PROPH/DIAG INJ IA: CPT

## 2024-09-09 PROCEDURE — 76377 3D RENDER W/INTRP POSTPROCES: CPT | Performed by: RADIOLOGY

## 2024-09-09 RX ORDER — MIDAZOLAM HYDROCHLORIDE 1 MG/ML
INJECTION INTRAMUSCULAR; INTRAVENOUS
Status: COMPLETED | OUTPATIENT
Start: 2024-09-09 | End: 2024-09-09

## 2024-09-09 RX ORDER — FENTANYL CITRATE 50 UG/ML
INJECTION, SOLUTION INTRAMUSCULAR; INTRAVENOUS
Status: COMPLETED | OUTPATIENT
Start: 2024-09-09 | End: 2024-09-09

## 2024-09-09 ASSESSMENT — PAIN - FUNCTIONAL ASSESSMENT

## 2024-09-09 ASSESSMENT — PAIN SCALES - GENERAL

## 2024-09-09 ASSESSMENT — ENCOUNTER SYMPTOMS
HEADACHES: 0
DIZZINESS: 0
SHORTNESS OF BREATH: 0
CHILLS: 0
FEVER: 0
ABDOMINAL DISTENTION: 0

## 2024-09-09 NOTE — POST-PROCEDURE NOTE
Interventional Radiology Brief Postprocedure Note    Attending: Dr. Jose Juan Mason MD    Assistant: David Leyva DO, PGY-3    Diagnosis: Hepatocellular Carcioma    Description of procedure: Technically successful MAA / mapping for Y90 planning for hypervascular hepatic mass. Please see dedicated procedure note in PACS for further details.      Anesthesia:  Local /fentanyl /versed    Complications: None    Estimated Blood Loss: none    Medications (Filter: Administrations occurring from 0939 to 1132 on 09/09/24) As of 09/09/24 1132      fentaNYL PF (Sublimaze) injection (mcg) Total dose:  100 mcg      Date/Time Rate/Dose/Volume Action       09/09/24  1022 50 mcg Given      1114 50 mcg Given               midazolam (Versed) injection (mg) Total dose:  3.5 mg      Date/Time Rate/Dose/Volume Action       09/09/24  1022 1 mg Given      1033 1 mg Given      1114 1 mg Given      1123 0.5 mg Given               iohexol (OMNIPaque) 350 mg iodine/mL solution 200 mL (mL) Total volume:  100 mL      Date/Time Rate/Dose/Volume Action       09/09/24  1041 100 mL Given                   No specimens collected      See detailed result report with images in PACS.    The patient tolerated the procedure well without incident or complication and is in stable condition.

## 2024-09-09 NOTE — DISCHARGE INSTRUCTIONS
You received moderate sedation:  - Do not drive a car, or operate any machinery or power tools of any kind.  - Do not drink any alcoholic drinks.  - Do not take any over the counter medications that may cause drowsiness.  - Do not make any important decisions or sign any legal documents.  - You need to have a responsible adult accompany you home.  - You may resume your normal diet.  - We strongly suggest that a responsible adult be with you for the rest of the day and also during the night. This is for your protection and safety.     For questions related to your procedure:  Please call [phone] between the hours of 7:00am-5:00pm Monday through Friday.  Please call [phone] after 5:00pm and on weekends and holidays.     In the event of an emergency call 911 or go to your nearest emergency room.

## 2024-09-09 NOTE — Clinical Note
MAA performed. VSS. 3.5mg of versed and 100mcg of fentanyl. Pt to be flat for 3 hours. Angio seal placed.

## 2024-09-09 NOTE — PRE-PROCEDURE NOTE
Interventional Radiology Preprocedure Note    Indication for procedure: The encounter diagnosis was HCC (hepatocellular carcinoma) (Multi).    Relevant review of systems: Review of Systems   Constitutional:  Negative for chills and fever.   Respiratory:  Negative for shortness of breath.    Cardiovascular:  Negative for chest pain.   Gastrointestinal:  Negative for abdominal distention.   Skin: Negative.    Neurological:  Negative for dizziness, syncope and headaches.        Relevant Labs:   Lab Results   Component Value Date    CREATININE 1.07 07/31/2024    EGFR 77 07/31/2024    INR 0.9 07/31/2024    PROTIME 10.3 07/31/2024       Planned Sedation/Anesthesia: Moderate    Airway assessment: normal    Directed physical examination:    Physical Exam  Constitutional:       General: He is not in acute distress.  Cardiovascular:      Rate and Rhythm: Normal rate.      Pulses: Normal pulses.   Pulmonary:      Effort: Pulmonary effort is normal.   Skin:     General: Skin is warm and dry.   Neurological:      Mental Status: He is oriented to person, place, and time. Mental status is at baseline.          Mallampati: III (soft and hard palate and base of uvula visible)    ASA Score: ASA 2 - Patient with mild systemic disease with no functional limitations    Benefits, risks and alternatives of procedure and planned sedation have been discussed with the patient and/or their representative. All questions answered and they agree to proceed.

## 2024-09-16 ENCOUNTER — HOSPITAL ENCOUNTER (OUTPATIENT)
Dept: RADIOLOGY | Facility: HOSPITAL | Age: 64
Discharge: HOME | End: 2024-09-16
Payer: COMMERCIAL

## 2024-09-16 VITALS
SYSTOLIC BLOOD PRESSURE: 145 MMHG | OXYGEN SATURATION: 98 % | TEMPERATURE: 97.9 F | HEART RATE: 72 BPM | RESPIRATION RATE: 17 BRPM | DIASTOLIC BLOOD PRESSURE: 89 MMHG

## 2024-09-16 DIAGNOSIS — C22.0 HCC (HEPATOCELLULAR CARCINOMA) (MULTI): ICD-10-CM

## 2024-09-16 DIAGNOSIS — C22.0 HCC (HEPATOCELLULAR CARCINOMA) (MULTI): Primary | ICD-10-CM

## 2024-09-16 PROCEDURE — 78830 RP LOCLZJ TUM SPECT W/CT 1: CPT

## 2024-09-16 PROCEDURE — 75726 ARTERY X-RAYS ABDOMEN: CPT | Mod: 59 | Performed by: RADIOLOGY

## 2024-09-16 PROCEDURE — 99153 MOD SED SAME PHYS/QHP EA: CPT

## 2024-09-16 PROCEDURE — 96373 THER/PROPH/DIAG INJ IA: CPT | Mod: 59 | Performed by: RADIOLOGY

## 2024-09-16 PROCEDURE — 75710 ARTERY X-RAYS ARM/LEG: CPT | Mod: RT | Performed by: RADIOLOGY

## 2024-09-16 PROCEDURE — 77001 FLUOROGUIDE FOR VEIN DEVICE: CPT | Mod: 59 | Performed by: RADIOLOGY

## 2024-09-16 PROCEDURE — 75894 X-RAYS TRANSCATH THERAPY: CPT | Mod: 59 | Performed by: RADIOLOGY

## 2024-09-16 PROCEDURE — 7100000010 HC PHASE TWO TIME - EACH INCREMENTAL 1 MINUTE

## 2024-09-16 PROCEDURE — 2500000004 HC RX 250 GENERAL PHARMACY W/ HCPCS (ALT 636 FOR OP/ED): Performed by: RADIOLOGY

## 2024-09-16 PROCEDURE — 36247 INS CATH ABD/L-EXT ART 3RD: CPT

## 2024-09-16 PROCEDURE — 96373 THER/PROPH/DIAG INJ IA: CPT | Mod: 59

## 2024-09-16 PROCEDURE — 99152 MOD SED SAME PHYS/QHP 5/>YRS: CPT

## 2024-09-16 PROCEDURE — 76937 US GUIDE VASCULAR ACCESS: CPT | Performed by: RADIOLOGY

## 2024-09-16 PROCEDURE — 36247 INS CATH ABD/L-EXT ART 3RD: CPT | Performed by: RADIOLOGY

## 2024-09-16 PROCEDURE — 75726 ARTERY X-RAYS ABDOMEN: CPT

## 2024-09-16 PROCEDURE — 2720000007 HC OR 272 NO HCPCS

## 2024-09-16 PROCEDURE — 3430000001 HC RX 343 DIAGNOSTIC RADIOPHARMACEUTICALS: Mod: 59

## 2024-09-16 PROCEDURE — 79440 NUCLEAR RX INTRA-ARTICULAR: CPT

## 2024-09-16 PROCEDURE — C1769 GUIDE WIRE: HCPCS

## 2024-09-16 PROCEDURE — 99152 MOD SED SAME PHYS/QHP 5/>YRS: CPT | Performed by: RADIOLOGY

## 2024-09-16 PROCEDURE — 7100000009 HC PHASE TWO TIME - INITIAL BASE CHARGE

## 2024-09-16 PROCEDURE — 2550000001 HC RX 255 CONTRASTS: Performed by: RADIOLOGY

## 2024-09-16 PROCEDURE — 77300 RADIATION THERAPY DOSE PLAN: CPT

## 2024-09-16 PROCEDURE — C1887 CATHETER, GUIDING: HCPCS

## 2024-09-16 PROCEDURE — C2616 BRACHYTX, NON-STR,YTTRIUM-90: HCPCS | Mod: 59

## 2024-09-16 PROCEDURE — 75710 ARTERY X-RAYS ARM/LEG: CPT | Mod: RT

## 2024-09-16 PROCEDURE — C1760 CLOSURE DEV, VASC: HCPCS

## 2024-09-16 PROCEDURE — 77001 FLUOROGUIDE FOR VEIN DEVICE: CPT

## 2024-09-16 PROCEDURE — 37243 VASC EMBOLIZE/OCCLUDE ORGAN: CPT

## 2024-09-16 PROCEDURE — 2780000003 HC OR 278 NO HCPCS

## 2024-09-16 PROCEDURE — 76937 US GUIDE VASCULAR ACCESS: CPT

## 2024-09-16 PROCEDURE — 75894 X-RAYS TRANSCATH THERAPY: CPT

## 2024-09-16 PROCEDURE — 99153 MOD SED SAME PHYS/QHP EA: CPT | Performed by: RADIOLOGY

## 2024-09-16 RX ORDER — MIDAZOLAM HYDROCHLORIDE 1 MG/ML
INJECTION INTRAMUSCULAR; INTRAVENOUS
Status: COMPLETED | OUTPATIENT
Start: 2024-09-16 | End: 2024-09-16

## 2024-09-16 RX ORDER — FENTANYL CITRATE 50 UG/ML
INJECTION, SOLUTION INTRAMUSCULAR; INTRAVENOUS
Status: COMPLETED | OUTPATIENT
Start: 2024-09-16 | End: 2024-09-16

## 2024-09-16 ASSESSMENT — PAIN SCALES - GENERAL
PAINLEVEL_OUTOF10: 8
PAINLEVEL_OUTOF10: 0 - NO PAIN
PAINLEVEL_OUTOF10: 4
PAINLEVEL_OUTOF10: 0 - NO PAIN
PAINLEVEL_OUTOF10: 4
PAINLEVEL_OUTOF10: 0 - NO PAIN
PAINLEVEL_OUTOF10: 7
PAINLEVEL_OUTOF10: 0 - NO PAIN
PAINLEVEL_OUTOF10: 8
PAINLEVEL_OUTOF10: 0 - NO PAIN
PAINLEVEL_OUTOF10: 4
PAINLEVEL_OUTOF10: 4

## 2024-09-16 ASSESSMENT — PAIN - FUNCTIONAL ASSESSMENT
PAIN_FUNCTIONAL_ASSESSMENT: 0-10

## 2024-09-16 NOTE — Clinical Note
Y-90 procedure performed. Access via R femoral artery. R femoral closure with 6fr angioseal. Hemostasis achieved. No visible bleeding or hematoma at R groin site.

## 2024-09-16 NOTE — POST-PROCEDURE NOTE
Interventional Radiology Brief Postprocedure Note    Attending: Dr Jose Juan Mason    Assistant: Dr Erica Spicer    Diagnosis: Y90 treatment for liver segment 8 mass    Description of procedure: Successful and uneventful Y90 treatment for a liver segment 8 mass.     Anesthesia:  MAC    Complications: None    Estimated Blood Loss: minimal    Medications (Filter: Administrations occurring from 1031 to 1207 on 09/16/24) As of 09/16/24 1207      fentaNYL PF (Sublimaze) injection (mcg) Total dose:  250 mcg      Date/Time Rate/Dose/Volume Action       09/16/24  1040 50 mcg Given      1040 50 mcg Given      1105 50 mcg Given      1120 50 mcg Given      1143 50 mcg Given               midazolam (Versed) injection (mg) Total dose:  4 mg      Date/Time Rate/Dose/Volume Action       09/16/24  1040 1 mg Given      1105 1 mg Given      1120 1 mg Given      1143 1 mg Given               iohexol (OMNIPaque) 350 mg iodine/mL solution 100 mL (mL) Total volume:  100 mL      Date/Time Rate/Dose/Volume Action       09/16/24  1119 100 mL Given               iohexol (OMNIPaque) 350 mg iodine/mL solution 50 mL (mL) Total volume:  50 mL      Date/Time Rate/Dose/Volume Action       09/16/24  1120 50 mL Given                   No specimens collected      See detailed result report with images in PACS.    The patient tolerated the procedure well without incident or complication and is in stable condition.

## 2024-09-16 NOTE — DISCHARGE INSTRUCTIONS
You received moderate sedation:  - Do not drive a car, or operate any machinery or power tools of any kind.  - Do not drink any alcoholic drinks.  - Do not take any over the counter medications that may cause drowsiness.  - Do not make any important decisions or sign any legal documents.  - You need to have a responsible adult accompany you home.  - You may resume your normal diet.  - We strongly suggest that a responsible adult be with you for the rest of the day and also during the night. This is for your protection and safety.     For questions related to your procedure:  Please call [phone] between the hours of 7:00am-5:00pm Monday through Friday.  Please call [phone] after 5:00pm and on weekends and holidays.     In the event of an emergency call 911 or go to your nearest emergency room.

## 2024-09-16 NOTE — Clinical Note
Y-90 procedure performed. Access via R femoral artery. R femoral closure with 6fr angioseal. Hemostasis achieved. No visible bleeding or hematoma at R groin site. Gauze and tegaderm applied. Angioseal deployment @1200 and 3 hour ambulation @1500. Vascular flowsheet started @1200. Patient received a total of 4mg versed, 250mcg fentanyl IVP for sedation throughout case. Patient taken to Nuclear Medicine following procedure. VSS throughout  procedure.

## 2024-09-17 ENCOUNTER — APPOINTMENT (OUTPATIENT)
Dept: CARDIOLOGY | Facility: CLINIC | Age: 64
End: 2024-09-17
Payer: COMMERCIAL

## 2024-09-25 ENCOUNTER — APPOINTMENT (OUTPATIENT)
Dept: RADIOLOGY | Facility: CLINIC | Age: 64
End: 2024-09-25
Payer: COMMERCIAL

## 2024-09-26 PROCEDURE — RXMED WILLOW AMBULATORY MEDICATION CHARGE

## 2024-09-27 ENCOUNTER — PHARMACY VISIT (OUTPATIENT)
Dept: PHARMACY | Facility: CLINIC | Age: 64
End: 2024-09-27
Payer: COMMERCIAL

## 2024-10-10 DIAGNOSIS — R97.20 BPH WITH ELEVATED PSA: ICD-10-CM

## 2024-10-10 DIAGNOSIS — I21.02 ST ELEVATION MYOCARDIAL INFARCTION INVOLVING LEFT ANTERIOR DESCENDING (LAD) CORONARY ARTERY (MULTI): ICD-10-CM

## 2024-10-10 DIAGNOSIS — E11.65 TYPE 2 DIABETES MELLITUS WITH HYPERGLYCEMIA, WITHOUT LONG-TERM CURRENT USE OF INSULIN: ICD-10-CM

## 2024-10-10 DIAGNOSIS — N40.0 BPH WITH ELEVATED PSA: ICD-10-CM

## 2024-10-10 DIAGNOSIS — F17.200 CURRENT SMOKER: ICD-10-CM

## 2024-10-10 DIAGNOSIS — I10 HYPERTENSION, BENIGN: ICD-10-CM

## 2024-10-10 DIAGNOSIS — F10.20 UNCOMPLICATED ALCOHOL DEPENDENCE (MULTI): ICD-10-CM

## 2024-10-10 RX ORDER — METOPROLOL SUCCINATE 100 MG/1
TABLET, EXTENDED RELEASE ORAL
Qty: 90 TABLET | Refills: 0 | Status: SHIPPED | OUTPATIENT
Start: 2024-10-10

## 2024-10-14 ENCOUNTER — SPECIALTY PHARMACY (OUTPATIENT)
Dept: PHARMACY | Facility: CLINIC | Age: 64
End: 2024-10-14

## 2024-10-19 PROCEDURE — RXMED WILLOW AMBULATORY MEDICATION CHARGE

## 2024-10-21 ENCOUNTER — HOSPITAL ENCOUNTER (OUTPATIENT)
Dept: RADIOLOGY | Facility: HOSPITAL | Age: 64
Discharge: HOME | End: 2024-10-21
Payer: COMMERCIAL

## 2024-10-21 VITALS
DIASTOLIC BLOOD PRESSURE: 103 MMHG | RESPIRATION RATE: 20 BRPM | HEART RATE: 75 BPM | SYSTOLIC BLOOD PRESSURE: 161 MMHG | OXYGEN SATURATION: 96 % | TEMPERATURE: 97.5 F

## 2024-10-21 DIAGNOSIS — C22.0 HCC (HEPATOCELLULAR CARCINOMA) (MULTI): ICD-10-CM

## 2024-10-21 LAB
ALBUMIN SERPL BCP-MCNC: 4.3 G/DL (ref 3.4–5)
ALP SERPL-CCNC: 87 U/L (ref 33–136)
ALT SERPL W P-5'-P-CCNC: 24 U/L (ref 10–52)
ANION GAP SERPL CALC-SCNC: 14 MMOL/L (ref 10–20)
AST SERPL W P-5'-P-CCNC: 21 U/L (ref 9–39)
BILIRUB SERPL-MCNC: 0.4 MG/DL (ref 0–1.2)
BUN SERPL-MCNC: 20 MG/DL (ref 6–23)
CALCIUM SERPL-MCNC: 9.9 MG/DL (ref 8.6–10.6)
CHLORIDE SERPL-SCNC: 106 MMOL/L (ref 98–107)
CO2 SERPL-SCNC: 23 MMOL/L (ref 21–32)
CREAT SERPL-MCNC: 0.99 MG/DL (ref 0.5–1.3)
EGFRCR SERPLBLD CKD-EPI 2021: 84 ML/MIN/1.73M*2
ERYTHROCYTE [DISTWIDTH] IN BLOOD BY AUTOMATED COUNT: 12.2 % (ref 11.5–14.5)
GLUCOSE SERPL-MCNC: 120 MG/DL (ref 74–99)
HCT VFR BLD AUTO: 47.3 % (ref 41–52)
HGB BLD-MCNC: 16.2 G/DL (ref 13.5–17.5)
INR PPP: 1 (ref 0.9–1.1)
MCH RBC QN AUTO: 35.2 PG (ref 26–34)
MCHC RBC AUTO-ENTMCNC: 34.2 G/DL (ref 32–36)
MCV RBC AUTO: 103 FL (ref 80–100)
NRBC BLD-RTO: 0 /100 WBCS (ref 0–0)
PLATELET # BLD AUTO: 163 X10*3/UL (ref 150–450)
POTASSIUM SERPL-SCNC: 4.7 MMOL/L (ref 3.5–5.3)
PROT SERPL-MCNC: 7.2 G/DL (ref 6.4–8.2)
PROTHROMBIN TIME: 10.8 SECONDS (ref 9.8–12.8)
RBC # BLD AUTO: 4.6 X10*6/UL (ref 4.5–5.9)
SODIUM SERPL-SCNC: 138 MMOL/L (ref 136–145)
WBC # BLD AUTO: 6.8 X10*3/UL (ref 4.4–11.3)

## 2024-10-21 PROCEDURE — A9540 TC99M MAA: HCPCS | Performed by: STUDENT IN AN ORGANIZED HEALTH CARE EDUCATION/TRAINING PROGRAM

## 2024-10-21 PROCEDURE — 2550000001 HC RX 255 CONTRASTS: Performed by: RADIOLOGY

## 2024-10-21 PROCEDURE — 99152 MOD SED SAME PHYS/QHP 5/>YRS: CPT

## 2024-10-21 PROCEDURE — 77001 FLUOROGUIDE FOR VEIN DEVICE: CPT | Mod: 59 | Performed by: RADIOLOGY

## 2024-10-21 PROCEDURE — 75710 ARTERY X-RAYS ARM/LEG: CPT | Mod: RT | Performed by: RADIOLOGY

## 2024-10-21 PROCEDURE — 3430000001 HC RX 343 DIAGNOSTIC RADIOPHARMACEUTICALS: Performed by: STUDENT IN AN ORGANIZED HEALTH CARE EDUCATION/TRAINING PROGRAM

## 2024-10-21 PROCEDURE — 85027 COMPLETE CBC AUTOMATED: CPT | Performed by: STUDENT IN AN ORGANIZED HEALTH CARE EDUCATION/TRAINING PROGRAM

## 2024-10-21 PROCEDURE — 99153 MOD SED SAME PHYS/QHP EA: CPT

## 2024-10-21 PROCEDURE — 96373 THER/PROPH/DIAG INJ IA: CPT | Performed by: STUDENT IN AN ORGANIZED HEALTH CARE EDUCATION/TRAINING PROGRAM

## 2024-10-21 PROCEDURE — 99153 MOD SED SAME PHYS/QHP EA: CPT | Performed by: RADIOLOGY

## 2024-10-21 PROCEDURE — 36247 INS CATH ABD/L-EXT ART 3RD: CPT | Performed by: RADIOLOGY

## 2024-10-21 PROCEDURE — 76937 US GUIDE VASCULAR ACCESS: CPT | Performed by: RADIOLOGY

## 2024-10-21 PROCEDURE — 36010 PLACE CATHETER IN VEIN: CPT | Performed by: RADIOLOGY

## 2024-10-21 PROCEDURE — 76377 3D RENDER W/INTRP POSTPROCES: CPT | Mod: 59 | Performed by: RADIOLOGY

## 2024-10-21 PROCEDURE — 7100000009 HC PHASE TWO TIME - INITIAL BASE CHARGE

## 2024-10-21 PROCEDURE — 7100000010 HC PHASE TWO TIME - EACH INCREMENTAL 1 MINUTE

## 2024-10-21 PROCEDURE — C1760 CLOSURE DEV, VASC: HCPCS

## 2024-10-21 PROCEDURE — C1894 INTRO/SHEATH, NON-LASER: HCPCS

## 2024-10-21 PROCEDURE — 36415 COLL VENOUS BLD VENIPUNCTURE: CPT | Performed by: STUDENT IN AN ORGANIZED HEALTH CARE EDUCATION/TRAINING PROGRAM

## 2024-10-21 PROCEDURE — 82247 BILIRUBIN TOTAL: CPT | Performed by: STUDENT IN AN ORGANIZED HEALTH CARE EDUCATION/TRAINING PROGRAM

## 2024-10-21 PROCEDURE — 2780000003 HC OR 278 NO HCPCS

## 2024-10-21 PROCEDURE — C1769 GUIDE WIRE: HCPCS

## 2024-10-21 PROCEDURE — 85610 PROTHROMBIN TIME: CPT | Performed by: STUDENT IN AN ORGANIZED HEALTH CARE EDUCATION/TRAINING PROGRAM

## 2024-10-21 PROCEDURE — 2500000004 HC RX 250 GENERAL PHARMACY W/ HCPCS (ALT 636 FOR OP/ED): Performed by: RADIOLOGY

## 2024-10-21 PROCEDURE — 78185 SPLEEN IMAGING: CPT

## 2024-10-21 PROCEDURE — C1887 CATHETER, GUIDING: HCPCS

## 2024-10-21 PROCEDURE — 36247 INS CATH ABD/L-EXT ART 3RD: CPT

## 2024-10-21 PROCEDURE — 77300 RADIATION THERAPY DOSE PLAN: CPT

## 2024-10-21 PROCEDURE — 99152 MOD SED SAME PHYS/QHP 5/>YRS: CPT | Performed by: RADIOLOGY

## 2024-10-21 PROCEDURE — 2720000007 HC OR 272 NO HCPCS

## 2024-10-21 RX ORDER — FENTANYL CITRATE 50 UG/ML
INJECTION, SOLUTION INTRAMUSCULAR; INTRAVENOUS
Status: COMPLETED | OUTPATIENT
Start: 2024-10-21 | End: 2024-10-21

## 2024-10-21 RX ORDER — MIDAZOLAM HYDROCHLORIDE 1 MG/ML
INJECTION INTRAMUSCULAR; INTRAVENOUS
Status: COMPLETED | OUTPATIENT
Start: 2024-10-21 | End: 2024-10-21

## 2024-10-21 ASSESSMENT — PAIN SCALES - GENERAL

## 2024-10-21 ASSESSMENT — PAIN - FUNCTIONAL ASSESSMENT

## 2024-10-21 NOTE — POST-PROCEDURE NOTE
Interventional Radiology Post-Procedure Note    MAA mapping for Y-90 radioembolization    Procedure Details:  Technically successful and uncomplicated MAA mapping of the posterior branch of the right hepatic artery for subsequent Y-90 radioembolization.  Please see PACS for full procedural details.    Patient Tolerance: good  Complications: None    Indication for procedure: The encounter diagnosis was HCC (hepatocellular carcinoma) (Multi).    Pre-Procedure Verification and Time Out:  · Procedure Location procedure area   · HUDDLE - Pre-procedure Verification completed   · TIME OUT - Final Verification completed immediately prior to procedure start   · DEBRIEF completed     General Information:  Date/Time of Procedure: 10/21/24 at 12:04 PM  Indication(s): HCC  Findings: See PACS  Procedure performed by: Manuelito Wellington MD   Assistant(s): Dr. Jose Juan Mason MD  Estimated Blood Loss (mL):  15cc  Specimen: No  Informed Consent: written consent obtained    Prep:  Ultrasound Guided Insertion: Yes  Large Drape, Hand Hygiene, Surgical Cap, Surgical Mask, Sterile Gown, Sterile Gloves, Glasses, and Scrubs  Patient Position: Supine  Site Prep: chlorhexidine, draped, usual sterile procedure followed    Anesthesia/Medications:  Procedural Sedation:  Medications (Filter: Administrations occurring from 0940 to 1130 on 10/21/24) As of 10/21/24 1130      fentaNYL PF (Sublimaze) injection (mcg) Total dose:  300 mcg      Date/Time Rate/Dose/Volume Action       10/21/24  1003 50 mcg Given      1009 50 mcg Given      1028 50 mcg Given      1059 50 mcg Given      1125 100 mcg Given               midazolam (Versed) injection (mg) Total dose:  4 mg      Date/Time Rate/Dose/Volume Action       10/21/24  1004 1 mg Given      1009 1 mg Given      1028 1 mg Given      1058 1 mg Given               iohexol (OMNIPaque) 350 mg iodine/mL solution 100 mL (mL) Total volume:  200 mL      Date/Time Rate/Dose/Volume Action       10/21/24  1018 100 mL  Given      1019 100 mL Given                   Fentanyl: 300 mcg  Versed: 4 mg  1% Lidocaine: 12 mL    Attending Attestation:  I was present for the entire procedure    Manuelito Wellington MD, PGY-6  Interventional Radiology  IR pager: [phone]    NON-Urgent on call weekends and after hours weekdays (5pm - 5am) IR pager: [phone]  Urgent & emergent on call weekends and after hours weekdays (5pm-7am) IR pager: [phone]

## 2024-10-21 NOTE — PRE-PROCEDURE NOTE
INTERVENTIONAL RADIOLOGY PRE-PROCEDURE NOTE    Hemant Ness is a 64 y.o. male with PMHx of HCC who presents to the interventional radiology department for MAA mapping for Y-90 radioembolization.    Procedure: MAA mapping for Y-90 radioembolization    Indication for procedure: The encounter diagnosis was HCC (hepatocellular carcinoma) (Multi).    Past Medical History:   Diagnosis Date    Cirrhosis (Multi)     Coronary artery disease     Diabetes mellitus (Multi)     Hepatitis C     Hyperlipidemia, unspecified 12/17/2021    Dyslipidemia    Hypertension     Personal history of other endocrine, nutritional and metabolic disease 12/17/2021    History of diabetes mellitus    Personal history of other endocrine, nutritional and metabolic disease 12/17/2021    History of type 2 diabetes mellitus    Personal history of other endocrine, nutritional and metabolic disease 04/08/2022    History of type 2 diabetes mellitus    Personal history of other specified conditions 09/10/2021    History of chest pain    STEMI (ST elevation myocardial infarction) (Multi) 09/07/2023      Past Surgical History:   Procedure Laterality Date    OTHER SURGICAL HISTORY  07/23/2021    Cardiac catheterization with stent placement    OTHER SURGICAL HISTORY  07/29/2021    Cyst excision    OTHER SURGICAL HISTORY  07/29/2021    Cholecystectomy    OTHER SURGICAL HISTORY  07/29/2021    Shoulder surgery       Relevant Labs:   Lab Results   Component Value Date    CREATININE 1.09 09/09/2024    EGFR 75 09/09/2024    INR 0.9 09/09/2024    PROTIME 10.2 09/09/2024       Planned Sedation/Anesthesia: Moderate    Directed physical examination:    General: Normal appearance, behavior, cognition and NAD  Heart: Heart regular rate and rhythm  Lungs: No increased work of breathing  Abdomen: soft and nontender  Psych: oriented to time, place and person      Current Outpatient Medications:     aspirin 81 mg EC tablet, Take 1 tablet (81 mg) by mouth once daily.,  Disp: , Rfl:     cyclobenzaprine (Flexeril) 10 mg tablet, Take 1 tablet (10 mg) by mouth 3 times a day as needed for muscle spasms., Disp: 30 tablet, Rfl: 0    dulaglutide (TRULICITY) 4.5 mg/0.5 mL pen injector, INJECT ONE PEN (4.5 MG) UNDER THE SKIN ONCE WEEKLY, Disp: 2 mL, Rfl: 11    empagliflozin (Jardiance) 25 mg, TAKE 1 TABLET (25 MG) BY MOUTH ONCE DAILY., Disp: 90 tablet, Rfl: 3    gabapentin (Neurontin) 100 mg capsule, Take 1 capsule (100 mg) by mouth once daily at bedtime., Disp: 90 capsule, Rfl: 3    losartan (Cozaar) 100 mg tablet, TAKE ONE TABLET BY MOUTH ONCE EVERY DAY, Disp: 90 tablet, Rfl: 0    metoprolol succinate XL (Toprol-XL) 100 mg 24 hr tablet, TAKE ONE TABLET BY MOUTH once DAILY. do not crush or chew., Disp: 90 tablet, Rfl: 0    nitroglycerin (Nitrostat) 0.4 mg SL tablet, Place under the tongue., Disp: , Rfl:     OneTouch Delica Plus Lancet 30 gauge misc, , Disp: , Rfl:     OneTouch Ultra Test strip, , Disp: , Rfl:     OneTouch Ultra2 Meter misc, , Disp: , Rfl:     rosuvastatin (Crestor) 10 mg tablet, Take 1 tablet (10 mg) by mouth once daily., Disp: 100 tablet, Rfl: 3     Mallampati: III (soft and hard palate and base of uvula visible)    ASA Score: ASA 3 - Patient with moderate systemic disease with functional limitations    Benefits, risks and alternatives of procedure and planned sedation have been discussed with the patient and/or their representative. All questions answered and they agree to proceed.     Manuelito Wellington MD, PGY-6  Vascular & Interventional Radiology  IR pager: [phone]    NON-Urgent on call weekends and after hours weekdays (5pm - 5am) IR pager: [phone]  Urgent & emergent on call weekends and after hours weekdays (5pm-7am) IR pager: [phone]

## 2024-10-21 NOTE — DISCHARGE INSTRUCTIONS
You received moderate sedation:  - Do not drive a car, or operate any machinery or power tools of any kind for 24 hours.  - Do not drink any alcoholic drinks for 24 hours.  - Do not take any over the counter medications that may cause drowsiness for 24 hours.  - Do not make any important decisions or sign any legal documents for 24 hours.  - You need to have a responsible adult accompany you home.  - You may resume your normal diet.  - We strongly suggest that a responsible adult be with you for the rest of the day and also during the night. This is for your protection and safety.     For questions related to your procedure:  Please call [phone] between the hours of 7:00am-5:00pm Monday through Friday.  Please call [phone] after 5:00pm and on weekends and holidays.     In the event of an emergency call [phone] or go to your nearest emergency room.

## 2024-10-22 ENCOUNTER — PHARMACY VISIT (OUTPATIENT)
Dept: PHARMACY | Facility: CLINIC | Age: 64
End: 2024-10-22
Payer: COMMERCIAL

## 2024-10-22 DIAGNOSIS — B18.2 CHRONIC HEPATITIS C WITHOUT HEPATIC COMA (MULTI): ICD-10-CM

## 2024-10-29 ENCOUNTER — HOSPITAL ENCOUNTER (OUTPATIENT)
Dept: RADIOLOGY | Facility: HOSPITAL | Age: 64
Discharge: HOME | End: 2024-10-29
Payer: COMMERCIAL

## 2024-10-29 VITALS
RESPIRATION RATE: 17 BRPM | HEART RATE: 76 BPM | SYSTOLIC BLOOD PRESSURE: 116 MMHG | TEMPERATURE: 98.4 F | OXYGEN SATURATION: 97 % | DIASTOLIC BLOOD PRESSURE: 99 MMHG

## 2024-10-29 DIAGNOSIS — C22.0 HCC (HEPATOCELLULAR CARCINOMA) (MULTI): ICD-10-CM

## 2024-10-29 PROCEDURE — 99152 MOD SED SAME PHYS/QHP 5/>YRS: CPT | Performed by: RADIOLOGY

## 2024-10-29 PROCEDURE — 2550000001 HC RX 255 CONTRASTS: Performed by: RADIOLOGY

## 2024-10-29 PROCEDURE — 36247 INS CATH ABD/L-EXT ART 3RD: CPT | Performed by: RADIOLOGY

## 2024-10-29 PROCEDURE — 75726 ARTERY X-RAYS ABDOMEN: CPT | Mod: 59 | Performed by: RADIOLOGY

## 2024-10-29 PROCEDURE — 2720000007 HC OR 272 NO HCPCS

## 2024-10-29 PROCEDURE — 2780000003 HC OR 278 NO HCPCS

## 2024-10-29 PROCEDURE — 77300 RADIATION THERAPY DOSE PLAN: CPT

## 2024-10-29 PROCEDURE — 2500000004 HC RX 250 GENERAL PHARMACY W/ HCPCS (ALT 636 FOR OP/ED): Performed by: RADIOLOGY

## 2024-10-29 PROCEDURE — 7100000010 HC PHASE TWO TIME - EACH INCREMENTAL 1 MINUTE

## 2024-10-29 PROCEDURE — C1769 GUIDE WIRE: HCPCS

## 2024-10-29 PROCEDURE — 3430000001 HC RX 343 DIAGNOSTIC RADIOPHARMACEUTICALS: Performed by: STUDENT IN AN ORGANIZED HEALTH CARE EDUCATION/TRAINING PROGRAM

## 2024-10-29 PROCEDURE — 78830 RP LOCLZJ TUM SPECT W/CT 1: CPT

## 2024-10-29 PROCEDURE — C1894 INTRO/SHEATH, NON-LASER: HCPCS

## 2024-10-29 PROCEDURE — 75710 ARTERY X-RAYS ARM/LEG: CPT | Mod: RT | Performed by: RADIOLOGY

## 2024-10-29 PROCEDURE — C1760 CLOSURE DEV, VASC: HCPCS

## 2024-10-29 PROCEDURE — 7100000009 HC PHASE TWO TIME - INITIAL BASE CHARGE

## 2024-10-29 PROCEDURE — 96373 THER/PROPH/DIAG INJ IA: CPT | Performed by: STUDENT IN AN ORGANIZED HEALTH CARE EDUCATION/TRAINING PROGRAM

## 2024-10-29 PROCEDURE — 79440 NUCLEAR RX INTRA-ARTICULAR: CPT

## 2024-10-29 PROCEDURE — C2616 BRACHYTX, NON-STR,YTTRIUM-90: HCPCS | Performed by: STUDENT IN AN ORGANIZED HEALTH CARE EDUCATION/TRAINING PROGRAM

## 2024-10-29 PROCEDURE — 77001 FLUOROGUIDE FOR VEIN DEVICE: CPT | Mod: 59 | Performed by: RADIOLOGY

## 2024-10-29 PROCEDURE — 99153 MOD SED SAME PHYS/QHP EA: CPT | Performed by: RADIOLOGY

## 2024-10-29 PROCEDURE — 75894 X-RAYS TRANSCATH THERAPY: CPT | Mod: 59 | Performed by: RADIOLOGY

## 2024-10-29 PROCEDURE — 76937 US GUIDE VASCULAR ACCESS: CPT | Performed by: RADIOLOGY

## 2024-10-29 PROCEDURE — C1887 CATHETER, GUIDING: HCPCS

## 2024-10-29 PROCEDURE — 37243 VASC EMBOLIZE/OCCLUDE ORGAN: CPT

## 2024-10-29 PROCEDURE — 99153 MOD SED SAME PHYS/QHP EA: CPT | Mod: 59

## 2024-10-29 PROCEDURE — 99152 MOD SED SAME PHYS/QHP 5/>YRS: CPT | Mod: 59

## 2024-10-29 RX ORDER — MIDAZOLAM HYDROCHLORIDE 1 MG/ML
INJECTION INTRAMUSCULAR; INTRAVENOUS
Status: COMPLETED | OUTPATIENT
Start: 2024-10-29 | End: 2024-10-29

## 2024-10-29 RX ORDER — FENTANYL CITRATE 50 UG/ML
INJECTION, SOLUTION INTRAMUSCULAR; INTRAVENOUS
Status: COMPLETED | OUTPATIENT
Start: 2024-10-29 | End: 2024-10-29

## 2024-10-29 ASSESSMENT — PAIN - FUNCTIONAL ASSESSMENT

## 2024-10-29 ASSESSMENT — PAIN SCALES - GENERAL

## 2024-11-14 ENCOUNTER — TELEPHONE (OUTPATIENT)
Dept: GASTROENTEROLOGY | Facility: CLINIC | Age: 64
End: 2024-11-14
Payer: COMMERCIAL

## 2024-11-14 NOTE — TELEPHONE ENCOUNTER
Dr. Park ordered a EGD and Colonoscopy. Can you review the Chart to determine OR/ENDO and med holds? Thank you!

## 2024-11-15 PROCEDURE — RXMED WILLOW AMBULATORY MEDICATION CHARGE

## 2024-11-19 ENCOUNTER — APPOINTMENT (OUTPATIENT)
Dept: PRIMARY CARE | Facility: CLINIC | Age: 64
End: 2024-11-19
Payer: COMMERCIAL

## 2024-11-19 VITALS
HEIGHT: 69 IN | BODY MASS INDEX: 34.7 KG/M2 | OXYGEN SATURATION: 98 % | HEART RATE: 88 BPM | SYSTOLIC BLOOD PRESSURE: 130 MMHG | DIASTOLIC BLOOD PRESSURE: 80 MMHG

## 2024-11-19 DIAGNOSIS — I11.0 HYPERTENSIVE HEART DISEASE WITH CHRONIC DIASTOLIC CONGESTIVE HEART FAILURE: ICD-10-CM

## 2024-11-19 DIAGNOSIS — E11.51 DIABETES MELLITUS TYPE 2 WITH PERIPHERAL ARTERY DISEASE: ICD-10-CM

## 2024-11-19 DIAGNOSIS — K74.60 CIRRHOSIS OF LIVER WITHOUT ASCITES, UNSPECIFIED HEPATIC CIRRHOSIS TYPE (MULTI): Primary | ICD-10-CM

## 2024-11-19 DIAGNOSIS — E78.5 DYSLIPIDEMIA ASSOCIATED WITH TYPE 2 DIABETES MELLITUS (MULTI): ICD-10-CM

## 2024-11-19 DIAGNOSIS — B18.2 CHRONIC HEPATITIS C WITHOUT HEPATIC COMA (MULTI): ICD-10-CM

## 2024-11-19 DIAGNOSIS — I50.32 HYPERTENSIVE HEART DISEASE WITH CHRONIC DIASTOLIC CONGESTIVE HEART FAILURE: ICD-10-CM

## 2024-11-19 DIAGNOSIS — E11.69 DYSLIPIDEMIA ASSOCIATED WITH TYPE 2 DIABETES MELLITUS (MULTI): ICD-10-CM

## 2024-11-19 DIAGNOSIS — M54.12 ACUTE CERVICAL RADICULOPATHY: ICD-10-CM

## 2024-11-19 DIAGNOSIS — F17.200 CURRENT SMOKER: ICD-10-CM

## 2024-11-19 DIAGNOSIS — C22.0 HEPATOCELLULAR CARCINOMA (MULTI): ICD-10-CM

## 2024-11-19 DIAGNOSIS — F10.20 UNCOMPLICATED ALCOHOL DEPENDENCE (MULTI): ICD-10-CM

## 2024-11-19 PROCEDURE — 1160F RVW MEDS BY RX/DR IN RCRD: CPT | Performed by: INTERNAL MEDICINE

## 2024-11-19 PROCEDURE — 4010F ACE/ARB THERAPY RXD/TAKEN: CPT | Performed by: INTERNAL MEDICINE

## 2024-11-19 PROCEDURE — 3048F LDL-C <100 MG/DL: CPT | Performed by: INTERNAL MEDICINE

## 2024-11-19 PROCEDURE — 3075F SYST BP GE 130 - 139MM HG: CPT | Performed by: INTERNAL MEDICINE

## 2024-11-19 PROCEDURE — 99214 OFFICE O/P EST MOD 30 MIN: CPT | Performed by: INTERNAL MEDICINE

## 2024-11-19 PROCEDURE — 1159F MED LIST DOCD IN RCRD: CPT | Performed by: INTERNAL MEDICINE

## 2024-11-19 PROCEDURE — 3044F HG A1C LEVEL LT 7.0%: CPT | Performed by: INTERNAL MEDICINE

## 2024-11-19 PROCEDURE — 3079F DIAST BP 80-89 MM HG: CPT | Performed by: INTERNAL MEDICINE

## 2024-11-19 PROCEDURE — 3060F POS MICROALBUMINURIA REV: CPT | Performed by: INTERNAL MEDICINE

## 2024-11-19 PROCEDURE — 90750 HZV VACC RECOMBINANT IM: CPT | Performed by: INTERNAL MEDICINE

## 2024-11-19 PROCEDURE — 90471 IMMUNIZATION ADMIN: CPT | Performed by: INTERNAL MEDICINE

## 2024-11-19 ASSESSMENT — PATIENT HEALTH QUESTIONNAIRE - PHQ9
2. FEELING DOWN, DEPRESSED OR HOPELESS: NOT AT ALL
1. LITTLE INTEREST OR PLEASURE IN DOING THINGS: NOT AT ALL
SUM OF ALL RESPONSES TO PHQ9 QUESTIONS 1 AND 2: 0

## 2024-11-19 ASSESSMENT — ENCOUNTER SYMPTOMS
OCCASIONAL FEELINGS OF UNSTEADINESS: 0
DEPRESSION: 0
LOSS OF SENSATION IN FEET: 0

## 2024-11-19 NOTE — ASSESSMENT & PLAN NOTE
Patient with repeat quantitative viral load to document cure  Orders:    Hemoglobin A1C; Future    Lipid Panel; Future    Albumin-Creatinine Ratio, Urine Random; Future    Follow Up In [facility] - PCP - Established; Future

## 2024-11-19 NOTE — ASSESSMENT & PLAN NOTE
Patient is cutting down alcohol intake to 3 times a week continue to urged abstinence of alcohol consider naloxone therapy at next visit

## 2024-11-19 NOTE — PROGRESS NOTES
"Subjective   Patient ID: Hemant Ness is a 64 y.o. male who presents for Follow-up (3 month).    HPI     Review of Systems    Objective   /87   Pulse 88   Ht 1.753 m (5' 9\")   SpO2 98%   BMI 34.70 kg/m²     Physical Exam    Assessment/Plan          "

## 2024-11-19 NOTE — ASSESSMENT & PLAN NOTE
Patient recently completed intrahepatic therapy tolerated well continue active surveillance with hepatologist patient is scheduled for endoscopy and colonoscopy okay to proceed from medical standpoint no evidence of bleeding or anemia noted

## 2024-11-19 NOTE — ASSESSMENT & PLAN NOTE
Follow-up with hepatologist well compensated repeat viral load expect cure from treatment of chronic hepatitis C did complete series of hepatitis A and B vaccination patient received second vaccine for shingles vaccination contemplating on influenza and pneumococcal vaccine as well as tetanus vaccine highly recommended reevaluate next visit  Orders:    Hemoglobin A1C; Future    Lipid Panel; Future    Albumin-Creatinine Ratio, Urine Random; Future    Follow Up In [facility] - PCP - Established; Future

## 2024-11-19 NOTE — ASSESSMENT & PLAN NOTE
Reevaluate lipid profile on rosuvastatin 10 mg daily LDL goal less than 70  Orders:    Follow Up In [facility] - PCP - Established    Hemoglobin A1C; Future    Lipid Panel; Future    Albumin-Creatinine Ratio, Urine Random; Future    Follow Up [facility] - PCP - Established; Future

## 2024-11-19 NOTE — ASSESSMENT & PLAN NOTE
Patient continues to work at smoking currently smoking cut down to 3 times a week with alcohol intake encouraged smoking cessation precontemplative at this time

## 2024-11-19 NOTE — ASSESSMENT & PLAN NOTE
Stable at this time offered patient an outpatient physical therapy for traction continue gabapentin

## 2024-11-19 NOTE — ASSESSMENT & PLAN NOTE
Reevaluate hemoglobin A1c continues on dulaglutide 4.5 mg weekly with empagliflozin 25 mg daily tolerating well constipation  Orders:    Follow Up In [facility] - PCP - Established    Hemoglobin A1C; Future    Lipid Panel; Future    Albumin-Creatinine Ratio, Urine Random; Future    Follow Up [facility] - PCP - Established; Future

## 2024-11-19 NOTE — PROGRESS NOTES
"I spent more than 3 minutes but less than 10 minutes counseling patient about need for smoking/tobacco cessation and how I can get support efforts when patient is ready to quit.  Discussed nicotine replacement therapy such as bupropion nicotine replacement therapy and Chantix .  Hypnosis,support groups,and acupuncture are other potential options.Subjective   Reason for Visit: Hemant Ness is an 64 y.o. male here for a Medicare Wellness visit.     Past Medical, Surgical, and Family History reviewed and updated in chart.    Reviewed all medications by prescribing practitioner or clinical pharmacist (such as prescriptions, OTCs, herbal therapies and supplements) and documented in the medical record.    HPI    Patient Care Team:  Jose Juan Mukherjee DO as PCP - General  Jose Juan Mukherjee DO as PCP - [facility] P[facility]     Review of Systems   All other systems reviewed and are negative.      Objective   Vitals:  /80   Pulse 88   Ht 1.753 m (5' 9\")   SpO2 98%   BMI 34.70 kg/m²       Physical Exam  Vitals and nursing note reviewed.   Constitutional:       General: He is not in acute distress.     Appearance: Normal appearance. He is well-developed. He is obese. He is not toxic-appearing.   HENT:      Head: Normocephalic and atraumatic.      Right Ear: Tympanic membrane and external ear normal.      Left Ear: Tympanic membrane and external ear normal.      Nose: Nose normal.      Mouth/Throat:      Mouth: Mucous membranes are dry.      Pharynx: Oropharynx is clear. No oropharyngeal exudate or posterior oropharyngeal erythema.      Tonsils: No tonsillar exudate. 2+ on the right. 2+ on the left.   Eyes:      Extraocular Movements: Extraocular movements intact.      Conjunctiva/sclera: Conjunctivae normal.   Cardiovascular:      Rate and Rhythm: Normal rate and regular rhythm.      Pulses: Normal pulses.      Heart sounds: Normal heart sounds. No murmur heard.  Pulmonary:      Effort: Pulmonary effort is normal.    "   Breath sounds: Normal breath sounds.   Abdominal:      General: Abdomen is flat. Bowel sounds are normal.      Palpations: Abdomen is soft.   Musculoskeletal:      Cervical back: Neck supple.   Feet:      Right foot:      Skin integrity: Skin integrity normal. No ulcer, blister, skin breakdown, erythema, warmth or callus.      Toenail Condition: Right toenails are normal.      Left foot:      Skin integrity: Skin integrity normal. No ulcer, blister, skin breakdown, erythema, warmth or callus.      Toenail Condition: Left toenails are normal.   Lymphadenopathy:      Cervical: No cervical adenopathy.   Skin:     General: Skin is warm and dry.      Capillary Refill: Capillary refill takes more than 3 seconds.      Findings: No rash.   Neurological:      Mental Status: He is alert. Mental status is at baseline.      Sensory: Sensation is intact.   Psychiatric:         Mood and Affect: Mood normal.         Behavior: Behavior normal.         Thought Content: Thought content normal.         Judgment: Judgment normal.     Lifestyle Recommendations  I recommend a whole-food plant-based diet, an eating pattern that encourages the consumption of unrefined plant foods (such as fruits, vegetables, tubers, whole grains, legumes, nuts and seeds) and discourages meats, dairy products, eggs and processed foods.     The AHA/ACC recommends that the patient consume a dietary pattern that emphasizes intake of vegetables, fruits, and whole grains; includes low-fat dairy products, poultry, fish, legumes, non-tropical vegetable oils, and nuts; and limits intake of sodium, sweets, sugar-sweetened beverages, and red meats.  Adapt this dietary pattern to appropriate calorie requirements (a 500-750 kcal/day deficit to loose weight), personal and cultural food preferences, and nutrition therapy for other medical conditions (including diabetes).  Achieve this pattern by following plans such as the [place] Mediterranean, DASH dietary pattern, or  AHA diet.     Engage in 2 hours and 30 minutes per week of moderate-intensity physical activity, or 1 hour and 15 minutes (75 minutes) per week of vigorous-intensity aerobic physical activity, or an equivalent combination of moderate and vigorous-intensity aerobic physical activity. Aerobic activity should be performed in episodes of at least 10 minutes preferably spread throughout the week.     Adhering to a heart healthy diet, regular exercise habits, avoidance of tobacco products, and maintenance of a healthy weight are crucial components of their heart disease risk reduction.    Assessment & Plan  Diabetes mellitus type 2 with peripheral artery disease  Reevaluate hemoglobin A1c continues on dulaglutide 4.5 mg weekly with empagliflozin 25 mg daily tolerating well constipation  Orders:    Follow Up In [facility] - PCP - Established    Hemoglobin A1C; Future    Lipid Panel; Future    Albumin-Creatinine Ratio, Urine Random; Future    Follow Up In [facility] - PCP - Established; Future    Dyslipidemia associated with type 2 diabetes mellitus (Multi)  Reevaluate lipid profile on rosuvastatin 10 mg daily LDL goal less than 70  Orders:    Follow Up In [facility] - PCP - Established    Hemoglobin A1C; Future    Lipid Panel; Future    Albumin-Creatinine Ratio, Urine Random; Future    Follow Up In [facility] - PCP - Established; Future    Cirrhosis of liver without ascites, unspecified hepatic cirrhosis type (Multi)  Follow-up with hepatologist well compensated repeat viral load expect cure from treatment of chronic hepatitis C did complete series of hepatitis A and B vaccination patient received second vaccine for shingles vaccination contemplating on influenza and pneumococcal vaccine as well as tetanus vaccine highly recommended reevaluate next visit  Orders:    Hemoglobin A1C; Future    Lipid Panel; Future    Albumin-Creatinine Ratio, Urine Random; Future    Follow Up In  [facility] - PCP - Established; Future    Chronic hepatitis C without hepatic coma (Multi)  Patient with repeat quantitative viral load to document cure  Orders:    Hemoglobin A1C; Future    Lipid Panel; Future    Albumin-Creatinine Ratio, Urine Random; Future    Follow Up In [facility] - PCP - Established; Future    Hypertensive heart disease with chronic diastolic congestive heart failure  Well compensated continue losartan and empagliflozin along with dulaglutide continue metoprolol succinate  mg daily       Uncomplicated alcohol dependence (Multi)  Patient is cutting down alcohol intake to 3 times a week continue to urged abstinence of alcohol consider naloxone therapy at next visit       Acute cervical radiculopathy  Stable at this time offered patient an outpatient physical therapy for traction continue gabapentin       Current smoker  Patient continues to work at smoking currently smoking cut down to 3 times a week with alcohol intake encouraged smoking cessation precontemplative at this time       Hepatocellular carcinoma (Multi)  Patient recently completed intrahepatic therapy tolerated well continue active surveillance with hepatologist patient is scheduled for endoscopy and colonoscopy okay to proceed from medical standpoint no evidence of bleeding or anemia noted

## 2024-11-19 NOTE — ASSESSMENT & PLAN NOTE
Well compensated continue losartan and empagliflozin along with dulaglutide continue metoprolol succinate  mg daily

## 2024-11-21 ENCOUNTER — PHARMACY VISIT (OUTPATIENT)
Dept: PHARMACY | Facility: CLINIC | Age: 64
End: 2024-11-21
Payer: COMMERCIAL

## 2024-12-06 DIAGNOSIS — I10 HYPERTENSION, BENIGN: ICD-10-CM

## 2024-12-06 RX ORDER — LOSARTAN POTASSIUM 100 MG/1
100 TABLET ORAL DAILY
Qty: 90 TABLET | Refills: 0 | Status: SHIPPED | OUTPATIENT
Start: 2024-12-06

## 2024-12-13 PROCEDURE — RXMED WILLOW AMBULATORY MEDICATION CHARGE

## 2024-12-14 ENCOUNTER — PHARMACY VISIT (OUTPATIENT)
Dept: PHARMACY | Facility: CLINIC | Age: 64
End: 2024-12-14
Payer: COMMERCIAL

## 2025-01-09 DIAGNOSIS — E11.9 TYPE 2 DIABETES MELLITUS WITHOUT COMPLICATION, WITH LONG-TERM CURRENT USE OF INSULIN (MULTI): ICD-10-CM

## 2025-01-09 DIAGNOSIS — Z79.4 TYPE 2 DIABETES MELLITUS WITHOUT COMPLICATION, WITH LONG-TERM CURRENT USE OF INSULIN (MULTI): ICD-10-CM

## 2025-01-09 PROCEDURE — RXMED WILLOW AMBULATORY MEDICATION CHARGE

## 2025-01-09 RX ORDER — DULAGLUTIDE 4.5 MG/.5ML
INJECTION, SOLUTION SUBCUTANEOUS
Qty: 2 ML | Refills: 11 | Status: SHIPPED | OUTPATIENT
Start: 2025-01-09 | End: 2026-01-09

## 2025-01-14 ENCOUNTER — PHARMACY VISIT (OUTPATIENT)
Dept: PHARMACY | Facility: CLINIC | Age: 65
End: 2025-01-14
Payer: COMMERCIAL

## 2025-01-15 DIAGNOSIS — I10 HYPERTENSION, BENIGN: ICD-10-CM

## 2025-01-15 DIAGNOSIS — I21.02 ST ELEVATION MYOCARDIAL INFARCTION INVOLVING LEFT ANTERIOR DESCENDING (LAD) CORONARY ARTERY (MULTI): ICD-10-CM

## 2025-01-15 DIAGNOSIS — N40.0 BPH WITH ELEVATED PSA: ICD-10-CM

## 2025-01-15 DIAGNOSIS — F17.200 CURRENT SMOKER: ICD-10-CM

## 2025-01-15 DIAGNOSIS — E11.65 TYPE 2 DIABETES MELLITUS WITH HYPERGLYCEMIA, WITHOUT LONG-TERM CURRENT USE OF INSULIN: ICD-10-CM

## 2025-01-15 DIAGNOSIS — F10.20 UNCOMPLICATED ALCOHOL DEPENDENCE (MULTI): ICD-10-CM

## 2025-01-15 DIAGNOSIS — R97.20 BPH WITH ELEVATED PSA: ICD-10-CM

## 2025-01-15 RX ORDER — METOPROLOL SUCCINATE 100 MG/1
TABLET, EXTENDED RELEASE ORAL
Qty: 90 TABLET | Refills: 3 | Status: SHIPPED | OUTPATIENT
Start: 2025-01-15

## 2025-01-22 ENCOUNTER — APPOINTMENT (OUTPATIENT)
Dept: PHARMACY | Facility: HOSPITAL | Age: 65
End: 2025-01-22
Payer: COMMERCIAL

## 2025-01-22 DIAGNOSIS — E11.65 TYPE 2 DIABETES MELLITUS WITH HYPERGLYCEMIA, WITHOUT LONG-TERM CURRENT USE OF INSULIN: ICD-10-CM

## 2025-01-22 ASSESSMENT — ENCOUNTER SYMPTOMS: DIABETIC ASSOCIATED SYMPTOMS: 0

## 2025-01-22 NOTE — PROGRESS NOTES
Pharmacy Clinic Note    Hemant Ness is a 64 y.o. male was referred to Clinical Pharmacy Team for diabetes management.     Referring Provider: Jose Juan Mukherjee DO    Subjective   Allergies   Allergen Reactions    Metformin Diarrhea       [facility]  [address], [address]  [facility] [address]  Phone: [phone] Fax: [phone]    [facility] - [place], [place] - [address]  [address]  [facility] [address]  [facility] [address]  Phone: [phone] Fax: [phone]    [id number] - [place], [place] - [address]  [address] [facility] [address]  Phone: [phone] Fax: [phone]     [facility]  [address] [facility] [address]  Phone: [phone] Fax: [phone]      Diabetes  He presents for his follow-up diabetic visit. He has type 2 diabetes mellitus. His disease course has been stable. There are no hypoglycemic associated symptoms. There are no diabetic associated symptoms. There are no hypoglycemic complications. There are no diabetic complications. Risk factors for coronary artery disease include diabetes mellitus, male sex and obesity. Current diabetic treatment includes oral agent (monotherapy) (Trulicity 4.5 mg and Jardiance 25 mg). He is compliant with treatment all of the time. His weight is decreasing steadily. His home blood glucose trend is decreasing steadily. His breakfast blood glucose range is generally 110-130 mg/dl. His dinner blood glucose range is generally  mg/dl. His overall blood glucose range is  mg/dl. An ACE inhibitor/angiotensin II receptor blocker is being taken.       Objective     There were no vitals taken for this visit.     LAB  Lab Results   Component Value Date    BILITOT 0.4 10/21/2024    CALCIUM 9.9 10/21/2024    CO2 23 10/21/2024     10/21/2024    CREATININE 0.99 10/21/2024    GLUCOSE 120 (H) 10/21/2024    ALKPHOS 87 10/21/2024    K 4.7 10/21/2024     PROT 7.2 10/21/2024     10/21/2024    AST 21 10/21/2024    ALT 24 10/21/2024    BUN 20 10/21/2024    ANIONGAP 14 10/21/2024    MG 1.89 07/22/2021    ALBUMIN 4.3 10/21/2024    EGFR 84 10/21/2024     Lab Results   Component Value Date    TRIG 112 03/04/2024    CHOL 142 03/04/2024    LDLCALC 81 03/04/2024    HDL 38.3 03/04/2024     Lab Results   Component Value Date    HGBA1C 6.4 (H) 03/04/2024       Current Outpatient Medications on File Prior to Visit   Medication Sig Dispense Refill    aspirin 81 mg EC tablet Take 1 tablet (81 mg) by mouth once daily.      cyclobenzaprine (Flexeril) 10 mg tablet Take 1 tablet (10 mg) by mouth 3 times a day as needed for muscle spasms. 30 tablet 0    dulaglutide (Trulicity) 4.5 mg/0.5 mL pen injector INJECT ONE PEN (4.5 MG) UNDER THE SKIN ONCE WEEKLY 2 mL 11    empagliflozin (Jardiance) 25 mg TAKE 1 TABLET (25 MG) BY MOUTH ONCE DAILY. 90 tablet 3    gabapentin (Neurontin) 100 mg capsule Take 1 capsule (100 mg) by mouth once daily at bedtime. 90 capsule 3    losartan (Cozaar) 100 mg tablet TAKE ONE TABLET BY MOUTH ONCE EVERY DAY 90 tablet 0    metoprolol succinate XL (Toprol-XL) 100 mg 24 hr tablet TAKE ONE TABLET BY MOUTH ONCE DAILY. DO NOT CRUSH OR CHEW 90 tablet 3    nitroglycerin (Nitrostat) 0.4 mg SL tablet Place under the tongue.      OneTouch Delica Plus Lancet 30 gauge misc       OneTouch Ultra Test strip       OneTouch Ultra2 Meter misc       rosuvastatin (Crestor) 10 mg tablet Take 1 tablet (10 mg) by mouth once daily. 100 tablet 3     No current facility-administered medications on file prior to visit.        HISTORICAL PHARMACOTHERAPY  - Metformin: diarrhea (moderate-severe)    Weight down to 235 lbs (previously 260 lbs)    DRUG INTERACTIONS  - No significant drug-drug interactions exist that require change in therapy  _______________________________________________________________________  PATIENT EDUCATION/GOALS    1. Discussed disease specific goals:   - Fasting  B - 130 mg/dL  - Postprandial BG: less than 180 mg/dL  - A1c: less than 7%    2.  Since last visit, patient's BG remain well controlled on Trulicity andd Jardiance. Patient to continue current therapies of Jardiance 25 mg and Trulicity 4.5 mg and follow up in 6 months. Patient currently involved with interventional radiology for liver and will contact team if any questions arise .  _______________________________________________________________________    Assessment/Plan   Problem List Items Addressed This Visit       Type 2 diabetes mellitus with hyperglycemia, without long-term current use of insulin     1. Continue Jardiance 25 mg and Trulcity 4.5 mg           Continue all meds under the continuation of care with the referring provider and clinical pharmacy team.    Clinical Pharmacist follow-up: 6 months    Sushant Jessica, PharmD     Verbal consent to manage patient's drug therapy was obtained from [the patient and/or an individual authorized to act on behalf of a patient]. They were informed they may decline to participate or withdraw from participation in pharmacy services at any time.

## 2025-02-05 ENCOUNTER — SPECIALTY PHARMACY (OUTPATIENT)
Dept: PHARMACY | Facility: CLINIC | Age: 65
End: 2025-02-05

## 2025-02-05 ENCOUNTER — TELEPHONE (OUTPATIENT)
Dept: GASTROENTEROLOGY | Facility: CLINIC | Age: 65
End: 2025-02-05
Payer: COMMERCIAL

## 2025-02-05 NOTE — TELEPHONE ENCOUNTER
Received call from patient, he reports having an MRI scheduled this month and is requesting follow up with Dr Park.

## 2025-02-05 NOTE — PROGRESS NOTES
3 phone call attempts have been made to the patient, to remind him that he's overdue on HCV labs. Messages were left with my direct extension.

## 2025-02-06 PROCEDURE — RXMED WILLOW AMBULATORY MEDICATION CHARGE

## 2025-02-11 ENCOUNTER — PHARMACY VISIT (OUTPATIENT)
Dept: PHARMACY | Facility: CLINIC | Age: 65
End: 2025-02-11
Payer: COMMERCIAL

## 2025-02-18 ENCOUNTER — APPOINTMENT (OUTPATIENT)
Dept: RADIOLOGY | Facility: CLINIC | Age: 65
End: 2025-02-18
Payer: COMMERCIAL

## 2025-02-19 ENCOUNTER — HOSPITAL ENCOUNTER (OUTPATIENT)
Dept: RADIOLOGY | Facility: CLINIC | Age: 65
Discharge: HOME | End: 2025-02-19
Payer: COMMERCIAL

## 2025-02-19 DIAGNOSIS — K74.60 HEPATIC CIRRHOSIS DUE TO CHRONIC HEPATITIS C INFECTION (MULTI): ICD-10-CM

## 2025-02-19 DIAGNOSIS — B18.2 HEPATIC CIRRHOSIS DUE TO CHRONIC HEPATITIS C INFECTION (MULTI): ICD-10-CM

## 2025-02-19 DIAGNOSIS — C22.0 HCC (HEPATOCELLULAR CARCINOMA) (MULTI): ICD-10-CM

## 2025-02-19 PROCEDURE — 74183 MRI ABD W/O CNTR FLWD CNTR: CPT

## 2025-02-19 PROCEDURE — 2550000001 HC RX 255 CONTRASTS: Performed by: INTERNAL MEDICINE

## 2025-02-19 PROCEDURE — A9575 INJ GADOTERATE MEGLUMI 0.1ML: HCPCS | Performed by: INTERNAL MEDICINE

## 2025-02-19 RX ORDER — GADOTERATE MEGLUMINE 376.9 MG/ML
20 INJECTION INTRAVENOUS
Status: COMPLETED | OUTPATIENT
Start: 2025-02-19 | End: 2025-02-19

## 2025-02-19 RX ADMIN — GADOTERATE MEGLUMINE 20 ML: 376.9 INJECTION INTRAVENOUS at 11:03

## 2025-02-20 DIAGNOSIS — C22.0 HCC (HEPATOCELLULAR CARCINOMA) (MULTI): ICD-10-CM

## 2025-02-24 ENCOUNTER — TUMOR BOARD CONFERENCE (OUTPATIENT)
Dept: HEMATOLOGY/ONCOLOGY | Facility: HOSPITAL | Age: 65
End: 2025-02-24
Payer: COMMERCIAL

## 2025-02-24 NOTE — TUMOR BOARD NOTE
HEPATOLOGY ATTENDING NOTE      The patient was discussed at the liver radiology tumor board meeting today.    The radiologist felt that the patient has more active liver cancer distant from the original treatment site.    The recommendation was to refer to medical oncology (Dr. Tai Ramon) for systemic HCC therapy.    SUZAN Park.

## 2025-02-25 ENCOUNTER — TELEPHONE (OUTPATIENT)
Dept: GASTROENTEROLOGY | Facility: CLINIC | Age: 65
End: 2025-02-25
Payer: COMMERCIAL

## 2025-02-25 DIAGNOSIS — B18.2 HEPATIC CIRRHOSIS DUE TO CHRONIC HEPATITIS C INFECTION (MULTI): ICD-10-CM

## 2025-02-25 DIAGNOSIS — C22.0 HCC (HEPATOCELLULAR CARCINOMA) (MULTI): Primary | ICD-10-CM

## 2025-02-25 DIAGNOSIS — K74.60 HEPATIC CIRRHOSIS DUE TO CHRONIC HEPATITIS C INFECTION (MULTI): ICD-10-CM

## 2025-02-25 NOTE — TELEPHONE ENCOUNTER
Called patient to discuss the following results and plan per Dr Park:  HEPATOLOGY ATTENDING NOTE      The patient was discussed at the liver radiology tumor board meeting today.     The radiologist felt that the patient has more active liver cancer distant from the original treatment site.     The recommendation was to refer to medical oncology (Dr. Tai Ramon) for systemic HCC therapy.     SUZAN Park.

## 2025-02-27 LAB
ALBUMIN SERPL-MCNC: 4.4 G/DL (ref 3.6–5.1)
ALP SERPL-CCNC: 84 U/L (ref 35–144)
ALT SERPL-CCNC: 24 U/L (ref 9–46)
ANION GAP SERPL CALCULATED.4IONS-SCNC: 10 MMOL/L (CALC) (ref 7–17)
AST SERPL-CCNC: 27 U/L (ref 10–35)
BILIRUB SERPL-MCNC: 0.6 MG/DL (ref 0.2–1.2)
BUN SERPL-MCNC: 18 MG/DL (ref 7–25)
CALCIUM SERPL-MCNC: 9.9 MG/DL (ref 8.6–10.3)
CHLORIDE SERPL-SCNC: 102 MMOL/L (ref 98–110)
CHOLEST SERPL-MCNC: 153 MG/DL
CHOLEST/HDLC SERPL: 3.5 (CALC)
CO2 SERPL-SCNC: 26 MMOL/L (ref 20–32)
CREAT SERPL-MCNC: 1.03 MG/DL (ref 0.7–1.35)
EGFRCR SERPLBLD CKD-EPI 2021: 80 ML/MIN/1.73M2
EST. AVERAGE GLUCOSE BLD GHB EST-MCNC: 134 MG/DL
EST. AVERAGE GLUCOSE BLD GHB EST-SCNC: 7.4 MMOL/L
GLUCOSE SERPL-MCNC: 124 MG/DL (ref 65–99)
HBA1C MFR BLD: 6.3 % OF TOTAL HGB
HCV RNA SERPL NAA+PROBE-ACNC: NORMAL IU/ML
HCV RNA SERPL NAA+PROBE-LOG IU: NORMAL LOG IU/ML
HDLC SERPL-MCNC: 44 MG/DL
LDLC SERPL CALC-MCNC: 78 MG/DL (CALC)
NONHDLC SERPL-MCNC: 109 MG/DL (CALC)
POTASSIUM SERPL-SCNC: 4.6 MMOL/L (ref 3.5–5.3)
PROT SERPL-MCNC: 7.6 G/DL (ref 6.1–8.1)
SODIUM SERPL-SCNC: 138 MMOL/L (ref 135–146)
TRIGL SERPL-MCNC: 223 MG/DL

## 2025-03-03 ENCOUNTER — APPOINTMENT (OUTPATIENT)
Dept: PRIMARY CARE | Facility: CLINIC | Age: 65
End: 2025-03-03
Payer: COMMERCIAL

## 2025-03-03 VITALS
DIASTOLIC BLOOD PRESSURE: 78 MMHG | SYSTOLIC BLOOD PRESSURE: 120 MMHG | HEIGHT: 69 IN | BODY MASS INDEX: 35.55 KG/M2 | HEART RATE: 68 BPM | WEIGHT: 240 LBS

## 2025-03-03 DIAGNOSIS — I50.32 HYPERTENSIVE HEART DISEASE WITH CHRONIC DIASTOLIC CONGESTIVE HEART FAILURE: ICD-10-CM

## 2025-03-03 DIAGNOSIS — E11.69 DYSLIPIDEMIA ASSOCIATED WITH TYPE 2 DIABETES MELLITUS (MULTI): ICD-10-CM

## 2025-03-03 DIAGNOSIS — F17.200 CURRENT SMOKER: ICD-10-CM

## 2025-03-03 DIAGNOSIS — E11.51 DIABETES MELLITUS TYPE 2 WITH PERIPHERAL ARTERY DISEASE: Primary | ICD-10-CM

## 2025-03-03 DIAGNOSIS — E78.5 DYSLIPIDEMIA ASSOCIATED WITH TYPE 2 DIABETES MELLITUS (MULTI): ICD-10-CM

## 2025-03-03 DIAGNOSIS — B18.2 CHRONIC HEPATITIS C WITHOUT HEPATIC COMA (MULTI): ICD-10-CM

## 2025-03-03 DIAGNOSIS — E66.812 CLASS 2 SEVERE OBESITY DUE TO EXCESS CALORIES WITH SERIOUS COMORBIDITY AND BODY MASS INDEX (BMI) OF 35.0 TO 35.9 IN ADULT: ICD-10-CM

## 2025-03-03 DIAGNOSIS — Z92.29 HEPATITIS A AND HEPATITIS B VACCINE ADMINISTERED: ICD-10-CM

## 2025-03-03 DIAGNOSIS — K74.60 CIRRHOSIS OF LIVER WITHOUT ASCITES, UNSPECIFIED HEPATIC CIRRHOSIS TYPE (MULTI): ICD-10-CM

## 2025-03-03 DIAGNOSIS — E11.65 TYPE 2 DIABETES MELLITUS WITH HYPERGLYCEMIA, WITHOUT LONG-TERM CURRENT USE OF INSULIN: ICD-10-CM

## 2025-03-03 DIAGNOSIS — I11.0 HYPERTENSIVE HEART DISEASE WITH CHRONIC DIASTOLIC CONGESTIVE HEART FAILURE: ICD-10-CM

## 2025-03-03 DIAGNOSIS — D12.2 ADENOMATOUS POLYP OF ASCENDING COLON: ICD-10-CM

## 2025-03-03 DIAGNOSIS — E66.01 CLASS 2 SEVERE OBESITY DUE TO EXCESS CALORIES WITH SERIOUS COMORBIDITY AND BODY MASS INDEX (BMI) OF 35.0 TO 35.9 IN ADULT: ICD-10-CM

## 2025-03-03 DIAGNOSIS — F10.20 UNCOMPLICATED ALCOHOL DEPENDENCE (MULTI): ICD-10-CM

## 2025-03-03 DIAGNOSIS — C22.0 HEPATOCELLULAR CARCINOMA (MULTI): ICD-10-CM

## 2025-03-03 PROCEDURE — 90471 IMMUNIZATION ADMIN: CPT | Performed by: INTERNAL MEDICINE

## 2025-03-03 PROCEDURE — 99406 BEHAV CHNG SMOKING 3-10 MIN: CPT | Performed by: INTERNAL MEDICINE

## 2025-03-03 PROCEDURE — 1160F RVW MEDS BY RX/DR IN RCRD: CPT | Performed by: INTERNAL MEDICINE

## 2025-03-03 PROCEDURE — 1159F MED LIST DOCD IN RCRD: CPT | Performed by: INTERNAL MEDICINE

## 2025-03-03 PROCEDURE — 3078F DIAST BP <80 MM HG: CPT | Performed by: INTERNAL MEDICINE

## 2025-03-03 PROCEDURE — 3074F SYST BP LT 130 MM HG: CPT | Performed by: INTERNAL MEDICINE

## 2025-03-03 PROCEDURE — 90636 HEP A/HEP B VACC ADULT IM: CPT | Performed by: INTERNAL MEDICINE

## 2025-03-03 PROCEDURE — 3008F BODY MASS INDEX DOCD: CPT | Performed by: INTERNAL MEDICINE

## 2025-03-03 PROCEDURE — G2211 COMPLEX E/M VISIT ADD ON: HCPCS | Performed by: INTERNAL MEDICINE

## 2025-03-03 PROCEDURE — 99214 OFFICE O/P EST MOD 30 MIN: CPT | Performed by: INTERNAL MEDICINE

## 2025-03-03 PROCEDURE — 4010F ACE/ARB THERAPY RXD/TAKEN: CPT | Performed by: INTERNAL MEDICINE

## 2025-03-03 ASSESSMENT — ANXIETY QUESTIONNAIRES
6. BECOMING EASILY ANNOYED OR IRRITABLE: NOT AT ALL
2. NOT BEING ABLE TO STOP OR CONTROL WORRYING: NOT AT ALL
3. WORRYING TOO MUCH ABOUT DIFFERENT THINGS: NOT AT ALL
5. BEING SO RESTLESS THAT IT IS HARD TO SIT STILL: NOT AT ALL
IF YOU CHECKED OFF ANY PROBLEMS ON THIS QUESTIONNAIRE, HOW DIFFICULT HAVE THESE PROBLEMS MADE IT FOR YOU TO DO YOUR WORK, TAKE CARE OF THINGS AT HOME, OR GET ALONG WITH OTHER PEOPLE: NOT DIFFICULT AT ALL
1. FEELING NERVOUS, ANXIOUS, OR ON EDGE: NOT AT ALL
7. FEELING AFRAID AS IF SOMETHING AWFUL MIGHT HAPPEN: NOT AT ALL
GAD7 TOTAL SCORE: 0
4. TROUBLE RELAXING: NOT AT ALL

## 2025-03-03 ASSESSMENT — ENCOUNTER SYMPTOMS
OCCASIONAL FEELINGS OF UNSTEADINESS: 0
LOSS OF SENSATION IN FEET: 0
DEPRESSION: 0

## 2025-03-03 ASSESSMENT — COLUMBIA-SUICIDE SEVERITY RATING SCALE - C-SSRS
2. HAVE YOU ACTUALLY HAD ANY THOUGHTS OF KILLING YOURSELF?: NO
1. IN THE PAST MONTH, HAVE YOU WISHED YOU WERE DEAD OR WISHED YOU COULD GO TO SLEEP AND NOT WAKE UP?: NO
6. HAVE YOU EVER DONE ANYTHING, STARTED TO DO ANYTHING, OR PREPARED TO DO ANYTHING TO END YOUR LIFE?: NO

## 2025-03-03 NOTE — ASSESSMENT & PLAN NOTE
Well compensated with stable blood pressure control optimal on losartan 100 mg daily with empagliflozin 25 mg daily metoprolol succinate 100 mg daily

## 2025-03-03 NOTE — ASSESSMENT & PLAN NOTE
Currently in remission work on smoking cessation 18 cigarettes a day        Denial letter faxed to [phone] back with additional information(within past 6 months of pharmacy fill records for [employer]) that insurance is requesting along with appeal letter.

## 2025-03-03 NOTE — ASSESSMENT & PLAN NOTE
Given Twinrix today encouraged patient to get influenza and pneumococcal vaccine booster  Orders:    Hepatitis A hepatitis B combined vaccine IM

## 2025-03-03 NOTE — ASSESSMENT & PLAN NOTE
Hemoglobin A1c improved to 6.3 fasting blood sugar of 124 continues on dulaglutide 4.5 mg subcu weekly with empagliflozin 25 mg daily in the setting of cardiovascular disease optimal  Orders:    Follow Up In [facility] - PCP - Established    Comprehensive Metabolic Panel; Future    Hemoglobin A1C; Future    Lipid Panel; Future    Albumin-Creatinine Ratio, Urine Random; Future    Follow Up In [facility] - PCP - Established; Future

## 2025-03-03 NOTE — PROGRESS NOTES
"Subjective   Reason for Visit: Hemant Ness is an 64 y.o. male here for a  fu visit.     Past Medical, Surgical, and Family History reviewed and updated in chart.         HPI    Patient Care Team:  Jose Juan Mukherjee DO as PCP - General  Jose Juan Mukherjee DO as PCP - [facility] PCP     Review of Systems   All other systems reviewed and are negative.      Objective   Vitals:  /78   Pulse 68   Ht 1.753 m (5' 9\")   Wt 109 kg (240 lb)   BMI 35.44 kg/m²       Physical Exam  Vitals and nursing note reviewed.   Constitutional:       General: He is not in acute distress.     Appearance: Normal appearance. He is well-developed. He is obese. He is not toxic-appearing.   HENT:      Head: Normocephalic and atraumatic.      Right Ear: Tympanic membrane and external ear normal.      Left Ear: Tympanic membrane and external ear normal.      Nose: Nose normal.      Mouth/Throat:      Mouth: Mucous membranes are moist.      Pharynx: Oropharynx is clear. No oropharyngeal exudate or posterior oropharyngeal erythema.      Tonsils: No tonsillar exudate. 2+ on the right. 2+ on the left.   Eyes:      Extraocular Movements: Extraocular movements intact.      Conjunctiva/sclera: Conjunctivae normal.   Cardiovascular:      Rate and Rhythm: Normal rate and regular rhythm.      Pulses: Normal pulses.      Heart sounds: Normal heart sounds. No murmur heard.  Pulmonary:      Effort: Pulmonary effort is normal.      Breath sounds: Normal breath sounds.   Abdominal:      General: Abdomen is flat. Bowel sounds are normal.      Palpations: Abdomen is soft.   Musculoskeletal:      Cervical back: Neck supple.   Feet:      Right foot:      Skin integrity: Skin integrity normal. No ulcer, blister, skin breakdown, erythema, warmth or callus.      Toenail Condition: Right toenails are normal.      Left foot:      Skin integrity: Skin integrity normal. No ulcer, blister, skin breakdown, erythema, warmth or callus.      Toenail Condition: " Left toenails are normal.   Lymphadenopathy:      Cervical: No cervical adenopathy.   Skin:     General: Skin is warm and dry.      Capillary Refill: Capillary refill takes more than 3 seconds.      Findings: No rash.   Neurological:      Mental Status: He is alert. Mental status is at baseline.      Sensory: Sensation is intact.   Psychiatric:         Mood and Affect: Mood normal.         Behavior: Behavior normal.         Thought Content: Thought content normal.         Judgment: Judgment normal.       Patient Health Questionnaire-2 Score: 0 (3/3/2025  9:20 AM).  This indicates a positive screen but may not meet the criteria for a clinical depression diagnosis. Symptoms were reviewed with Hemant.  Follow-up within the next 3 months is recommended to re-assess symptoms and monitor mental health status.     Patient Health Questionnaire-2 Score: 0 (3/3/2025  9:20 AM).  This indicates a positive screen but may not meet the criteria for a clinical depression diagnosis. Symptoms were reviewed with Hemant.  Follow-up within the next 3 months is recommended to re-assess symptoms and monitor mental health status.       Assessment & Plan  Diabetes mellitus type 2 with peripheral artery disease  Hemoglobin A1c improved to 6.3 fasting blood sugar of 124 continues on dulaglutide 4.5 mg subcu weekly with empagliflozin 25 mg daily in the setting of cardiovascular disease optimal  Orders:    Follow Up In [facility] - PCP - Established    Comprehensive Metabolic Panel; Future    Hemoglobin A1C; Future    Lipid Panel; Future    Albumin-Creatinine Ratio, Urine Random; Future    Follow Up In [facility] - PCP - Established; Future    Dyslipidemia associated with type 2 diabetes mellitus (Multi)  LDL cholesterol optimal at 78 with triglycerides elevated continue to work with diet and exercise changes on rosuvastatin 10 mg daily consider increasing medication after next evaluation for hepatoma  Orders:    Follow Up  In [facility] - PCP - Established    Cirrhosis of liver without ascites, unspecified hepatic cirrhosis type (Multi)  Well compensated at this time.  Patient has stopped alcohol continues on furosemide and spironolactone  Orders:    Follow Up In [facility] - PCP - Established    Chronic hepatitis C without hepatic coma (Multi)  Given third booster dose of hepatitis A and B vaccine for full protection completed course of hepatitis C treatments with no evidence of viral load doing very well continue to follow with hepatologist  Orders:    Follow Up In [facility] - PCP - Established    Hepatitis A hepatitis B combined vaccine IM    Hepatitis A and hepatitis B vaccine administered  Given Twinrix today encouraged patient to get influenza and pneumococcal vaccine booster  Orders:    Hepatitis A hepatitis B combined vaccine IM      Hepatocellular carcinoma (Multi)  Stable disease follows up with hepatologist in regards to MR of the liver post ablation       Hypertensive heart disease with chronic diastolic congestive heart failure  Well compensated with stable blood pressure control optimal on losartan 100 mg daily with empagliflozin 25 mg daily metoprolol succinate 100 mg daily       Type 2 diabetes mellitus with hyperglycemia, without long-term current use of insulin  No evidence of microalbuminuria continue empagliflozin 25 mg daily with dulaglutide 4.5 mg subcu weekly       Uncomplicated alcohol dependence (Multi)  Currently in remission work on smoking cessation 18 cigarettes a day       Adenomatous polyp of ascending colon  Continue with active surveillance last colonoscopy completed Earnestine 10, 2024       Class 2 severe obesity due to excess calories with serious comorbidity and body mass index (BMI) of 35.0 to 35.9 in adult  The patient received Current weight: 109 kg (240 lb)  Weight change since last visit (-) denotes wt loss 5 lbs   Weight loss needed to achieve BMI 25: 71.1  Lbs  Weight loss needed to achieve BMI 30: 37.3 Lbs    Provided instructions on dietary changes  Provided instructions on exercise  Advised to Increase physical activity because they have an above normal BMI.        Current smoker  Given smoking cessation education today smoking 18 cigarettes a day not ready to quit completely reevaluate next visit              Alcohol Use Counseling  15 - 20 minutes were spent counseling the patient and offering support/resources on alcohol use disorder.    Cardiac Risk Assessment  15 - 20 minutes were spent discussing Cardiovascular risk and, if needed, lifestyle modifications were recommended, including nutritional choices, exercise, and elimination of habits contributing to risk.   Aspirin use/disuse was discussed following the guidelines below:  low dose ASA ( mg) should be considered:    If prior Heart Attack/Stroke/Peripheral vascular disease:  Generally recommend daily low dose aspirin unless extremely high bleeding risk (e.g., gastrointestinal).    If no prior Heart Attack/Stroke/Peripheral vascular disease:              Age over 70: Do not use Aspirin for prevention    Age less than 70 and 10-year cardiovascular disease risk is >20%: use low dose Aspirin for prevention.                 Depression Screening  5 - 10 minutes were spent screening for depression.    Obesity Counseling  15 - 20 minutes were spent counseling on diet and exercise interventions to address obesity and weight reduction.     Tobacco Counseling  3 - 5 minutes were spent counseling the patient on tobacco cessation.  Benefits of cessation were discussed as well as techniques to help quit.

## 2025-03-03 NOTE — ASSESSMENT & PLAN NOTE
Given third booster dose of hepatitis A and B vaccine for full protection completed course of hepatitis C treatments with no evidence of viral load doing very well continue to follow with hepatologist  Orders:    Follow Up In [facility] - PCP - Established    Hepatitis A hepatitis B combined vaccine IM

## 2025-03-03 NOTE — ASSESSMENT & PLAN NOTE
Well compensated at this time.  Patient has stopped alcohol continues on furosemide and spironolactone  Orders:    Follow Up In [facility] - PCP - Established

## 2025-03-03 NOTE — ASSESSMENT & PLAN NOTE
No evidence of microalbuminuria continue empagliflozin 25 mg daily with dulaglutide 4.5 mg subcu weekly

## 2025-03-03 NOTE — PROGRESS NOTES
"Subjective   Patient ID: Hemant Ness is a 64 y.o. male who presents for Follow-up.    HPI     Review of Systems    Objective   /78   Pulse 68   Ht 1.753 m (5' 9\")   Wt 109 kg (240 lb)   BMI 35.44 kg/m²     Physical Exam    Assessment/Plan          "

## 2025-03-03 NOTE — ASSESSMENT & PLAN NOTE
LDL cholesterol optimal at 78 with triglycerides elevated continue to work with diet and exercise changes on rosuvastatin 10 mg daily consider increasing medication after next evaluation for hepatoma  Orders:    Follow Up In [facility] - PCP - Established

## 2025-03-04 PROBLEM — E66.812 CLASS 2 SEVERE OBESITY DUE TO EXCESS CALORIES WITH SERIOUS COMORBIDITY AND BODY MASS INDEX (BMI) OF 35.0 TO 35.9 IN ADULT: Status: ACTIVE | Noted: 2025-03-04

## 2025-03-04 PROBLEM — B18.2 CHRONIC HEPATITIS C WITHOUT HEPATIC COMA (MULTI): Status: RESOLVED | Noted: 2024-04-15 | Resolved: 2025-03-04

## 2025-03-04 PROBLEM — E66.01 CLASS 2 SEVERE OBESITY DUE TO EXCESS CALORIES WITH SERIOUS COMORBIDITY AND BODY MASS INDEX (BMI) OF 35.0 TO 35.9 IN ADULT: Status: ACTIVE | Noted: 2025-03-04

## 2025-03-04 PROCEDURE — RXMED WILLOW AMBULATORY MEDICATION CHARGE

## 2025-03-04 NOTE — ASSESSMENT & PLAN NOTE
The patient received Current weight: 109 kg (240 lb)  Weight change since last visit (-) denotes wt loss 5 lbs   Weight loss needed to achieve BMI 25: 71.1 Lbs  Weight loss needed to achieve BMI 30: 37.3 Lbs    Provided instructions on dietary changes  Provided instructions on exercise  Advised to Increase physical activity because they have an above normal BMI.

## 2025-03-04 NOTE — ASSESSMENT & PLAN NOTE
Given smoking cessation education today smoking 18 cigarettes a day not ready to quit completely reevaluate next visit

## 2025-03-07 ENCOUNTER — PHARMACY VISIT (OUTPATIENT)
Dept: PHARMACY | Facility: CLINIC | Age: 65
End: 2025-03-07
Payer: COMMERCIAL

## 2025-03-17 DIAGNOSIS — I10 HYPERTENSION, BENIGN: ICD-10-CM

## 2025-03-17 RX ORDER — LOSARTAN POTASSIUM 100 MG/1
100 TABLET ORAL DAILY
Qty: 90 TABLET | Refills: 0 | Status: SHIPPED | OUTPATIENT
Start: 2025-03-17

## 2025-04-04 ENCOUNTER — TELEPHONE (OUTPATIENT)
Facility: CLINIC | Age: 65
End: 2025-04-04

## 2025-04-04 ENCOUNTER — OFFICE VISIT (OUTPATIENT)
Dept: HEMATOLOGY/ONCOLOGY | Facility: CLINIC | Age: 65
End: 2025-04-04
Payer: COMMERCIAL

## 2025-04-04 ENCOUNTER — DOCUMENTATION (OUTPATIENT)
Dept: CASE MANAGEMENT | Facility: HOSPITAL | Age: 65
End: 2025-04-04

## 2025-04-04 ENCOUNTER — LAB (OUTPATIENT)
Dept: LAB | Facility: CLINIC | Age: 65
End: 2025-04-04
Payer: COMMERCIAL

## 2025-04-04 ENCOUNTER — PATIENT OUTREACH (OUTPATIENT)
Dept: HEMATOLOGY/ONCOLOGY | Facility: CLINIC | Age: 65
End: 2025-04-04

## 2025-04-04 ENCOUNTER — ONCOLOGY MEDICATION OUTREACH (OUTPATIENT)
Dept: HEMATOLOGY/ONCOLOGY | Facility: HOSPITAL | Age: 65
End: 2025-04-04

## 2025-04-04 VITALS
OXYGEN SATURATION: 93 % | RESPIRATION RATE: 18 BRPM | DIASTOLIC BLOOD PRESSURE: 77 MMHG | HEART RATE: 88 BPM | TEMPERATURE: 98.2 F | HEIGHT: 69 IN | BODY MASS INDEX: 35.73 KG/M2 | SYSTOLIC BLOOD PRESSURE: 116 MMHG | WEIGHT: 241.2 LBS

## 2025-04-04 DIAGNOSIS — K74.60 HEPATIC CIRRHOSIS DUE TO CHRONIC HEPATITIS C INFECTION (MULTI): ICD-10-CM

## 2025-04-04 DIAGNOSIS — C22.0 HCC (HEPATOCELLULAR CARCINOMA): Primary | ICD-10-CM

## 2025-04-04 DIAGNOSIS — B18.2 HEPATIC CIRRHOSIS DUE TO CHRONIC HEPATITIS C INFECTION (MULTI): ICD-10-CM

## 2025-04-04 DIAGNOSIS — C22.0 HCC (HEPATOCELLULAR CARCINOMA): ICD-10-CM

## 2025-04-04 LAB
ALBUMIN SERPL BCP-MCNC: 4.4 G/DL (ref 3.4–5)
ALP SERPL-CCNC: 82 U/L (ref 33–136)
ALT SERPL W P-5'-P-CCNC: 21 U/L (ref 10–52)
ANION GAP SERPL CALC-SCNC: 15 MMOL/L (ref 10–20)
APPEARANCE UR: CLEAR
AST SERPL W P-5'-P-CCNC: 18 U/L (ref 9–39)
BASOPHILS # BLD AUTO: 0.01 X10*3/UL (ref 0–0.1)
BASOPHILS NFR BLD AUTO: 0.1 %
BILIRUB SERPL-MCNC: 0.7 MG/DL (ref 0–1.2)
BILIRUB UR STRIP.AUTO-MCNC: NEGATIVE MG/DL
BUN SERPL-MCNC: 19 MG/DL (ref 6–23)
CALCIUM SERPL-MCNC: 9.8 MG/DL (ref 8.6–10.6)
CHLORIDE SERPL-SCNC: 104 MMOL/L (ref 98–107)
CO2 SERPL-SCNC: 23 MMOL/L (ref 21–32)
COLOR UR: YELLOW
CORTIS AM PEAK SERPL-MSCNC: 14.3 UG/DL (ref 5–20)
CREAT SERPL-MCNC: 1.02 MG/DL (ref 0.5–1.3)
EGFRCR SERPLBLD CKD-EPI 2021: 81 ML/MIN/1.73M*2
EOSINOPHIL # BLD AUTO: 0.14 X10*3/UL (ref 0–0.7)
EOSINOPHIL NFR BLD AUTO: 2 %
ERYTHROCYTE [DISTWIDTH] IN BLOOD BY AUTOMATED COUNT: 13.2 % (ref 11.5–14.5)
GLUCOSE SERPL-MCNC: 112 MG/DL (ref 74–99)
GLUCOSE UR STRIP.AUTO-MCNC: NEGATIVE MG/DL
HCT VFR BLD AUTO: 46.6 % (ref 41–52)
HGB BLD-MCNC: 15.6 G/DL (ref 13.5–17.5)
IMM GRANULOCYTES # BLD AUTO: 0.07 X10*3/UL (ref 0–0.7)
IMM GRANULOCYTES NFR BLD AUTO: 1 % (ref 0–0.9)
KETONES UR STRIP.AUTO-MCNC: NEGATIVE MG/DL
LEUKOCYTE ESTERASE UR QL STRIP.AUTO: NEGATIVE
LYMPHOCYTES # BLD AUTO: 0.72 X10*3/UL (ref 1.2–4.8)
LYMPHOCYTES NFR BLD AUTO: 10.1 %
MCH RBC QN AUTO: 33.6 PG (ref 26–34)
MCHC RBC AUTO-ENTMCNC: 33.5 G/DL (ref 32–36)
MCV RBC AUTO: 100 FL (ref 80–100)
MONOCYTES # BLD AUTO: 0.75 X10*3/UL (ref 0.1–1)
MONOCYTES NFR BLD AUTO: 10.5 %
NEUTROPHILS # BLD AUTO: 5.47 X10*3/UL (ref 1.2–7.7)
NEUTROPHILS NFR BLD AUTO: 76.3 %
NITRITE UR QL STRIP.AUTO: NEGATIVE
NRBC BLD-RTO: ABNORMAL /100{WBCS}
PH UR STRIP.AUTO: 6 [PH]
PLATELET # BLD AUTO: 148 X10*3/UL (ref 150–450)
POTASSIUM SERPL-SCNC: 4.6 MMOL/L (ref 3.5–5.3)
PROT SERPL-MCNC: 7.4 G/DL (ref 6.4–8.2)
PROT UR STRIP.AUTO-MCNC: NEGATIVE MG/DL
RBC # BLD AUTO: 4.64 X10*6/UL (ref 4.5–5.9)
RBC # UR STRIP.AUTO: NEGATIVE MG/DL
SODIUM SERPL-SCNC: 137 MMOL/L (ref 136–145)
SP GR UR STRIP.AUTO: 1.01
TSH SERPL-ACNC: 1.13 MIU/L (ref 0.44–3.98)
UROBILINOGEN UR STRIP.AUTO-MCNC: 0.2 MG/DL
WBC # BLD AUTO: 7.2 X10*3/UL (ref 4.4–11.3)

## 2025-04-04 PROCEDURE — 82024 ASSAY OF ACTH: CPT

## 2025-04-04 PROCEDURE — 99205 OFFICE O/P NEW HI 60 MIN: CPT | Performed by: INTERNAL MEDICINE

## 2025-04-04 PROCEDURE — 82105 ALPHA-FETOPROTEIN SERUM: CPT

## 2025-04-04 PROCEDURE — 99215 OFFICE O/P EST HI 40 MIN: CPT | Mod: 25 | Performed by: INTERNAL MEDICINE

## 2025-04-04 PROCEDURE — 1126F AMNT PAIN NOTED NONE PRSNT: CPT | Performed by: INTERNAL MEDICINE

## 2025-04-04 PROCEDURE — 1159F MED LIST DOCD IN RCRD: CPT | Performed by: INTERNAL MEDICINE

## 2025-04-04 PROCEDURE — 3078F DIAST BP <80 MM HG: CPT | Performed by: INTERNAL MEDICINE

## 2025-04-04 PROCEDURE — 89240 UNLISTED MISC PATH TEST: CPT

## 2025-04-04 PROCEDURE — 82533 TOTAL CORTISOL: CPT

## 2025-04-04 PROCEDURE — 85025 COMPLETE CBC W/AUTO DIFF WBC: CPT

## 2025-04-04 PROCEDURE — 3008F BODY MASS INDEX DOCD: CPT | Performed by: INTERNAL MEDICINE

## 2025-04-04 PROCEDURE — 84443 ASSAY THYROID STIM HORMONE: CPT

## 2025-04-04 PROCEDURE — 81003 URINALYSIS AUTO W/O SCOPE: CPT

## 2025-04-04 PROCEDURE — 3074F SYST BP LT 130 MM HG: CPT | Performed by: INTERNAL MEDICINE

## 2025-04-04 PROCEDURE — 4010F ACE/ARB THERAPY RXD/TAKEN: CPT | Performed by: INTERNAL MEDICINE

## 2025-04-04 PROCEDURE — 80053 COMPREHEN METABOLIC PANEL: CPT

## 2025-04-04 PROCEDURE — 36415 COLL VENOUS BLD VENIPUNCTURE: CPT

## 2025-04-04 RX ORDER — FAMOTIDINE 10 MG/ML
20 INJECTION, SOLUTION INTRAVENOUS ONCE AS NEEDED
OUTPATIENT
Start: 2025-04-29

## 2025-04-04 RX ORDER — PROCHLORPERAZINE EDISYLATE 5 MG/ML
10 INJECTION INTRAMUSCULAR; INTRAVENOUS EVERY 6 HOURS PRN
OUTPATIENT
Start: 2025-04-29

## 2025-04-04 RX ORDER — LANOLIN ALCOHOL/MO/W.PET/CERES
1000 CREAM (GRAM) TOPICAL DAILY
COMMUNITY

## 2025-04-04 RX ORDER — HEPARIN SODIUM,PORCINE/PF 10 UNIT/ML
50 SYRINGE (ML) INTRAVENOUS AS NEEDED
OUTPATIENT
Start: 2025-04-04

## 2025-04-04 RX ORDER — LORAZEPAM 0.5 MG/1
0.5 TABLET ORAL AS NEEDED
COMMUNITY

## 2025-04-04 RX ORDER — DIPHENHYDRAMINE HYDROCHLORIDE 50 MG/ML
50 INJECTION, SOLUTION INTRAMUSCULAR; INTRAVENOUS AS NEEDED
OUTPATIENT
Start: 2025-04-29

## 2025-04-04 RX ORDER — EPINEPHRINE 0.3 MG/.3ML
0.3 INJECTION SUBCUTANEOUS EVERY 5 MIN PRN
OUTPATIENT
Start: 2025-04-29

## 2025-04-04 RX ORDER — PROCHLORPERAZINE MALEATE 10 MG
10 TABLET ORAL EVERY 6 HOURS PRN
Qty: 30 TABLET | Refills: 5 | Status: SHIPPED | OUTPATIENT
Start: 2025-04-04

## 2025-04-04 RX ORDER — LOPERAMIDE HYDROCHLORIDE 2 MG/1
CAPSULE ORAL
Qty: 100 CAPSULE | Refills: 2 | Status: SHIPPED | OUTPATIENT
Start: 2025-04-04

## 2025-04-04 RX ORDER — HEPARIN 100 UNIT/ML
500 SYRINGE INTRAVENOUS AS NEEDED
OUTPATIENT
Start: 2025-04-04

## 2025-04-04 RX ORDER — PROCHLORPERAZINE MALEATE 10 MG
10 TABLET ORAL EVERY 6 HOURS PRN
OUTPATIENT
Start: 2025-04-29

## 2025-04-04 RX ORDER — ALBUTEROL SULFATE 0.83 MG/ML
3 SOLUTION RESPIRATORY (INHALATION) AS NEEDED
OUTPATIENT
Start: 2025-04-29

## 2025-04-04 SDOH — ECONOMIC STABILITY: TRANSPORTATION INSECURITY
IN THE PAST 12 MONTHS, HAS LACK OF TRANSPORTATION KEPT YOU FROM MEETINGS, WORK, OR FROM GETTING THINGS NEEDED FOR DAILY LIVING?: PATIENT DECLINED

## 2025-04-04 SDOH — ECONOMIC STABILITY: GENERAL
WHICH OF THE FOLLOWING WOULD YOU LIKE TO GET CONNECTED TO IN ORDER TO RECEIVE A DISCOUNT OR FOR FREE? (CHOOSE ALL THAT APPLY): NO ASSISTANCE NEEDED

## 2025-04-04 SDOH — ECONOMIC STABILITY: FOOD INSECURITY: WITHIN THE PAST 12 MONTHS, YOU WORRIED THAT YOUR FOOD WOULD RUN OUT BEFORE YOU GOT MONEY TO BUY MORE.: PATIENT DECLINED

## 2025-04-04 SDOH — ECONOMIC STABILITY: FOOD INSECURITY: WITHIN THE PAST 12 MONTHS, THE FOOD YOU BOUGHT JUST DIDN'T LAST AND YOU DIDN'T HAVE MONEY TO GET MORE.: PATIENT DECLINED

## 2025-04-04 SDOH — ECONOMIC STABILITY: TRANSPORTATION INSECURITY
IN THE PAST 12 MONTHS, HAS THE LACK OF TRANSPORTATION KEPT YOU FROM MEDICAL APPOINTMENTS OR FROM GETTING MEDICATIONS?: PATIENT DECLINED

## 2025-04-04 SDOH — ECONOMIC STABILITY: INCOME INSECURITY: IN THE LAST 12 MONTHS, WAS THERE A TIME WHEN YOU WERE NOT ABLE TO PAY THE MORTGAGE OR RENT ON TIME?: PATIENT DECLINED

## 2025-04-04 SDOH — HEALTH STABILITY: PHYSICAL HEALTH
ON AVERAGE, HOW MANY DAYS PER WEEK DO YOU ENGAGE IN MODERATE TO STRENUOUS EXERCISE (LIKE A BRISK WALK)?: PATIENT DECLINED

## 2025-04-04 SDOH — ECONOMIC STABILITY: GENERAL
WHICH OF THE FOLLOWING DO YOU KNOW HOW TO USE AND HAVE ACCESS TO EVERY DAY? (CHOOSE ALL THAT APPLY): DESKTOP COMPUTER, LAPTOP COMPUTER, OR TABLET WITH BROADBAND INTERNET CONNECTION

## 2025-04-04 SDOH — HEALTH STABILITY: PHYSICAL HEALTH: ON AVERAGE, HOW MANY MINUTES DO YOU ENGAGE IN EXERCISE AT THIS LEVEL?: PATIENT DECLINED

## 2025-04-04 ASSESSMENT — SOCIAL DETERMINANTS OF HEALTH (SDOH)
HOW OFTEN DO YOU ATTENT MEETINGS OF THE CLUB OR ORGANIZATION YOU BELONG TO?: PATIENT DECLINED
IN A TYPICAL WEEK, HOW MANY TIMES DO YOU TALK ON THE PHONE WITH FAMILY, FRIENDS, OR NEIGHBORS?: PATIENT DECLINED
HOW OFTEN DO YOU GET TOGETHER WITH FRIENDS OR RELATIVES?: PATIENT DECLINED
WITHIN THE LAST YEAR, HAVE YOU BEEN KICKED, HIT, SLAPPED, OR OTHERWISE PHYSICALLY HURT BY YOUR PARTNER OR EX-PARTNER?: PATIENT DECLINED
HOW HARD IS IT FOR YOU TO PAY FOR THE VERY BASICS LIKE FOOD, HOUSING, MEDICAL CARE, AND HEATING?: PATIENT DECLINED
IN THE PAST 12 MONTHS, HAS THE ELECTRIC, GAS, OIL, OR WATER COMPANY THREATENED TO SHUT OFF SERVICE IN YOUR HOME?: PATIENT DECLINED
WITHIN THE LAST YEAR, HAVE YOU BEEN AFRAID OF YOUR PARTNER OR EX-PARTNER?: PATIENT DECLINED
DO YOU BELONG TO ANY CLUBS OR ORGANIZATIONS SUCH AS CHURCH GROUPS UNIONS, FRATERNAL OR ATHLETIC GROUPS, OR SCHOOL GROUPS?: PATIENT DECLINED
ARE YOU MARRIED, WIDOWED, DIVORCED, SEPARATED, NEVER MARRIED, OR LIVING WITH A PARTNER?: PATIENT DECLINED
WITHIN THE LAST YEAR, HAVE TO BEEN RAPED OR FORCED TO HAVE ANY KIND OF SEXUAL ACTIVITY BY YOUR PARTNER OR EX-PARTNER?: PATIENT DECLINED
WITHIN THE LAST YEAR, HAVE YOU BEEN HUMILIATED OR EMOTIONALLY ABUSED IN OTHER WAYS BY YOUR PARTNER OR EX-PARTNER?: PATIENT DECLINED
HOW OFTEN DO YOU ATTEND CHURCH OR RELIGIOUS SERVICES?: PATIENT DECLINED

## 2025-04-04 ASSESSMENT — ENCOUNTER SYMPTOMS
CHILLS: 0
FEVER: 0
MUSCULOSKELETAL NEGATIVE: 1
NEUROLOGICAL NEGATIVE: 1
ABDOMINAL PAIN: 0
CARDIOVASCULAR NEGATIVE: 1
ENDOCRINE NEGATIVE: 1
RESPIRATORY NEGATIVE: 1
PSYCHIATRIC NEGATIVE: 1
HEMATOLOGIC/LYMPHATIC NEGATIVE: 1
EYES NEGATIVE: 1
VOMITING: 0
NAUSEA: 0

## 2025-04-04 ASSESSMENT — PATIENT HEALTH QUESTIONNAIRE - PHQ9
10. IF YOU CHECKED OFF ANY PROBLEMS, HOW DIFFICULT HAVE THESE PROBLEMS MADE IT FOR YOU TO DO YOUR WORK, TAKE CARE OF THINGS AT HOME, OR GET ALONG WITH OTHER PEOPLE: NOT DIFFICULT AT ALL
2. FEELING DOWN, DEPRESSED OR HOPELESS: NOT AT ALL
SUM OF ALL RESPONSES TO PHQ9 QUESTIONS 1 & 2: 1
1. LITTLE INTEREST OR PLEASURE IN DOING THINGS: SEVERAL DAYS

## 2025-04-04 ASSESSMENT — LIFESTYLE VARIABLES
HOW OFTEN DO YOU HAVE A DRINK CONTAINING ALCOHOL: 4 OR MORE TIMES A WEEK
HOW OFTEN DO YOU HAVE SIX OR MORE DRINKS ON ONE OCCASION: LESS THAN MONTHLY
SKIP TO QUESTIONS 9-10: 0
HOW MANY STANDARD DRINKS CONTAINING ALCOHOL DO YOU HAVE ON A TYPICAL DAY: 3 OR 4
AUDIT-C TOTAL SCORE: 6

## 2025-04-04 ASSESSMENT — PAIN SCALES - GENERAL: PAINLEVEL_OUTOF10: 0-NO PAIN

## 2025-04-04 NOTE — PROGRESS NOTES
Patient ID: Hemant Ness is a 64 y.o. male.  Date of Service: 04/04/25  Referring Physician: Tien Park MD  [id number]  Department [place] [address]  Primary Care Provider: Jose Juan Mukherjee DO      Subjective    Oncology History   HCC (hepatocellular carcinoma)   6/2024 Initial Diagnosis    Hepatocellular carcinoma  History of alcohol use and hepatitis C and cirrhosis was found to have 6.6 cm lesion in the left hepatic lobe.  Biopsy of the liver lesion on 6/17/2024 was consistent with moderately differentiated hepatocellular cancer in the background of cirrhosis.  aFP was 35 on 6/11/2024 9/16/2024 Procedure    Y90 radioembolization to segment 8 of liver     10/29/2024 Procedure    Y90 radioembolization of the posterior branch of right hepatic artery     2/19/2025 Recurrence    MRI liver showed multifocal bilobar liver lesions with largest measuring 2.8 cm in segment 3         HPI  Patient is here to discuss further treatment options.  He is feeling well overall.    Review of Systems   Constitutional:  Negative for chills and fever.   HENT:  Negative.     Eyes: Negative.    Respiratory: Negative.     Cardiovascular: Negative.    Gastrointestinal:  Negative for abdominal pain, nausea and vomiting.   Endocrine: Negative.    Genitourinary: Negative.     Musculoskeletal: Negative.    Skin: Negative.    Neurological: Negative.    Hematological: Negative.    Psychiatric/Behavioral: Negative.     All other systems reviewed and are negative.         Patient Active Problem List   Diagnosis    Type 2 diabetes mellitus with hyperglycemia, without long-term current use of insulin    Actinic keratoses    BPH with elevated PSA    CAD (coronary artery disease)    Current smoker    Dyslipidemia associated with type 2 diabetes mellitus    Hypertensive heart disease with chronic diastolic congestive heart failure    Sleep-disordered breathing    Uncomplicated alcohol dependence  (Multi)    Diabetes mellitus type 2 with peripheral artery disease    Cirrhosis of liver (Multi)    Situational anxiety    Adenomatous polyp of ascending colon    Acute cervical radiculopathy    HCC (hepatocellular carcinoma)    Hepatitis A and hepatitis B vaccine administered    Class 2 severe obesity due to excess calories with serious comorbidity and body mass index (BMI) of 35.0 to 35.9 in adult     Past Medical History:   Diagnosis Date    Chronic hepatitis C without hepatic coma (Multi) 04/15/2024    Cirrhosis (Multi)     Coronary artery disease     Diabetes mellitus (Multi)     Hepatitis C     Hyperlipidemia, unspecified 12/17/2021    Dyslipidemia    Hypertension     Personal history of other endocrine, nutritional and metabolic disease 12/17/2021    History of diabetes mellitus    Personal history of other endocrine, nutritional and metabolic disease 12/17/2021    History of type 2 diabetes mellitus    Personal history of other endocrine, nutritional and metabolic disease 04/08/2022    History of type 2 diabetes mellitus    Personal history of other specified conditions 09/10/2021    History of chest pain    STEMI (ST elevation myocardial infarction) (Multi) 09/07/2023     Past Surgical History:   Procedure Laterality Date    OTHER SURGICAL HISTORY  07/23/2021    Cardiac catheterization with stent placement    OTHER SURGICAL HISTORY  07/29/2021    Cyst excision    OTHER SURGICAL HISTORY  07/29/2021    Cholecystectomy    OTHER SURGICAL HISTORY  07/29/2021    Shoulder surgery     Family History   Family history unknown: Yes       Current Outpatient Medications:     aspirin 81 mg EC tablet, Take 1 tablet (81 mg) by mouth once daily., Disp: , Rfl:     dulaglutide (Trulicity) 4.5 mg/0.5 mL pen injector, INJECT ONE PEN (4.5 MG) UNDER THE SKIN ONCE WEEKLY, Disp: 2 mL, Rfl: 11    empagliflozin (Jardiance) 25 mg, TAKE 1 TABLET (25 MG) BY MOUTH ONCE DAILY., Disp: 90 tablet, Rfl: 3    gabapentin (Neurontin) 100 mg  "capsule, Take 1 capsule (100 mg) by mouth once daily at bedtime., Disp: 90 capsule, Rfl: 3    losartan (Cozaar) 100 mg tablet, TAKE ONE TABLET BY MOUTH once EVERY DAY, Disp: 90 tablet, Rfl: 0    metoprolol succinate XL (Toprol-XL) 100 mg 24 hr tablet, TAKE ONE TABLET BY MOUTH ONCE DAILY. DO NOT CRUSH OR CHEW, Disp: 90 tablet, Rfl: 3    nitroglycerin (Nitrostat) 0.4 mg SL tablet, Place under the tongue., Disp: , Rfl:     OneTouch Delica Plus Lancet 30 gauge misc, , Disp: , Rfl:     OneTouch Ultra Test strip, , Disp: , Rfl:     OneTouch Ultra2 Meter misc, , Disp: , Rfl:     rosuvastatin (Crestor) 10 mg tablet, Take 1 tablet (10 mg) by mouth once daily., Disp: 100 tablet, Rfl: 3    loperamide (Imodium) 2 mg capsule, Take 2 capsules (4 mg) by mouth with the first episode of diarrhea and 1 capsule (2 mg) by mouth with any additional episodes. Maximum 8 capsules (16 mg) per day., Disp: 100 capsule, Rfl: 2    prochlorperazine (Compazine) 10 mg tablet, Take 1 tablet (10 mg) by mouth every 6 hours if needed for nausea or vomiting., Disp: 30 tablet, Rfl: 5      Objective   BSA: 2.3 meters squared  /77   Pulse 88   Temp 36.8 °C (98.2 °F)   Resp 18   Ht (S) 1.75 m (5' 8.9\") Comment: cosigned 4/4 ER  Wt 109 kg (241 lb 3.2 oz)   SpO2 93%   BMI 35.72 kg/m²     Performance Status:  Asymptomatic    Physical Exam  Vitals reviewed.   Constitutional:       General: He is not in acute distress.     Appearance: Normal appearance.   HENT:      Head: Normocephalic and atraumatic.   Eyes:      Extraocular Movements: Extraocular movements intact.      Pupils: Pupils are equal, round, and reactive to light.   Cardiovascular:      Rate and Rhythm: Normal rate and regular rhythm.   Pulmonary:      Effort: Pulmonary effort is normal.      Breath sounds: Normal breath sounds.   Abdominal:      General: Bowel sounds are normal.      Tenderness: There is no abdominal tenderness.   Musculoskeletal:         General: Normal range of " motion.   Neurological:      General: No focal deficit present.      Mental Status: He is alert and oriented to person, place, and time.   Psychiatric:         Behavior: Behavior normal.         === 08/05/24 ===    CT ABDOMEN PELVIS ANGIOGRAM W AND/OR WO IV CONTRAST    - Impression -  1. Early enhancing confluent hepatic segment 8 lesion with early  washout on delayed phase imaging consistent with known LI-RADS 5  lesion better characterized on MRI imaging from 05/25/2024. There is  increase in size of the largest portion of the lesion when compared  to the MRI imaging within the differences in technique.  2. Conventional anatomy of the celiac trunk and arterial anatomy  supplying the hepatic parenchyma with feeding branches of the hepatic  lesion arising from left and right hepatic arteries.    3. Enlarged prostate gland with thickening of the urinary bladder  wall. Correlate with patient's symptoms of urinary retention.      I personally reviewed the images/study and I agree with the findings  as stated by Resident Salo Lutz MD.    MACRO:  None.    Signed by: Saul Weinstein 8/9/2024 6:23 AM  Dictation workstation:   [id number]    Assessment/Plan       #1 cirrhosis related to hepatitis C, treated and alcohol use  #2 multifocal HCC    Patient is a 64 y.o. male with cirrhosis related to treated hepatitis C and alcohol use.  Patient presented with multifocal HCC.  Patient has been treated with Y 90 x 2 in the past.  Unfortunately had recurrent multifocal disease.  Tumor board recommendation was to consider systemic therapy.    I discussed in detail with the patient and his wife the prognosis of follicular cancer.  We discussed first-line treatment options including atezolizumab plus bevacizumab, durvalumab plus durvalumab, sorafenib and lenvatinib.  Prolonged discussion patient decided to receive atezolizumab plus bevacizumab.  I also ordered EGD as well as restaging scans.  Patient will let us know if he does  not want to receive systemic therapy.  Otherwise plan we will tentatively begin treatment in 1 to 2 weeks.  NGS was ordered.  Patient will return prior to next cycle    Plan was discussed in detail with the patient and patient was in agreement with the plan of care.           Tai Ramon MD

## 2025-04-04 NOTE — PROGRESS NOTES
ONCOLOGY CLINICAL PHARMACY NOTE     Subjective  Hemant Ness is a 64 y.o. male with newly diagnosed Hepatocellular Carcinoma (HCC), here for clinical assessment and education.        Treatment history  Treatment Details   Treatment goal [No plan goal]   Plan Name Venous Access Orders   Status Active   Start Date 4/4/2025   End Date Until discontinued   Provider Tai Ramon MD   Chemotherapy [No matching medication found in this treatment plan]     Treatment Details   Treatment goal [No plan goal]   Plan Name Atezolizumab + Bevacizumab, 21 Day Cycles   Status Active   Start Date 4/15/2025 (Planned)   End Date 4/2/2026 (Planned)   Provider Tai Ramon MD   Chemotherapy methylPREDNISolone sod succinate (SOLU-Medrol) 40 mg/mL injection 40 mg, 40 mg, intravenous, As needed, 0 of 17 cycles    atezolizumab (Tecentriq) 1,200 mg in sodium chloride 0.9% 270 mL IV, 1,200 mg, intravenous, Once, 0 of 17 cycles    bevacizumab-awwb (Mvasi) 1,600 mg in sodium chloride 0.9% 164 mL IV, 15 mg/kg = 1,600 mg, intravenous, Once, 0 of 17 cycles          Objective  There were no vitals taken for this visit.  Lab Results   Component Value Date    WBC 6.8 10/21/2024    HGB 16.2 10/21/2024    HCT 47.3 10/21/2024     (H) 10/21/2024     10/21/2024      Lab Results   Component Value Date    GLUCOSE 124 (H) 02/26/2025    CALCIUM 9.9 02/26/2025     02/26/2025    K 4.6 02/26/2025    CO2 26 02/26/2025     02/26/2025    BUN 18 02/26/2025    CREATININE 1.03 02/26/2025     Lab Results   Component Value Date    ALT 24 02/26/2025    AST 27 02/26/2025    ALKPHOS 84 02/26/2025    BILITOT 0.6 02/26/2025       Allergies and Medications   Allergies   Allergen Reactions    Metformin Diarrhea       Current Outpatient Medications:     aspirin 81 mg EC tablet, Take 1 tablet (81 mg) by mouth once daily., Disp: , Rfl:     dulaglutide (Trulicity) 4.5 mg/0.5 mL pen injector, INJECT ONE PEN (4.5 MG) UNDER THE SKIN ONCE WEEKLY, Disp: 2  mL, Rfl: 11    empagliflozin (Jardiance) 25 mg, TAKE 1 TABLET (25 MG) BY MOUTH ONCE DAILY., Disp: 90 tablet, Rfl: 3    gabapentin (Neurontin) 100 mg capsule, Take 1 capsule (100 mg) by mouth once daily at bedtime., Disp: 90 capsule, Rfl: 3    loperamide (Imodium) 2 mg capsule, Take 2 capsules (4 mg) by mouth with the first episode of diarrhea and 1 capsule (2 mg) by mouth with any additional episodes. Maximum 8 capsules (16 mg) per day., Disp: 100 capsule, Rfl: 2    losartan (Cozaar) 100 mg tablet, TAKE ONE TABLET BY MOUTH once EVERY DAY, Disp: 90 tablet, Rfl: 0    metoprolol succinate XL (Toprol-XL) 100 mg 24 hr tablet, TAKE ONE TABLET BY MOUTH ONCE DAILY. DO NOT CRUSH OR CHEW, Disp: 90 tablet, Rfl: 3    nitroglycerin (Nitrostat) 0.4 mg SL tablet, Place under the tongue., Disp: , Rfl:     OneTouch Delica Plus Lancet 30 gauge misc, , Disp: , Rfl:     OneTouch Ultra Test strip, , Disp: , Rfl:     OneTouch Ultra2 Meter misc, , Disp: , Rfl:     prochlorperazine (Compazine) 10 mg tablet, Take 1 tablet (10 mg) by mouth every 6 hours if needed for nausea or vomiting., Disp: 30 tablet, Rfl: 5    rosuvastatin (Crestor) 10 mg tablet, Take 1 tablet (10 mg) by mouth once daily., Disp: 100 tablet, Rfl: 3    Assessment and Plan  Hemant Ness is a 64 y.o. male with liver cancer, to be treated with Atezolizumab/bevacizumab.    Chemotherapy  Review: Patient expressed urgency to finish initial visit and wants to process diagnosis and treatment recommendations alone with family.  Education by clinical pharmacist was not performed at this time.    Education: written handouts for both atezolizumab and bevacizumab provided to patient and wife in-office.  Contact information for office and clinical pharmacist direct line provided if questions/concerns would like to be addressed at a later time.  Confirmed no interactions with patients current medication list and to continue as prescribed.    Patient's wife called back later same day  to confirm why prescriptions were sent to [facility].  Left voicemail confirming prochlorperazine is only used as needed for nausea and loperamide is used as needed for diarrhea/loose stool.  These are standard supportive care prescriptions sent when initiating new medication for cancer treatment.  Patient does not need to pick-up or take from pharmacy if he does not want them.           Analy Carrasco, PharmD

## 2025-04-04 NOTE — PROGRESS NOTES
PRASHANT referred for new patient visit. Patient is a 64 year old  male recently diagnosed with hepatocellular cancer.  He is retired. Patient is under his wife's medical coverage and will enroll in Medicare when she retires this year.  Patient and patient are very communicative and pleasant. Patient will discuss with spouse about receiving systemic therapy, and will update physician if he doesn't want it. He plans on getting treatment at [place] because its closer to their home. For follow - up appointments he will come to [place] to see oncologist. PRASHANT went over medical insurances and will send contact information via My chart. PRASHANT provided contact information.

## 2025-04-04 NOTE — TELEPHONE ENCOUNTER
Patient is established with Dr. Pakr but Hem/Onc ordered EGD. Patient needs to be seen in Office.

## 2025-04-05 LAB — AFP SERPL-MCNC: 9 NG/ML (ref 0–9)

## 2025-04-07 LAB — ACTH PLAS-MCNC: 18.2 PG/ML (ref 7.2–63.3)

## 2025-04-17 LAB
DNA RANGE(S) EXAMINED NAR: NORMAL
GENE DIS ANL INTERP-IMP: POSITIVE
GENE DIS ASSESSED: NORMAL
GENE MUT TESTED BLD/T: 7.4 M/MB
MSI CA SPEC-IMP: NORMAL
REASON FOR STUDY: NORMAL
TEMPUS GERMLINE NOTE: NORMAL
TEMPUS LCA: NORMAL
TEMPUS PORTAL: NORMAL
TEMPUS TREATMENT IMPLICATIONS NOTE: NORMAL
TEMPUS TRIALCOUNT: 3
TEMPUS TRIALMATCHES1: NORMAL
TEMPUS TRIALMATCHES2: NORMAL
TEMPUS TRIALMATCHES3: NORMAL

## 2025-04-22 LAB
DNA RANGE(S) EXAMINED NAR: NORMAL
GENE DIS ANL INTERP-IMP: POSITIVE
GENE DIS ASSESSED: NORMAL
GENE MUT TESTED BLD/T: 7.4 M/MB
MSI CA SPEC-IMP: NORMAL
REASON FOR STUDY: NORMAL
TEMPUS GERMLINE NOTE: NORMAL
TEMPUS LCA: NORMAL
TEMPUS PORTAL: NORMAL
TEMPUS TREATMENT IMPLICATIONS NOTE: NORMAL
TEMPUS TRIALCOUNT: 3
TEMPUS TRIALMATCHES1: NORMAL
TEMPUS TRIALMATCHES2: NORMAL
TEMPUS TRIALMATCHES3: NORMAL
TEMPUS XR RESULT 1: NORMAL

## 2025-04-23 ENCOUNTER — HOSPITAL ENCOUNTER (OUTPATIENT)
Dept: RADIOLOGY | Facility: HOSPITAL | Age: 65
Discharge: HOME | End: 2025-04-23
Payer: COMMERCIAL

## 2025-04-23 DIAGNOSIS — C22.0 HCC (HEPATOCELLULAR CARCINOMA): ICD-10-CM

## 2025-04-23 PROCEDURE — 2550000001 HC RX 255 CONTRASTS: Mod: JZ | Performed by: INTERNAL MEDICINE

## 2025-04-23 PROCEDURE — 71250 CT THORAX DX C-: CPT

## 2025-04-23 PROCEDURE — 74160 CT ABDOMEN W/CONTRAST: CPT

## 2025-04-23 RX ADMIN — IOHEXOL 75 ML: 350 INJECTION, SOLUTION INTRAVENOUS at 14:06

## 2025-04-29 ENCOUNTER — INFUSION (OUTPATIENT)
Dept: HEMATOLOGY/ONCOLOGY | Facility: CLINIC | Age: 65
End: 2025-04-29
Payer: COMMERCIAL

## 2025-04-29 VITALS
HEART RATE: 91 BPM | TEMPERATURE: 97.7 F | DIASTOLIC BLOOD PRESSURE: 87 MMHG | WEIGHT: 244.9 LBS | RESPIRATION RATE: 16 BRPM | SYSTOLIC BLOOD PRESSURE: 154 MMHG | OXYGEN SATURATION: 98 % | HEIGHT: 69 IN | BODY MASS INDEX: 36.27 KG/M2

## 2025-04-29 DIAGNOSIS — C22.0 HCC (HEPATOCELLULAR CARCINOMA): ICD-10-CM

## 2025-04-29 LAB
ALBUMIN SERPL BCP-MCNC: 4.1 G/DL (ref 3.4–5)
ALP SERPL-CCNC: 65 U/L (ref 33–136)
ALT SERPL W P-5'-P-CCNC: 19 U/L (ref 10–52)
ANION GAP SERPL CALC-SCNC: 10 MMOL/L (ref 10–20)
AST SERPL W P-5'-P-CCNC: 21 U/L (ref 9–39)
BASOPHILS # BLD AUTO: 0.02 X10*3/UL (ref 0–0.1)
BASOPHILS NFR BLD AUTO: 0.3 %
BILIRUB SERPL-MCNC: 0.7 MG/DL (ref 0–1.2)
BUN SERPL-MCNC: 16 MG/DL (ref 6–23)
CALCIUM SERPL-MCNC: 9.2 MG/DL (ref 8.6–10.3)
CHLORIDE SERPL-SCNC: 104 MMOL/L (ref 98–107)
CO2 SERPL-SCNC: 26 MMOL/L (ref 21–32)
CREAT SERPL-MCNC: 1.06 MG/DL (ref 0.5–1.3)
EGFRCR SERPLBLD CKD-EPI 2021: 77 ML/MIN/1.73M*2
EOSINOPHIL # BLD AUTO: 0.08 X10*3/UL (ref 0–0.7)
EOSINOPHIL NFR BLD AUTO: 1.1 %
ERYTHROCYTE [DISTWIDTH] IN BLOOD BY AUTOMATED COUNT: 13.4 % (ref 11.5–14.5)
GLUCOSE SERPL-MCNC: 104 MG/DL (ref 74–99)
HCT VFR BLD AUTO: 43.6 % (ref 41–52)
HGB BLD-MCNC: 14.6 G/DL (ref 13.5–17.5)
IMM GRANULOCYTES # BLD AUTO: 0.07 X10*3/UL (ref 0–0.7)
IMM GRANULOCYTES NFR BLD AUTO: 1 % (ref 0–0.9)
LYMPHOCYTES # BLD AUTO: 0.76 X10*3/UL (ref 1.2–4.8)
LYMPHOCYTES NFR BLD AUTO: 10.6 %
MCH RBC QN AUTO: 33.1 PG (ref 26–34)
MCHC RBC AUTO-ENTMCNC: 33.5 G/DL (ref 32–36)
MCV RBC AUTO: 99 FL (ref 80–100)
MONOCYTES # BLD AUTO: 0.72 X10*3/UL (ref 0.1–1)
MONOCYTES NFR BLD AUTO: 10.1 %
NEUTROPHILS # BLD AUTO: 5.49 X10*3/UL (ref 1.2–7.7)
NEUTROPHILS NFR BLD AUTO: 76.9 %
NRBC BLD-RTO: 0 /100 WBCS (ref 0–0)
PLATELET # BLD AUTO: 147 X10*3/UL (ref 150–450)
POTASSIUM SERPL-SCNC: 4.4 MMOL/L (ref 3.5–5.3)
PROT SERPL-MCNC: 7.2 G/DL (ref 6.4–8.2)
RBC # BLD AUTO: 4.41 X10*6/UL (ref 4.5–5.9)
SCAN RESULT: NORMAL
SODIUM SERPL-SCNC: 136 MMOL/L (ref 136–145)
WBC # BLD AUTO: 7.1 X10*3/UL (ref 4.4–11.3)

## 2025-04-29 PROCEDURE — 96413 CHEMO IV INFUSION 1 HR: CPT

## 2025-04-29 PROCEDURE — 85025 COMPLETE CBC W/AUTO DIFF WBC: CPT

## 2025-04-29 PROCEDURE — 2500000004 HC RX 250 GENERAL PHARMACY W/ HCPCS (ALT 636 FOR OP/ED): Mod: JZ | Performed by: INTERNAL MEDICINE

## 2025-04-29 PROCEDURE — 96417 CHEMO IV INFUS EACH ADDL SEQ: CPT

## 2025-04-29 PROCEDURE — 80053 COMPREHEN METABOLIC PANEL: CPT

## 2025-04-29 RX ORDER — HEPARIN 100 UNIT/ML
500 SYRINGE INTRAVENOUS AS NEEDED
OUTPATIENT
Start: 2025-04-29

## 2025-04-29 RX ORDER — PROCHLORPERAZINE MALEATE 10 MG
10 TABLET ORAL EVERY 6 HOURS PRN
Status: DISCONTINUED | OUTPATIENT
Start: 2025-04-29 | End: 2025-04-29 | Stop reason: HOSPADM

## 2025-04-29 RX ORDER — HEPARIN SODIUM,PORCINE/PF 10 UNIT/ML
50 SYRINGE (ML) INTRAVENOUS AS NEEDED
OUTPATIENT
Start: 2025-04-29

## 2025-04-29 RX ORDER — EPINEPHRINE 0.3 MG/.3ML
0.3 INJECTION SUBCUTANEOUS EVERY 5 MIN PRN
Status: DISCONTINUED | OUTPATIENT
Start: 2025-04-29 | End: 2025-04-29 | Stop reason: HOSPADM

## 2025-04-29 RX ORDER — PROCHLORPERAZINE EDISYLATE 5 MG/ML
10 INJECTION INTRAMUSCULAR; INTRAVENOUS EVERY 6 HOURS PRN
Status: DISCONTINUED | OUTPATIENT
Start: 2025-04-29 | End: 2025-04-29 | Stop reason: HOSPADM

## 2025-04-29 RX ORDER — DIPHENHYDRAMINE HYDROCHLORIDE 50 MG/ML
50 INJECTION, SOLUTION INTRAMUSCULAR; INTRAVENOUS AS NEEDED
Status: DISCONTINUED | OUTPATIENT
Start: 2025-04-29 | End: 2025-04-29 | Stop reason: HOSPADM

## 2025-04-29 RX ORDER — ALBUTEROL SULFATE 0.83 MG/ML
3 SOLUTION RESPIRATORY (INHALATION) AS NEEDED
Status: DISCONTINUED | OUTPATIENT
Start: 2025-04-29 | End: 2025-04-29 | Stop reason: HOSPADM

## 2025-04-29 RX ORDER — FAMOTIDINE 10 MG/ML
20 INJECTION, SOLUTION INTRAVENOUS ONCE AS NEEDED
Status: DISCONTINUED | OUTPATIENT
Start: 2025-04-29 | End: 2025-04-29 | Stop reason: HOSPADM

## 2025-04-29 RX ADMIN — BEVACIZUMAB-AWWB 1600 MG: 400 INJECTION, SOLUTION INTRAVENOUS at 14:51

## 2025-04-29 RX ADMIN — ATEZOLIZUMAB 1200 MG: 1200 INJECTION, SOLUTION INTRAVENOUS at 13:40

## 2025-04-29 ASSESSMENT — PAIN SCALES - GENERAL: PAINLEVEL_OUTOF10: 0-NO PAIN

## 2025-04-29 NOTE — SIGNIFICANT EVENT
04/29/25 1248   Prechemo Checklist   Has the patient been in the hospital, ED, or urgent care since last date of service No   Chemo/Immuno Consent Completed and Signed Yes   Protocol/Indications Verified Yes   Confirmed to previous date/time of medication Yes  (first dose)   Compared to previous dose Yes  (First dose)   All medications are dated accurately Yes   Pregnancy Test Negative Not applicable   Parameters Met Yes  (ok to treat with CMP pending)   BSA/Weight-Height Verified Yes   Dose Calculations Verified (current, total, cumulative) Yes

## 2025-04-29 NOTE — PROGRESS NOTES
Pt here for first dose atezolizumab, bevacizumab. First dose education given. Tolerated well. He is aware of plan of care, will call with further questions or concerns. Will return in 3 weeks for next infusion

## 2025-05-14 RX ORDER — ALBUTEROL SULFATE 0.83 MG/ML
3 SOLUTION RESPIRATORY (INHALATION) AS NEEDED
OUTPATIENT
Start: 2025-05-20

## 2025-05-14 RX ORDER — FAMOTIDINE 10 MG/ML
20 INJECTION, SOLUTION INTRAVENOUS ONCE AS NEEDED
OUTPATIENT
Start: 2025-05-20

## 2025-05-14 RX ORDER — EPINEPHRINE 0.3 MG/.3ML
0.3 INJECTION SUBCUTANEOUS EVERY 5 MIN PRN
OUTPATIENT
Start: 2025-05-20

## 2025-05-14 RX ORDER — PROCHLORPERAZINE MALEATE 10 MG
10 TABLET ORAL EVERY 6 HOURS PRN
OUTPATIENT
Start: 2025-05-20

## 2025-05-14 RX ORDER — DIPHENHYDRAMINE HYDROCHLORIDE 50 MG/ML
50 INJECTION, SOLUTION INTRAMUSCULAR; INTRAVENOUS AS NEEDED
OUTPATIENT
Start: 2025-05-20

## 2025-05-14 RX ORDER — PROCHLORPERAZINE EDISYLATE 5 MG/ML
10 INJECTION INTRAMUSCULAR; INTRAVENOUS EVERY 6 HOURS PRN
OUTPATIENT
Start: 2025-05-20

## 2025-05-16 ENCOUNTER — LAB (OUTPATIENT)
Dept: LAB | Facility: CLINIC | Age: 65
End: 2025-05-16
Payer: COMMERCIAL

## 2025-05-16 ENCOUNTER — OFFICE VISIT (OUTPATIENT)
Dept: HEMATOLOGY/ONCOLOGY | Facility: CLINIC | Age: 65
End: 2025-05-16
Payer: COMMERCIAL

## 2025-05-16 VITALS
RESPIRATION RATE: 20 BRPM | DIASTOLIC BLOOD PRESSURE: 86 MMHG | OXYGEN SATURATION: 95 % | TEMPERATURE: 97.5 F | HEART RATE: 85 BPM | BODY MASS INDEX: 34.36 KG/M2 | WEIGHT: 232 LBS | SYSTOLIC BLOOD PRESSURE: 142 MMHG

## 2025-05-16 DIAGNOSIS — C22.0 HCC (HEPATOCELLULAR CARCINOMA): ICD-10-CM

## 2025-05-16 DIAGNOSIS — C22.0 HCC (HEPATOCELLULAR CARCINOMA): Primary | ICD-10-CM

## 2025-05-16 LAB
ALBUMIN SERPL BCP-MCNC: 4.2 G/DL (ref 3.4–5)
ALP SERPL-CCNC: 108 U/L (ref 33–136)
ALT SERPL W P-5'-P-CCNC: 33 U/L (ref 10–52)
ANION GAP SERPL CALC-SCNC: 15 MMOL/L (ref 10–20)
APPEARANCE UR: CLEAR
AST SERPL W P-5'-P-CCNC: 25 U/L (ref 9–39)
BASOPHILS # BLD AUTO: 0.01 X10*3/UL (ref 0–0.1)
BASOPHILS NFR BLD AUTO: 0.2 %
BILIRUB SERPL-MCNC: 0.6 MG/DL (ref 0–1.2)
BILIRUB UR STRIP.AUTO-MCNC: NEGATIVE MG/DL
BUN SERPL-MCNC: 13 MG/DL (ref 6–23)
CALCIUM SERPL-MCNC: 9.3 MG/DL (ref 8.6–10.6)
CHLORIDE SERPL-SCNC: 104 MMOL/L (ref 98–107)
CO2 SERPL-SCNC: 23 MMOL/L (ref 21–32)
COLOR UR: YELLOW
CREAT SERPL-MCNC: 1.17 MG/DL (ref 0.5–1.3)
EGFRCR SERPLBLD CKD-EPI 2021: 69 ML/MIN/1.73M*2
EOSINOPHIL # BLD AUTO: 0.09 X10*3/UL (ref 0–0.7)
EOSINOPHIL NFR BLD AUTO: 1.7 %
ERYTHROCYTE [DISTWIDTH] IN BLOOD BY AUTOMATED COUNT: 13.1 % (ref 11.5–14.5)
GLUCOSE SERPL-MCNC: 120 MG/DL (ref 74–99)
GLUCOSE UR STRIP.AUTO-MCNC: ABNORMAL MG/DL
HCT VFR BLD AUTO: 45.5 % (ref 41–52)
HGB BLD-MCNC: 15.1 G/DL (ref 13.5–17.5)
IMM GRANULOCYTES # BLD AUTO: 0.05 X10*3/UL (ref 0–0.7)
IMM GRANULOCYTES NFR BLD AUTO: 0.9 % (ref 0–0.9)
KETONES UR STRIP.AUTO-MCNC: NEGATIVE MG/DL
LEUKOCYTE ESTERASE UR QL STRIP.AUTO: ABNORMAL
LYMPHOCYTES # BLD AUTO: 0.61 X10*3/UL (ref 1.2–4.8)
LYMPHOCYTES NFR BLD AUTO: 11.5 %
MCH RBC QN AUTO: 32.9 PG (ref 26–34)
MCHC RBC AUTO-ENTMCNC: 33.2 G/DL (ref 32–36)
MCV RBC AUTO: 99 FL (ref 80–100)
MONOCYTES # BLD AUTO: 0.82 X10*3/UL (ref 0.1–1)
MONOCYTES NFR BLD AUTO: 15.4 %
MUCOUS THREADS #/AREA URNS AUTO: NORMAL /LPF
NEUTROPHILS # BLD AUTO: 3.74 X10*3/UL (ref 1.2–7.7)
NEUTROPHILS NFR BLD AUTO: 70.3 %
NITRITE UR QL STRIP.AUTO: NEGATIVE
NRBC BLD-RTO: ABNORMAL /100{WBCS}
PH UR STRIP.AUTO: 6 [PH]
PLATELET # BLD AUTO: 182 X10*3/UL (ref 150–450)
POTASSIUM SERPL-SCNC: 4.6 MMOL/L (ref 3.5–5.3)
PROT SERPL-MCNC: 7.4 G/DL (ref 6.4–8.2)
PROT UR STRIP.AUTO-MCNC: NEGATIVE MG/DL
RBC # BLD AUTO: 4.59 X10*6/UL (ref 4.5–5.9)
RBC # UR STRIP.AUTO: ABNORMAL MG/DL
RBC #/AREA URNS AUTO: NORMAL /HPF
SODIUM SERPL-SCNC: 137 MMOL/L (ref 136–145)
SP GR UR STRIP.AUTO: 1.01
SQUAMOUS #/AREA URNS AUTO: NORMAL /HPF
UROBILINOGEN UR STRIP.AUTO-MCNC: 1 MG/DL
WBC # BLD AUTO: 5.3 X10*3/UL (ref 4.4–11.3)
WBC #/AREA URNS AUTO: NORMAL /HPF

## 2025-05-16 PROCEDURE — 1159F MED LIST DOCD IN RCRD: CPT

## 2025-05-16 PROCEDURE — 36415 COLL VENOUS BLD VENIPUNCTURE: CPT

## 2025-05-16 PROCEDURE — 84075 ASSAY ALKALINE PHOSPHATASE: CPT

## 2025-05-16 PROCEDURE — 1160F RVW MEDS BY RX/DR IN RCRD: CPT

## 2025-05-16 PROCEDURE — 99215 OFFICE O/P EST HI 40 MIN: CPT

## 2025-05-16 PROCEDURE — 81001 URINALYSIS AUTO W/SCOPE: CPT

## 2025-05-16 PROCEDURE — 1126F AMNT PAIN NOTED NONE PRSNT: CPT

## 2025-05-16 PROCEDURE — 3079F DIAST BP 80-89 MM HG: CPT

## 2025-05-16 PROCEDURE — 85025 COMPLETE CBC W/AUTO DIFF WBC: CPT

## 2025-05-16 PROCEDURE — 3077F SYST BP >= 140 MM HG: CPT

## 2025-05-16 PROCEDURE — 4010F ACE/ARB THERAPY RXD/TAKEN: CPT

## 2025-05-16 ASSESSMENT — ENCOUNTER SYMPTOMS
HEMATOLOGIC/LYMPHATIC NEGATIVE: 1
ENDOCRINE NEGATIVE: 1
ABDOMINAL PAIN: 0
FEVER: 0
VOMITING: 0
EYES NEGATIVE: 1
RESPIRATORY NEGATIVE: 1
PSYCHIATRIC NEGATIVE: 1
NEUROLOGICAL NEGATIVE: 1
MUSCULOSKELETAL NEGATIVE: 1
CHILLS: 0
CARDIOVASCULAR NEGATIVE: 1
NAUSEA: 0

## 2025-05-16 ASSESSMENT — PAIN SCALES - GENERAL: PAINLEVEL_OUTOF10: 0-NO PAIN

## 2025-05-16 NOTE — PROGRESS NOTES
Patient seen in clinic today for fuv and scan review. He states cycle 1 went well with no side effects to report. He reports that he stopped his aspirin 81mg due to nosebleeds, but this was prior to starting chemotherapy and unrelated. Medications, allergies, Falls, Skin risk, nutrition assessment and treatment plan reviewed with patient.

## 2025-05-16 NOTE — PROGRESS NOTES
Patient ID: Hemant Ness is a 64 y.o. male.  Date of Service: 05/16/25  Referring Physician: Tai Ramon MD  [id number]  [place] [phone]  Primary Care Provider: Jose Juan Mukherjee DO      Subjective    Oncology History   HCC (hepatocellular carcinoma)   6/2024 Initial Diagnosis    Hepatocellular carcinoma  History of alcohol use and hepatitis C and cirrhosis was found to have 6.6 cm lesion in the left hepatic lobe.  Biopsy of the liver lesion on 6/17/2024 was consistent with moderately differentiated hepatocellular cancer in the background of cirrhosis.  aFP was 35 on 6/11/2024 9/16/2024 Procedure    Y90 radioembolization to segment 8 of liver     10/29/2024 Procedure    Y90 radioembolization of the posterior branch of right hepatic artery     2/19/2025 Recurrence    MRI liver showed multifocal bilobar liver lesions with largest measuring 2.8 cm in segment 3     4/29/2025 -  Chemotherapy    Atezolizumab + Bevacizumab, 21 Day Cycles         HPI  Mr. Ness returns in follow up today. Overall, he tolerated cycle 1 of Atezolizumab + Bevacizumab fairly well. Does report mild fatigue, but manageable. Appetite is decent. Did lose some weight, but he reports that he has been much more active outside. No issues with nausea or vomiting. Denies any new concerns today.     Review of Systems   Constitutional:  Negative for chills and fever.   HENT:  Negative.     Eyes: Negative.    Respiratory: Negative.     Cardiovascular: Negative.    Gastrointestinal:  Negative for abdominal pain, nausea and vomiting.   Endocrine: Negative.    Genitourinary: Negative.     Musculoskeletal: Negative.    Skin: Negative.    Neurological: Negative.    Hematological: Negative.    Psychiatric/Behavioral: Negative.     All other systems reviewed and are negative.         Patient Active Problem List   Diagnosis    Type 2 diabetes mellitus with hyperglycemia, without long-term current use of insulin    Actinic keratoses    BPH  with elevated PSA    CAD (coronary artery disease)    Current smoker    Dyslipidemia associated with type 2 diabetes mellitus    Hypertensive heart disease with chronic diastolic congestive heart failure    Sleep-disordered breathing    Uncomplicated alcohol dependence (Multi)    Diabetes mellitus type 2 with peripheral artery disease    Cirrhosis of liver (Multi)    Situational anxiety    Adenomatous polyp of ascending colon    Acute cervical radiculopathy    HCC (hepatocellular carcinoma)    Hepatitis A and hepatitis B vaccine administered    Class 2 severe obesity due to excess calories with serious comorbidity and body mass index (BMI) of 35.0 to 35.9 in adult     Past Medical History:   Diagnosis Date    Chronic hepatitis C without hepatic coma (Multi) 04/15/2024    Cirrhosis (Multi)     Coronary artery disease     Diabetes mellitus (Multi)     Hepatitis C     Hyperlipidemia, unspecified 12/17/2021    Dyslipidemia    Hypertension     Personal history of other endocrine, nutritional and metabolic disease 12/17/2021    History of diabetes mellitus    Personal history of other endocrine, nutritional and metabolic disease 12/17/2021    History of type 2 diabetes mellitus    Personal history of other endocrine, nutritional and metabolic disease 04/08/2022    History of type 2 diabetes mellitus    Personal history of other specified conditions 09/10/2021    History of chest pain    STEMI (ST elevation myocardial infarction) (Multi) 09/07/2023     Past Surgical History:   Procedure Laterality Date    OTHER SURGICAL HISTORY  07/23/2021    Cardiac catheterization with stent placement    OTHER SURGICAL HISTORY  07/29/2021    Cyst excision    OTHER SURGICAL HISTORY  07/29/2021    Cholecystectomy    OTHER SURGICAL HISTORY  07/29/2021    Shoulder surgery     Family History   Family history unknown: Yes       Current Outpatient Medications:     cyanocobalamin (Vitamin B-12) 1,000 mcg tablet, Take 1 tablet (1,000 mcg) by  mouth once daily., Disp: , Rfl:     dulaglutide (Trulicity) 4.5 mg/0.5 mL pen injector, INJECT ONE PEN (4.5 MG) UNDER THE SKIN ONCE WEEKLY, Disp: 2 mL, Rfl: 11    empagliflozin (Jardiance) 25 mg, TAKE 1 TABLET (25 MG) BY MOUTH ONCE DAILY., Disp: 90 tablet, Rfl: 3    gabapentin (Neurontin) 100 mg capsule, Take 1 capsule (100 mg) by mouth once daily at bedtime., Disp: 90 capsule, Rfl: 3    loperamide (Imodium) 2 mg capsule, Take 2 capsules (4 mg) by mouth with the first episode of diarrhea and 1 capsule (2 mg) by mouth with any additional episodes. Maximum 8 capsules (16 mg) per day., Disp: 100 capsule, Rfl: 2    LORazepam (Ativan) 0.5 mg tablet, Take 1 tablet (0.5 mg) by mouth if needed (for MRI testing (claustrophobia))., Disp: , Rfl:     losartan (Cozaar) 100 mg tablet, TAKE ONE TABLET BY MOUTH once EVERY DAY, Disp: 90 tablet, Rfl: 0    metoprolol succinate XL (Toprol-XL) 100 mg 24 hr tablet, TAKE ONE TABLET BY MOUTH ONCE DAILY. DO NOT CRUSH OR CHEW, Disp: 90 tablet, Rfl: 3    nitroglycerin (Nitrostat) 0.4 mg SL tablet, Place under the tongue., Disp: , Rfl:     OneTouch Delica Plus Lancet 30 gauge misc, , Disp: , Rfl:     OneTouch Ultra Test strip, , Disp: , Rfl:     OneTouch Ultra2 Meter misc, , Disp: , Rfl:     prochlorperazine (Compazine) 10 mg tablet, Take 1 tablet (10 mg) by mouth every 6 hours if needed for nausea or vomiting., Disp: 30 tablet, Rfl: 5    rosuvastatin (Crestor) 10 mg tablet, Take 1 tablet (10 mg) by mouth once daily., Disp: 100 tablet, Rfl: 3    aspirin 81 mg EC tablet, Take 1 tablet (81 mg) by mouth once daily. (Patient not taking: Reported on 5/16/2025), Disp: , Rfl:       Objective   BSA: 2.26 meters squared  /86 (BP Location: Left arm, Patient Position: Sitting, BP Cuff Size: Adult long)   Pulse 85   Temp 36.4 °C (97.5 °F) (Core)   Resp 20   Wt 105 kg (232 lb)   SpO2 95%   BMI 34.36 kg/m²     Performance Status:  Asymptomatic    Physical Exam  Vitals reviewed.   Constitutional:        General: He is not in acute distress.     Appearance: Normal appearance.   HENT:      Head: Normocephalic and atraumatic.   Eyes:      Extraocular Movements: Extraocular movements intact.      Pupils: Pupils are equal, round, and reactive to light.   Cardiovascular:      Rate and Rhythm: Normal rate and regular rhythm.   Pulmonary:      Effort: Pulmonary effort is normal.      Breath sounds: Normal breath sounds.   Abdominal:      General: Bowel sounds are normal.      Tenderness: There is no abdominal tenderness.   Musculoskeletal:         General: Normal range of motion.   Neurological:      General: No focal deficit present.      Mental Status: He is alert and oriented to person, place, and time.   Psychiatric:         Behavior: Behavior normal.       === 08/05/24 ===    CT ABDOMEN PELVIS ANGIOGRAM W AND/OR WO IV CONTRAST    - Impression -  1. Early enhancing confluent hepatic segment 8 lesion with early  washout on delayed phase imaging consistent with known LI-RADS 5  lesion better characterized on MRI imaging from 05/25/2024. There is  increase in size of the largest portion of the lesion when compared  to the MRI imaging within the differences in technique.  2. Conventional anatomy of the celiac trunk and arterial anatomy  supplying the hepatic parenchyma with feeding branches of the hepatic  lesion arising from left and right hepatic arteries.    3. Enlarged prostate gland with thickening of the urinary bladder  wall. Correlate with patient's symptoms of urinary retention.    Signed by: Saul Weinstein 8/9/2024 6:23 AM  Dictation workstation:   [id number]    Assessment/Plan       #1 cirrhosis related to hepatitis C, treated and alcohol use  #2 multifocal HCC    Patient is a 64 y.o. male with cirrhosis related to treated hepatitis C and alcohol use.  Patient presented with multifocal HCC. He has been treated with Y 90 x 2 in the past.  Unfortunately, he had recurrent multifocal disease.  Tumor board  recommendation was to consider systemic therapy.    We discussed in detail with the patient and his wife the prognosis of hepatocellular cancer.  We discussed first-line treatment options including atezolizumab plus bevacizumab, durvalumab plus durvalumab, sorafenib and lenvatinib.  After prolonged discussion, patient decided to receive atezolizumab plus bevacizumab.  We also ordered EGD as well as restaging scans. Mr. Ness officially started treatment 4/29/25.     Today, he returns prior to cycle 2 on Monday. He is tolerating treatment fairly well so far. We will proceed as planned on Monday. I asked patient to follow up with scheduling on his EGD as I explained it is needed to rule out esophageal varices due to him receiving bevacizumab and bleeding risks. He verbalized understanding of importance.     Plan was discussed in detail with the patient and patient was in agreement with the plan of care. Mr. Ness knows to call with any issues or concerns.            Ynes Garcia, APRN-CNP

## 2025-05-20 ENCOUNTER — INFUSION (OUTPATIENT)
Dept: HEMATOLOGY/ONCOLOGY | Facility: CLINIC | Age: 65
End: 2025-05-20
Payer: COMMERCIAL

## 2025-05-20 VITALS
HEART RATE: 87 BPM | OXYGEN SATURATION: 94 % | DIASTOLIC BLOOD PRESSURE: 89 MMHG | SYSTOLIC BLOOD PRESSURE: 132 MMHG | TEMPERATURE: 97.3 F | WEIGHT: 236.1 LBS | HEIGHT: 69 IN | RESPIRATION RATE: 16 BRPM | BODY MASS INDEX: 34.97 KG/M2

## 2025-05-20 DIAGNOSIS — C22.0 HCC (HEPATOCELLULAR CARCINOMA): ICD-10-CM

## 2025-05-20 PROCEDURE — 2500000004 HC RX 250 GENERAL PHARMACY W/ HCPCS (ALT 636 FOR OP/ED): Mod: JZ,TB

## 2025-05-20 PROCEDURE — 96417 CHEMO IV INFUS EACH ADDL SEQ: CPT

## 2025-05-20 PROCEDURE — 96413 CHEMO IV INFUSION 1 HR: CPT

## 2025-05-20 RX ORDER — FAMOTIDINE 10 MG/ML
20 INJECTION, SOLUTION INTRAVENOUS ONCE AS NEEDED
Status: DISCONTINUED | OUTPATIENT
Start: 2025-05-20 | End: 2025-05-20 | Stop reason: HOSPADM

## 2025-05-20 RX ORDER — PROCHLORPERAZINE EDISYLATE 5 MG/ML
10 INJECTION INTRAMUSCULAR; INTRAVENOUS EVERY 6 HOURS PRN
Status: DISCONTINUED | OUTPATIENT
Start: 2025-05-20 | End: 2025-05-20 | Stop reason: HOSPADM

## 2025-05-20 RX ORDER — ALBUTEROL SULFATE 0.83 MG/ML
3 SOLUTION RESPIRATORY (INHALATION) AS NEEDED
Status: DISCONTINUED | OUTPATIENT
Start: 2025-05-20 | End: 2025-05-20 | Stop reason: HOSPADM

## 2025-05-20 RX ORDER — PROCHLORPERAZINE MALEATE 10 MG
10 TABLET ORAL EVERY 6 HOURS PRN
Status: DISCONTINUED | OUTPATIENT
Start: 2025-05-20 | End: 2025-05-20 | Stop reason: HOSPADM

## 2025-05-20 RX ORDER — DIPHENHYDRAMINE HYDROCHLORIDE 50 MG/ML
50 INJECTION, SOLUTION INTRAMUSCULAR; INTRAVENOUS AS NEEDED
Status: DISCONTINUED | OUTPATIENT
Start: 2025-05-20 | End: 2025-05-20 | Stop reason: HOSPADM

## 2025-05-20 RX ORDER — EPINEPHRINE 0.3 MG/.3ML
0.3 INJECTION SUBCUTANEOUS EVERY 5 MIN PRN
Status: DISCONTINUED | OUTPATIENT
Start: 2025-05-20 | End: 2025-05-20 | Stop reason: HOSPADM

## 2025-05-20 RX ADMIN — BEVACIZUMAB-AWWB 1600 MG: 400 INJECTION, SOLUTION INTRAVENOUS at 11:53

## 2025-05-20 RX ADMIN — ATEZOLIZUMAB 1200 MG: 1200 INJECTION, SOLUTION INTRAVENOUS at 11:13

## 2025-05-20 ASSESSMENT — PAIN SCALES - GENERAL: PAINLEVEL_OUTOF10: 0-NO PAIN

## 2025-05-20 NOTE — PROGRESS NOTES
Patient here for atezolizumab/bevacizumab, tolerated well. Patient to return 06/10 for next treatment. Patient denies any questions at this time. Discharged in stable condition.

## 2025-05-22 ENCOUNTER — APPOINTMENT (OUTPATIENT)
Facility: CLINIC | Age: 65
End: 2025-05-22
Payer: COMMERCIAL

## 2025-05-22 ENCOUNTER — TELEPHONE (OUTPATIENT)
Facility: CLINIC | Age: 65
End: 2025-05-22

## 2025-05-22 VITALS
HEIGHT: 70 IN | OXYGEN SATURATION: 98 % | SYSTOLIC BLOOD PRESSURE: 132 MMHG | HEART RATE: 90 BPM | DIASTOLIC BLOOD PRESSURE: 80 MMHG | BODY MASS INDEX: 33.79 KG/M2 | WEIGHT: 236 LBS

## 2025-05-22 DIAGNOSIS — B18.2 HEPATIC CIRRHOSIS DUE TO CHRONIC HEPATITIS C INFECTION (MULTI): Primary | ICD-10-CM

## 2025-05-22 DIAGNOSIS — K74.60 HEPATIC CIRRHOSIS DUE TO CHRONIC HEPATITIS C INFECTION (MULTI): Primary | ICD-10-CM

## 2025-05-22 DIAGNOSIS — C22.0 HCC (HEPATOCELLULAR CARCINOMA): ICD-10-CM

## 2025-05-22 PROCEDURE — 3079F DIAST BP 80-89 MM HG: CPT | Performed by: INTERNAL MEDICINE

## 2025-05-22 PROCEDURE — 1159F MED LIST DOCD IN RCRD: CPT | Performed by: INTERNAL MEDICINE

## 2025-05-22 PROCEDURE — 4010F ACE/ARB THERAPY RXD/TAKEN: CPT | Performed by: INTERNAL MEDICINE

## 2025-05-22 PROCEDURE — 3008F BODY MASS INDEX DOCD: CPT | Performed by: INTERNAL MEDICINE

## 2025-05-22 PROCEDURE — 1125F AMNT PAIN NOTED PAIN PRSNT: CPT | Performed by: INTERNAL MEDICINE

## 2025-05-22 PROCEDURE — 99215 OFFICE O/P EST HI 40 MIN: CPT | Performed by: INTERNAL MEDICINE

## 2025-05-22 PROCEDURE — 3075F SYST BP GE 130 - 139MM HG: CPT | Performed by: INTERNAL MEDICINE

## 2025-05-22 ASSESSMENT — PAIN SCALES - GENERAL: PAINLEVEL_OUTOF10: 8

## 2025-05-22 NOTE — PATIENT INSTRUCTIONS
We will add 1 lab to your upcoming blood work.    Try your best to avoid all alcohol use and tobacco use.    Get an upper endoscopy.    See me back in clinic in 7 to 8 months.

## 2025-05-22 NOTE — H&P (VIEW-ONLY)
HEPATOLOGY RETURN PATIENT VISIT    May 22, 2025    Dr. Jose Juan Mukherjee       Patient Name:   HEMANT WOOD  Medical Record Number: [id number]  YOB: 1960    Dear Dr. Mukherjee,    I had the pleasure of seeing Hemant Wood (along with his wife) for follow-up evaluation in the [facility] ([facility] office). History and physical examination was performed. Pertinent available laboratory, imaging, pathology results were reviewed.  I spent 25 minutes reviewing his records in preparation for this visit.    History of Present Illness:   The patient is a 64 year old white male who is referred for follow-up evaluation of hepatitis C, cirrhosis and hepatocellular carcinoma.    The patient was undergoing routine testing and was noted to be hepatitis C positive.  He underwent a hepatitis C FibroSURE as well as an ultrasound which both were consistent with cirrhosis.  For these reasons, he was referred to me for further evaluation.    Prior to this, he had never been made aware of any liver issues or hepatitis.  Ironically, he was notified of his hepatitis C by the health department.  He reports that no one specifically called him from the medical office and it was not until the health department called him that he was made aware of it.    The patient denied any past history of acute hepatitis or jaundice.      He has never had any manifestations of liver disease including no jaundice, ascites, hepatic encephalopathy, or variceal bleeding.     His PCP submitted the order to get him started on Mavyret.  Yet, the patient was still referred to me for evaluation.  In fact, the patient started his Mavyret.  He was actually called by the specialty pharmacist to discuss the medication but she found out that he was already on the medication.  The patient has had no particular side effects and has been compliant with the medication.    He did actually complete his Mavyret therapy.    He  initially saw me in consultation on 5/9/2024.  At that time, I did additional evaluation (see results below).    After we initially saw him and discussed him at liver radiology tumor board, we felt that he had a 6.6 cm lesion in the right lobe of the liver which was not exactly classic for HCC or cholangiocarcinoma as well as a possible small satellite lesion.  The tumor board on 6/10/2024 recommended a biopsy of the lesion.  We did a biopsy of the right lobe liver mass.  It came back with moderately differentiated hepatocellular carcinoma in a background of cirrhosis.  We discussed him again with the liver radiology tumor board on 6/24/2024.  It was felt that the chest CT nodules were very tiny and not likely to be malignant.  He was seen by one of the transplant surgeons to see if he could potentially be a resection candidate.  Because the tumor was too large to consider transplant, it was recommended that he be referred to interventional radiology for local regional therapy.    He underwent right-sided Y90 radioembolization on 9/16/2024. He underwent right-sided Y90 radioembolization on 10/29/2024.    He was again discussed at liver radiology tumor board 2/24/2025.  The radiologist felt that the patient had more liver cancer which was distant from the original treatment site.  It was determined to be recurrent multifocal disease that would not be amenable to resection or transplant.  The committee recommended referring the patient for systemic therapy to medical oncology.  The patient is now being seen by medical oncology and they are determining the optimal systemic therapy for him.  They have started him on atezolizumab and bevacizumab.  They are still awaiting an EGD as part of the bevacizumab protocol.    He presented today for follow-up evaluation.  He denied any specific liver-related complaints.  He denied any abdominal pain.  He does get nausea about once a week.  His weight has been stable.  He denied  any GI bleeding.    Past Medical/Surgical History:   Type 2 diabetes mellitus with hyperglycemia, without long-term current use of insulin (Multi)  Actinic keratoses  Actinic skin damage  BPH with elevated PSA  CAD (coronary artery disease)  Current smoker  Dyslipidemia associated with type 2 diabetes mellitus (Multi)  Hypertensive heart disease with chronic diastolic congestive heart failure (Multi)  Sleep-disordered breathing  Uncomplicated alcohol dependence (Multi)  Diabetic polyneuropathy associated with type 2 diabetes mellitus (Multi)  Diabetes mellitus type 2 with peripheral artery disease (Multi)  Chronic hepatitis C without hepatic coma (Multi)  Cirrhosis  Hepatocellular carcinoma    Family History:   There is no known family history of liver disease.  There is no known family history of colon cancer.    Social History:   He is a smoker.  He does drink alcohol, about 1-2 shots a day.  He does have tattoos.  He has a remote history of intravenous drug use.  He has been incarcerated in the past.  He denied blood transfusions.  His wife tested negative for hepatitis C.  They do not have any children.    Review of systems: As noted above.  He does get some issues with intermittent constipation.  He does get some mild nausea about once a week.  He does have back and neck pain.  No fever, chills, weight loss, visual changes, auditory changes, shortness of breath, chest pain, abdominal pain, GI bleeding, diarrhea, depression, dysuria, hematuria, or rash.       Medical, Surgical, Family, and Social Histories  Medical History[1]    Surgical History[2]    Family History[3]    Social History     Socioeconomic History    Marital status:      Spouse name: Not on file    Number of children: Not on file    Years of education: Not on file    Highest education level: Not on file   Occupational History    Not on file   Tobacco Use    Smoking status: Every Day     Current packs/day: 0.50     Types: Cigarettes     Smokeless tobacco: Never   Vaping Use    Vaping status: Never Used   Substance and Sexual Activity    Alcohol use: Yes     Alcohol/week: 8.0 standard drinks of alcohol     Types: 8 Standard drinks or equivalent per week    Drug use: Yes     Types: Marijuana    Sexual activity: Yes   Other Topics Concern    Not on file   Social History Narrative    Not on file     Social Drivers of Health     Financial Resource Strain: Patient Declined (4/4/2025)    Overall Financial Resource Strain (CARDIA)     Difficulty of Paying Living Expenses: Patient declined   Food Insecurity: Patient Declined (4/4/2025)    Hunger Vital Sign     Worried About Running Out of Food in the Last Year: Patient declined     Ran Out of Food in the Last Year: Patient declined   Transportation Needs: Patient Declined (4/4/2025)    PRAPARE - Transportation     Lack of Transportation (Medical): Patient declined     Lack of Transportation (Non-Medical): Patient declined   Physical Activity: Patient Declined (4/4/2025)    Exercise Vital Sign     Days of Exercise per Week: Patient declined     Minutes of Exercise per Session: Patient declined   Stress: Patient Declined (4/4/2025)    [employer] [place] [employer] - Occupational Stress Questionnaire     Feeling of Stress : Patient declined   Social Connections: Patient Declined (4/4/2025)    Social Connection and Isolation Panel [NHANES]     Frequency of Communication with Friends and Family: Patient declined     Frequency of Social Gatherings with Friends and Family: Patient declined     Attends Yazidism Services: Patient declined     Active Member of Clubs or Organizations: Patient declined     Attends Club or Organization Meetings: Patient declined     Marital Status: Patient declined   Intimate Partner Violence: Patient Declined (4/4/2025)    Humiliation, Afraid, Rape, and Kick questionnaire     Fear of Current or Ex-Partner: Patient declined     Emotionally Abused: Patient declined      "Physically Abused: Patient declined     Sexually Abused: Patient declined   Housing Stability: Patient Declined (4/4/2025)    Housing Stability Vital Sign     Unable to Pay for Housing in the Last Year: Patient declined     Number of Times Moved in the Last Year: 0     Homeless in the Last Year: Patient declined       Allergies and Current Medications  Allergies[4]  Current Medications[5]     Physical Exam  /80   Pulse 90   Ht 1.778 m (5' 10\")   Wt 107 kg (236 lb)   SpO2 98%   BMI 33.86 kg/m²       Physical Examination:   General Appearance: alert and in no acute distress.   HEENT: oropharynx without lesions. Anicteric sclerae.   Neck supple, nontender, without adenopathy, thyromegaly, or JVD.   Lungs clear to auscultation and percussion.   Heart RRR without murmurs, rubs, or gallops.   Abdomen: Soft, nontender, bowel sounds positive, without obvious ascites. Liver and spleen not palpable.  He is overweight centrally.  Extremities full ROM, no atrophy, normal strength. No edema.  He does have Dupuytren's contractures on the right hand.  Skin does reveal several seborrheic keratoses.  He does have tattoos.    Neurological exam nonfocal, alert and oriented.  No asterixis.  No spider angiomata, or palmar erythema.     Labs 5/16/2025 WBC 5.3, hemoglobin 15.1, platelets 182, glucose 120, sodium 137, creatinine 1.17, protein 7.4, albumin 4.2, alk phos 108, bilirubin 0.6, AST 25, ALT 33.    Labs 4/29/2025 glucose 104, sodium 136, creatinine 1.06, protein 7.2, albumin 4.1, alk phos 65, bilirubin 0.7, AST 21, ALT 19, WBC 7.1, hemoglobin 14.6, platelets 147.    Labs 4/4/2025 AFP 9.    Labs 10/21/2024 INR 1.1.    Labs 6/11/2024 CA 19-9 was 35.8, CEA 3.8, hepatitis C RNA undetectable.    Labs 4/22/2024 AFP 14, hepatitis A antibody positive, hepatitis B core antibody negative, hepatitis B surface antigen negative, hepatitis B surface antibody negative, WBC 9.6, hemoglobin 17.4, , platelets 144, INR 1, glucose " 96, sodium 136, creatinine 1.04, protein 6.9, albumin 4.3, alk phos 68, bilirubin 1, AST 23, ALT 32, HCV FibroSURE showed stage 4 fibrosis and grade 1-2 activity.    Labs 3/4/2024 hepatitis C RNA 17,000,000 IU/ml, hepatitis C antibody positive, cholesterol 142, triglycerides 112, hemoglobin A1c 6.4%, AST 29, ALT 35.    Labs 7/22/2021 hemoglobin A1c 10.8%.    CT with contrast 4/23/2025:  IMPRESSION:  1.  Previously treated hepatic VIII mass is marginated by nodular  arterial enhancement with contrast washout suggesting residual viable  tumor. LR-TR viable.  2. Scattered arterial enhancing observations with contrast washout  have enlarged compared to prior exam 2 months ago. LI-RADS LR 5.  3. Most of the smallest arterial enhancing observations identified on  prior MRI are not well visualized by CT.  4. No new tumor thrombus.  5. No new lymphadenopathy. Enlarged common hepatic node is unchanged  since initial MRI nearly 1 year ago.  6. No new sites of metastatic disease in the abdomen or pelvis.    Chest CT without contrast 4/23/2025:  IMPRESSION:  1.  No thoracic metastatic disease identified.  2. Right lower lobe nodule seen previously has resolved. A few  additional nodules are stable and probably scars.    MRI 2/19/2025:  IMPRESSION:  1.  Status post interval Y 90 treatment with expected posttreatment  changes at the ablation zone, with prominent peripheral enhancement,  likely tumor related which could obscure residual disease, LR TR  equivocal.  2. Multifocal newly seen suspicious bilobar enhancing liver lesions  including LI-RADS 5 lesions and too small to characterize nonspecific  lesions.  3. Similar prominent hepatic lymph node.    CTA 8/5/2024:  IMPRESSION:  1. Early enhancing confluent hepatic segment 8 lesion with early  washout on delayed phase imaging consistent with known LI-RADS 5  lesion better characterized on MRI imaging from 05/25/2024. There is  increase in size of the largest portion of the  lesion when compared  to the MRI imaging within the differences in technique.  2. Conventional anatomy of the celiac trunk and arterial anatomy  supplying the hepatic parenchyma with feeding branches of the hepatic  lesion arising from left and right hepatic arteries.      3. Enlarged prostate gland with thickening of the urinary bladder  wall. Correlate with patient's symptoms of urinary retention.    Chest CT without contrast 6/13/2024:  IMPRESSION:  1.  Several subcentimeter lung nodules and nodular densities as  described for which short interval follow-up recommended. Mild patchy  infiltrative opacities in the left upper lobe may relate to  infectious/inflammatory process. Clinical correlation and follow-up  advised. Recommend short interval follow-up chest CT in 3 months or  sooner if clinically indicated.  2. Borderline mediastinal adenopathy as above. This could also be  further evaluated on short interval follow-up chest CT or PET-CT may  also be considered for further assessment.  3. Atherosclerotic vascular disease as above. Focal narrowed  appearance at the aortic arch distal to the origin of the left  subclavian artery as described, probably congenital.  4. Suggestion of vague hypoattenuating mass centrally in the liver  better evaluated on recent prior liver MRI dated 05/24/2024. Refer to  that report for additional details. Mild nodular hepatic contour can  be seen in the setting of cirrhosis.  5. Patchy sclerotic involvement T7 and superior aspect T8 vertebral  bodies with underlying metastatic involvement not excluded. Consider  bone scan or MRI for further assessment. Approximate 11 mm osseous  density or fragment abutting superior left clavicular head concerning  for fracture fragment of indeterminate age. There is suggestion of  possible mild regional soft tissue swelling. Correlate clinically and  follow-up as indicated.  6. Additional findings as above.      MRI 5/24/2024:  IMPRESSION:  1.   TI-RADS 5 liver lesion in the setting of potential cirrhosis  favors HCC versus combined HCC cholangiocarcinoma given the  asymmetric enhancement pattern. The overall extent of the abnormality  is estimated at up to 6.6 cm.  2. Mild focal dilation of the intrahepatic ducts likely related to  malignant obstruction.  3. The central liver lesion is contiguous with hepatic vein branch.  The hepatic veins and portal veins are grossly patent.  4. Mildly prominent portal hepatic region nodes are nonspecific.  Attention recommended at follow-up.      Ultrasound 4/17/2024:  IMPRESSION:  Cirrhosis with nonspecific 5 cm right hepatic mass. Consider further  characterization with liver MRI.    Colonoscopy 11/21/2022:  Impression:            - 16 medium polyps in the cecum, removed with a hot                          snare. Resected and retrieved.                         - One medium polyp in the sigmoid colon, removed with                          a hot snare. Resected and retrieved.                         - The examination was otherwise normal.    Pathology from colonoscopy 11/21/2022:  FINAL DIAGNOSIS   A.  POLYPS X16 (CECUM), EXCISIONAL BIOPSIES:        - ADENOMATOUS POLYPS (TUBULAR ADENOMAS).     B.  POLYP (SIGMOID COLON), EXCISIONAL BIOPSY:         - ADENOMATOUS POLYP (TUBULAR ADENOMA).     Liver mass biopsy 6/17/2024:  FINAL DIAGNOSIS   LIVER, RIGHT LOBE, MASS, NEEDLE-CORE BIOPSY:  - Moderately-differentiated hepatocellular carcinoma in a background of cirrhosis (see comment).             Assessment/Plan:     Hepatitis C, S/P Mavyret therapy  Cirrhosis  Multifocal hepatocellular carcinoma    The patient is referred to me for follow-up evaluation of the above issues.    I again discussed with this patient about hepatitis C. We talked about household spread. We talked about sexual spread. We talked about ways to avoid spreading the disease. I explained that sexual partners should be tested, and if negative, they should  make a decision about the use of condoms.  We also talked about avoiding sharing razor blades or toothbrushes with anyone. I also advised that alcohol use should be avoided.    He was treated with Mavyret by his PCP.  He did complete that therapy.  His hepatitis C RNA was undetectable in February 2025.  He is a thus a sustained virologic responder (SVR) and is considered cured of his infection.  This obviously does not mean that his cirrhosis was cured.    The other main issue has been the hepatocellular carcinoma.  As noted above, this was proven by liver biopsy.  It was originally too large to be considered for resection or transplant.  He was treated with local regional therapy to see if we could downstage the tumor.  Unfortunately, despite 2 Y90 sessions, the tumor progressed.  He was felt to have multifocal disease and has subsequently been referred to medical oncology for systemic therapy.  They will continue to follow him.  They have started him on atezolizumab and bevacizumab.    He is immune to hepatitis A.    He is not immune to hepatitis B.  He completed his hepatitis B vaccines.  I will check for immunity with his next labs.    With his cirrhosis and thrombocytopenia and the use of bevacizumab, he does need EGD to screen for varices.    Given the fact that he reportedly had 16 polyps in the cecum as well as a polyp in the sigmoid colon, all of which were tubular adenomas, I recommended that a simultaneous colonoscopy be repeated.  He is aware of this but at this point does not wish to do a colonoscopy.  He reports that he is quite overwhelmed by all of his medical issues and will consider this in the future.    He needs to avoid all alcohol use given his cirrhosis.  I also recommended that he try and stop tobacco use.    He can see me back in clinic in about 7 to 8 months.    Thank you for allowing me to participate in the care of this patient. Please feel free to contact me with any questions regarding  their care.     Sincerely,     Tien Park MD, NHUNG, FAASLD, FACG.   Medical Director, Hepatology  Senior Attending Physician  [facility] [place]  [facility]  Professor of Medicine  Division of Gastroenterology and Liver Disease  [facility] [employer]  Medicine  [address] [place] [id number]  Phone: [phone]  Fax: [phone].      Cc: Dr. Tai Ramon  cc: KELLEE Perdomo (oncology)      This document was generated with a computerized dictation system.  Because of this, there could be errors in grammar and/or content.         [1]   Past Medical History:  Diagnosis Date    Chronic hepatitis C without hepatic coma (Multi) 04/15/2024    Cirrhosis (Multi)     Coronary artery disease     Diabetes mellitus (Multi)     Hepatitis C     Hyperlipidemia, unspecified 12/17/2021    Dyslipidemia    Hypertension     Personal history of other endocrine, nutritional and metabolic disease 12/17/2021    History of diabetes mellitus    Personal history of other endocrine, nutritional and metabolic disease 12/17/2021    History of type 2 diabetes mellitus    Personal history of other endocrine, nutritional and metabolic disease 04/08/2022    History of type 2 diabetes mellitus    Personal history of other specified conditions 09/10/2021    History of chest pain    STEMI (ST elevation myocardial infarction) (Multi) 09/07/2023   [2]   Past Surgical History:  Procedure Laterality Date    OTHER SURGICAL HISTORY  07/23/2021    Cardiac catheterization with stent placement    OTHER SURGICAL HISTORY  07/29/2021    Cyst excision    OTHER SURGICAL HISTORY  07/29/2021    Cholecystectomy    OTHER SURGICAL HISTORY  07/29/2021    Shoulder surgery   [3]   Family History  Family history unknown: Yes   [4]   Allergies  Allergen Reactions    Metformin Diarrhea   [5]   Current Outpatient Medications   Medication Sig Dispense Refill    aspirin 81 mg EC  tablet Take 1 tablet (81 mg) by mouth once daily. (Patient not taking: Reported on 5/20/2025)      cyanocobalamin (Vitamin B-12) 1,000 mcg tablet Take 1 tablet (1,000 mcg) by mouth once daily.      dulaglutide (Trulicity) 4.5 mg/0.5 mL pen injector INJECT ONE PEN (4.5 MG) UNDER THE SKIN ONCE WEEKLY 2 mL 11    empagliflozin (Jardiance) 25 mg TAKE 1 TABLET (25 MG) BY MOUTH ONCE DAILY. 90 tablet 3    gabapentin (Neurontin) 100 mg capsule Take 1 capsule (100 mg) by mouth once daily at bedtime. 90 capsule 3    loperamide (Imodium) 2 mg capsule Take 2 capsules (4 mg) by mouth with the first episode of diarrhea and 1 capsule (2 mg) by mouth with any additional episodes. Maximum 8 capsules (16 mg) per day. 100 capsule 2    LORazepam (Ativan) 0.5 mg tablet Take 1 tablet (0.5 mg) by mouth if needed (for MRI testing (claustrophobia)).      losartan (Cozaar) 100 mg tablet TAKE ONE TABLET BY MOUTH once EVERY DAY 90 tablet 0    metoprolol succinate XL (Toprol-XL) 100 mg 24 hr tablet TAKE ONE TABLET BY MOUTH ONCE DAILY. DO NOT CRUSH OR CHEW 90 tablet 3    nitroglycerin (Nitrostat) 0.4 mg SL tablet Place under the tongue.      OneTouch Delica Plus Lancet 30 gauge misc       OneTouch Ultra Test strip       OneTouch Ultra2 Meter misc       prochlorperazine (Compazine) 10 mg tablet Take 1 tablet (10 mg) by mouth every 6 hours if needed for nausea or vomiting. 30 tablet 5    rosuvastatin (Crestor) 10 mg tablet Take 1 tablet (10 mg) by mouth once daily. 100 tablet 3     No current facility-administered medications for this visit.

## 2025-05-22 NOTE — PROGRESS NOTES
HEPATOLOGY RETURN PATIENT VISIT    May 22, 2025    Dr. Jose Juan Mukherjee       Patient Name:   HEMANT WOOD  Medical Record Number: [id number]  YOB: 1960    Dear Dr. Mukherjee,    I had the pleasure of seeing Hemant Wood (along with his wife) for follow-up evaluation in the [facility] ([facility] office). History and physical examination was performed. Pertinent available laboratory, imaging, pathology results were reviewed.  I spent 25 minutes reviewing his records in preparation for this visit.    History of Present Illness:   The patient is a 64 year old white male who is referred for follow-up evaluation of hepatitis C, cirrhosis and hepatocellular carcinoma.    The patient was undergoing routine testing and was noted to be hepatitis C positive.  He underwent a hepatitis C FibroSURE as well as an ultrasound which both were consistent with cirrhosis.  For these reasons, he was referred to me for further evaluation.    Prior to this, he had never been made aware of any liver issues or hepatitis.  Ironically, he was notified of his hepatitis C by the health department.  He reports that no one specifically called him from the medical office and it was not until the health department called him that he was made aware of it.    The patient denied any past history of acute hepatitis or jaundice.      He has never had any manifestations of liver disease including no jaundice, ascites, hepatic encephalopathy, or variceal bleeding.     His PCP submitted the order to get him started on Mavyret.  Yet, the patient was still referred to me for evaluation.  In fact, the patient started his Mavyret.  He was actually called by the specialty pharmacist to discuss the medication but she found out that he was already on the medication.  The patient has had no particular side effects and has been compliant with the medication.    He did actually complete his Mavyret therapy.    He  initially saw me in consultation on 5/9/2024.  At that time, I did additional evaluation (see results below).    After we initially saw him and discussed him at liver radiology tumor board, we felt that he had a 6.6 cm lesion in the right lobe of the liver which was not exactly classic for HCC or cholangiocarcinoma as well as a possible small satellite lesion.  The tumor board on 6/10/2024 recommended a biopsy of the lesion.  We did a biopsy of the right lobe liver mass.  It came back with moderately differentiated hepatocellular carcinoma in a background of cirrhosis.  We discussed him again with the liver radiology tumor board on 6/24/2024.  It was felt that the chest CT nodules were very tiny and not likely to be malignant.  He was seen by one of the transplant surgeons to see if he could potentially be a resection candidate.  Because the tumor was too large to consider transplant, it was recommended that he be referred to interventional radiology for local regional therapy.    He underwent right-sided Y90 radioembolization on 9/16/2024. He underwent right-sided Y90 radioembolization on 10/29/2024.    He was again discussed at liver radiology tumor board 2/24/2025.  The radiologist felt that the patient had more liver cancer which was distant from the original treatment site.  It was determined to be recurrent multifocal disease that would not be amenable to resection or transplant.  The committee recommended referring the patient for systemic therapy to medical oncology.  The patient is now being seen by medical oncology and they are determining the optimal systemic therapy for him.  They have started him on atezolizumab and bevacizumab.  They are still awaiting an EGD as part of the bevacizumab protocol.    He presented today for follow-up evaluation.  He denied any specific liver-related complaints.  He denied any abdominal pain.  He does get nausea about once a week.  His weight has been stable.  He denied  any GI bleeding.    Past Medical/Surgical History:   Type 2 diabetes mellitus with hyperglycemia, without long-term current use of insulin (Multi)  Actinic keratoses  Actinic skin damage  BPH with elevated PSA  CAD (coronary artery disease)  Current smoker  Dyslipidemia associated with type 2 diabetes mellitus (Multi)  Hypertensive heart disease with chronic diastolic congestive heart failure (Multi)  Sleep-disordered breathing  Uncomplicated alcohol dependence (Multi)  Diabetic polyneuropathy associated with type 2 diabetes mellitus (Multi)  Diabetes mellitus type 2 with peripheral artery disease (Multi)  Chronic hepatitis C without hepatic coma (Multi)  Cirrhosis  Hepatocellular carcinoma    Family History:   There is no known family history of liver disease.  There is no known family history of colon cancer.    Social History:   He is a smoker.  He does drink alcohol, about 1-2 shots a day.  He does have tattoos.  He has a remote history of intravenous drug use.  He has been incarcerated in the past.  He denied blood transfusions.  His wife tested negative for hepatitis C.  They do not have any children.    Review of systems: As noted above.  He does get some issues with intermittent constipation.  He does get some mild nausea about once a week.  He does have back and neck pain.  No fever, chills, weight loss, visual changes, auditory changes, shortness of breath, chest pain, abdominal pain, GI bleeding, diarrhea, depression, dysuria, hematuria, or rash.       Medical, Surgical, Family, and Social Histories  Medical History[1]    Surgical History[2]    Family History[3]    Social History     Socioeconomic History    Marital status:      Spouse name: Not on file    Number of children: Not on file    Years of education: Not on file    Highest education level: Not on file   Occupational History    Not on file   Tobacco Use    Smoking status: Every Day     Current packs/day: 0.50     Types: Cigarettes     Smokeless tobacco: Never   Vaping Use    Vaping status: Never Used   Substance and Sexual Activity    Alcohol use: Yes     Alcohol/week: 8.0 standard drinks of alcohol     Types: 8 Standard drinks or equivalent per week    Drug use: Yes     Types: Marijuana    Sexual activity: Yes   Other Topics Concern    Not on file   Social History Narrative    Not on file     Social Drivers of Health     Financial Resource Strain: Patient Declined (4/4/2025)    Overall Financial Resource Strain (CARDIA)     Difficulty of Paying Living Expenses: Patient declined   Food Insecurity: Patient Declined (4/4/2025)    Hunger Vital Sign     Worried About Running Out of Food in the Last Year: Patient declined     Ran Out of Food in the Last Year: Patient declined   Transportation Needs: Patient Declined (4/4/2025)    PRAPARE - Transportation     Lack of Transportation (Medical): Patient declined     Lack of Transportation (Non-Medical): Patient declined   Physical Activity: Patient Declined (4/4/2025)    Exercise Vital Sign     Days of Exercise per Week: Patient declined     Minutes of Exercise per Session: Patient declined   Stress: Patient Declined (4/4/2025)    [employer] [place] [employer] - Occupational Stress Questionnaire     Feeling of Stress : Patient declined   Social Connections: Patient Declined (4/4/2025)    Social Connection and Isolation Panel [NHANES]     Frequency of Communication with Friends and Family: Patient declined     Frequency of Social Gatherings with Friends and Family: Patient declined     Attends Anabaptist Services: Patient declined     Active Member of Clubs or Organizations: Patient declined     Attends Club or Organization Meetings: Patient declined     Marital Status: Patient declined   Intimate Partner Violence: Patient Declined (4/4/2025)    Humiliation, Afraid, Rape, and Kick questionnaire     Fear of Current or Ex-Partner: Patient declined     Emotionally Abused: Patient declined      "Physically Abused: Patient declined     Sexually Abused: Patient declined   Housing Stability: Patient Declined (4/4/2025)    Housing Stability Vital Sign     Unable to Pay for Housing in the Last Year: Patient declined     Number of Times Moved in the Last Year: 0     Homeless in the Last Year: Patient declined       Allergies and Current Medications  Allergies[4]  Current Medications[5]     Physical Exam  /80   Pulse 90   Ht 1.778 m (5' 10\")   Wt 107 kg (236 lb)   SpO2 98%   BMI 33.86 kg/m²       Physical Examination:   General Appearance: alert and in no acute distress.   HEENT: oropharynx without lesions. Anicteric sclerae.   Neck supple, nontender, without adenopathy, thyromegaly, or JVD.   Lungs clear to auscultation and percussion.   Heart RRR without murmurs, rubs, or gallops.   Abdomen: Soft, nontender, bowel sounds positive, without obvious ascites. Liver and spleen not palpable.  He is overweight centrally.  Extremities full ROM, no atrophy, normal strength. No edema.  He does have Dupuytren's contractures on the right hand.  Skin does reveal several seborrheic keratoses.  He does have tattoos.    Neurological exam nonfocal, alert and oriented.  No asterixis.  No spider angiomata, or palmar erythema.     Labs 5/16/2025 WBC 5.3, hemoglobin 15.1, platelets 182, glucose 120, sodium 137, creatinine 1.17, protein 7.4, albumin 4.2, alk phos 108, bilirubin 0.6, AST 25, ALT 33.    Labs 4/29/2025 glucose 104, sodium 136, creatinine 1.06, protein 7.2, albumin 4.1, alk phos 65, bilirubin 0.7, AST 21, ALT 19, WBC 7.1, hemoglobin 14.6, platelets 147.    Labs 4/4/2025 AFP 9.    Labs 10/21/2024 INR 1.1.    Labs 6/11/2024 CA 19-9 was 35.8, CEA 3.8, hepatitis C RNA undetectable.    Labs 4/22/2024 AFP 14, hepatitis A antibody positive, hepatitis B core antibody negative, hepatitis B surface antigen negative, hepatitis B surface antibody negative, WBC 9.6, hemoglobin 17.4, , platelets 144, INR 1, glucose " 96, sodium 136, creatinine 1.04, protein 6.9, albumin 4.3, alk phos 68, bilirubin 1, AST 23, ALT 32, HCV FibroSURE showed stage 4 fibrosis and grade 1-2 activity.    Labs 3/4/2024 hepatitis C RNA 17,000,000 IU/ml, hepatitis C antibody positive, cholesterol 142, triglycerides 112, hemoglobin A1c 6.4%, AST 29, ALT 35.    Labs 7/22/2021 hemoglobin A1c 10.8%.    CT with contrast 4/23/2025:  IMPRESSION:  1.  Previously treated hepatic VIII mass is marginated by nodular  arterial enhancement with contrast washout suggesting residual viable  tumor. LR-TR viable.  2. Scattered arterial enhancing observations with contrast washout  have enlarged compared to prior exam 2 months ago. LI-RADS LR 5.  3. Most of the smallest arterial enhancing observations identified on  prior MRI are not well visualized by CT.  4. No new tumor thrombus.  5. No new lymphadenopathy. Enlarged common hepatic node is unchanged  since initial MRI nearly 1 year ago.  6. No new sites of metastatic disease in the abdomen or pelvis.    Chest CT without contrast 4/23/2025:  IMPRESSION:  1.  No thoracic metastatic disease identified.  2. Right lower lobe nodule seen previously has resolved. A few  additional nodules are stable and probably scars.    MRI 2/19/2025:  IMPRESSION:  1.  Status post interval Y 90 treatment with expected posttreatment  changes at the ablation zone, with prominent peripheral enhancement,  likely tumor related which could obscure residual disease, LR TR  equivocal.  2. Multifocal newly seen suspicious bilobar enhancing liver lesions  including LI-RADS 5 lesions and too small to characterize nonspecific  lesions.  3. Similar prominent hepatic lymph node.    CTA 8/5/2024:  IMPRESSION:  1. Early enhancing confluent hepatic segment 8 lesion with early  washout on delayed phase imaging consistent with known LI-RADS 5  lesion better characterized on MRI imaging from 05/25/2024. There is  increase in size of the largest portion of the  lesion when compared  to the MRI imaging within the differences in technique.  2. Conventional anatomy of the celiac trunk and arterial anatomy  supplying the hepatic parenchyma with feeding branches of the hepatic  lesion arising from left and right hepatic arteries.      3. Enlarged prostate gland with thickening of the urinary bladder  wall. Correlate with patient's symptoms of urinary retention.    Chest CT without contrast 6/13/2024:  IMPRESSION:  1.  Several subcentimeter lung nodules and nodular densities as  described for which short interval follow-up recommended. Mild patchy  infiltrative opacities in the left upper lobe may relate to  infectious/inflammatory process. Clinical correlation and follow-up  advised. Recommend short interval follow-up chest CT in 3 months or  sooner if clinically indicated.  2. Borderline mediastinal adenopathy as above. This could also be  further evaluated on short interval follow-up chest CT or PET-CT may  also be considered for further assessment.  3. Atherosclerotic vascular disease as above. Focal narrowed  appearance at the aortic arch distal to the origin of the left  subclavian artery as described, probably congenital.  4. Suggestion of vague hypoattenuating mass centrally in the liver  better evaluated on recent prior liver MRI dated 05/24/2024. Refer to  that report for additional details. Mild nodular hepatic contour can  be seen in the setting of cirrhosis.  5. Patchy sclerotic involvement T7 and superior aspect T8 vertebral  bodies with underlying metastatic involvement not excluded. Consider  bone scan or MRI for further assessment. Approximate 11 mm osseous  density or fragment abutting superior left clavicular head concerning  for fracture fragment of indeterminate age. There is suggestion of  possible mild regional soft tissue swelling. Correlate clinically and  follow-up as indicated.  6. Additional findings as above.      MRI 5/24/2024:  IMPRESSION:  1.   TI-RADS 5 liver lesion in the setting of potential cirrhosis  favors HCC versus combined HCC cholangiocarcinoma given the  asymmetric enhancement pattern. The overall extent of the abnormality  is estimated at up to 6.6 cm.  2. Mild focal dilation of the intrahepatic ducts likely related to  malignant obstruction.  3. The central liver lesion is contiguous with hepatic vein branch.  The hepatic veins and portal veins are grossly patent.  4. Mildly prominent portal hepatic region nodes are nonspecific.  Attention recommended at follow-up.      Ultrasound 4/17/2024:  IMPRESSION:  Cirrhosis with nonspecific 5 cm right hepatic mass. Consider further  characterization with liver MRI.    Colonoscopy 11/21/2022:  Impression:            - 16 medium polyps in the cecum, removed with a hot                          snare. Resected and retrieved.                         - One medium polyp in the sigmoid colon, removed with                          a hot snare. Resected and retrieved.                         - The examination was otherwise normal.    Pathology from colonoscopy 11/21/2022:  FINAL DIAGNOSIS   A.  POLYPS X16 (CECUM), EXCISIONAL BIOPSIES:        - ADENOMATOUS POLYPS (TUBULAR ADENOMAS).     B.  POLYP (SIGMOID COLON), EXCISIONAL BIOPSY:         - ADENOMATOUS POLYP (TUBULAR ADENOMA).     Liver mass biopsy 6/17/2024:  FINAL DIAGNOSIS   LIVER, RIGHT LOBE, MASS, NEEDLE-CORE BIOPSY:  - Moderately-differentiated hepatocellular carcinoma in a background of cirrhosis (see comment).             Assessment/Plan:     Hepatitis C, S/P Mavyret therapy  Cirrhosis  Multifocal hepatocellular carcinoma    The patient is referred to me for follow-up evaluation of the above issues.    I again discussed with this patient about hepatitis C. We talked about household spread. We talked about sexual spread. We talked about ways to avoid spreading the disease. I explained that sexual partners should be tested, and if negative, they should  make a decision about the use of condoms.  We also talked about avoiding sharing razor blades or toothbrushes with anyone. I also advised that alcohol use should be avoided.    He was treated with Mavyret by his PCP.  He did complete that therapy.  His hepatitis C RNA was undetectable in February 2025.  He is a thus a sustained virologic responder (SVR) and is considered cured of his infection.  This obviously does not mean that his cirrhosis was cured.    The other main issue has been the hepatocellular carcinoma.  As noted above, this was proven by liver biopsy.  It was originally too large to be considered for resection or transplant.  He was treated with local regional therapy to see if we could downstage the tumor.  Unfortunately, despite 2 Y90 sessions, the tumor progressed.  He was felt to have multifocal disease and has subsequently been referred to medical oncology for systemic therapy.  They will continue to follow him.  They have started him on atezolizumab and bevacizumab.    He is immune to hepatitis A.    He is not immune to hepatitis B.  He completed his hepatitis B vaccines.  I will check for immunity with his next labs.    With his cirrhosis and thrombocytopenia and the use of bevacizumab, he does need EGD to screen for varices.    Given the fact that he reportedly had 16 polyps in the cecum as well as a polyp in the sigmoid colon, all of which were tubular adenomas, I recommended that a simultaneous colonoscopy be repeated.  He is aware of this but at this point does not wish to do a colonoscopy.  He reports that he is quite overwhelmed by all of his medical issues and will consider this in the future.    He needs to avoid all alcohol use given his cirrhosis.  I also recommended that he try and stop tobacco use.    He can see me back in clinic in about 7 to 8 months.    Thank you for allowing me to participate in the care of this patient. Please feel free to contact me with any questions regarding  their care.     Sincerely,     Tien Park MD, NHUNG, FAASLD, FACG.   Medical Director, Hepatology  Senior Attending Physician  [facility]  [facility]  Professor of Medicine  Division of Gastroenterology and Liver Disease  [facility] [employer]  Medicine  [address] [place] [id number]  Phone: [phone]  Fax: [phone].      Cc: Dr. Tai Ramon  cc: KELLEE Perdomo (oncology)      This document was generated with a computerized dictation system.  Because of this, there could be errors in grammar and/or content.         [1]   Past Medical History:  Diagnosis Date    Chronic hepatitis C without hepatic coma (Multi) 04/15/2024    Cirrhosis (Multi)     Coronary artery disease     Diabetes mellitus (Multi)     Hepatitis C     Hyperlipidemia, unspecified 12/17/2021    Dyslipidemia    Hypertension     Personal history of other endocrine, nutritional and metabolic disease 12/17/2021    History of diabetes mellitus    Personal history of other endocrine, nutritional and metabolic disease 12/17/2021    History of type 2 diabetes mellitus    Personal history of other endocrine, nutritional and metabolic disease 04/08/2022    History of type 2 diabetes mellitus    Personal history of other specified conditions 09/10/2021    History of chest pain    STEMI (ST elevation myocardial infarction) (Multi) 09/07/2023   [2]   Past Surgical History:  Procedure Laterality Date    OTHER SURGICAL HISTORY  07/23/2021    Cardiac catheterization with stent placement    OTHER SURGICAL HISTORY  07/29/2021    Cyst excision    OTHER SURGICAL HISTORY  07/29/2021    Cholecystectomy    OTHER SURGICAL HISTORY  07/29/2021    Shoulder surgery   [3]   Family History  Family history unknown: Yes   [4]   Allergies  Allergen Reactions    Metformin Diarrhea   [5]   Current Outpatient Medications   Medication Sig Dispense Refill    aspirin 81 mg EC  tablet Take 1 tablet (81 mg) by mouth once daily. (Patient not taking: Reported on 5/20/2025)      cyanocobalamin (Vitamin B-12) 1,000 mcg tablet Take 1 tablet (1,000 mcg) by mouth once daily.      dulaglutide (Trulicity) 4.5 mg/0.5 mL pen injector INJECT ONE PEN (4.5 MG) UNDER THE SKIN ONCE WEEKLY 2 mL 11    empagliflozin (Jardiance) 25 mg TAKE 1 TABLET (25 MG) BY MOUTH ONCE DAILY. 90 tablet 3    gabapentin (Neurontin) 100 mg capsule Take 1 capsule (100 mg) by mouth once daily at bedtime. 90 capsule 3    loperamide (Imodium) 2 mg capsule Take 2 capsules (4 mg) by mouth with the first episode of diarrhea and 1 capsule (2 mg) by mouth with any additional episodes. Maximum 8 capsules (16 mg) per day. 100 capsule 2    LORazepam (Ativan) 0.5 mg tablet Take 1 tablet (0.5 mg) by mouth if needed (for MRI testing (claustrophobia)).      losartan (Cozaar) 100 mg tablet TAKE ONE TABLET BY MOUTH once EVERY DAY 90 tablet 0    metoprolol succinate XL (Toprol-XL) 100 mg 24 hr tablet TAKE ONE TABLET BY MOUTH ONCE DAILY. DO NOT CRUSH OR CHEW 90 tablet 3    nitroglycerin (Nitrostat) 0.4 mg SL tablet Place under the tongue.      OneTouch Delica Plus Lancet 30 gauge misc       OneTouch Ultra Test strip       OneTouch Ultra2 Meter misc       prochlorperazine (Compazine) 10 mg tablet Take 1 tablet (10 mg) by mouth every 6 hours if needed for nausea or vomiting. 30 tablet 5    rosuvastatin (Crestor) 10 mg tablet Take 1 tablet (10 mg) by mouth once daily. 100 tablet 3     No current facility-administered medications for this visit.

## 2025-05-27 PROCEDURE — RXMED WILLOW AMBULATORY MEDICATION CHARGE

## 2025-06-03 ENCOUNTER — ANESTHESIA (OUTPATIENT)
Dept: GASTROENTEROLOGY | Facility: HOSPITAL | Age: 65
End: 2025-06-03
Payer: COMMERCIAL

## 2025-06-03 ENCOUNTER — PHARMACY VISIT (OUTPATIENT)
Dept: PHARMACY | Facility: CLINIC | Age: 65
End: 2025-06-03
Payer: COMMERCIAL

## 2025-06-03 ENCOUNTER — HOSPITAL ENCOUNTER (OUTPATIENT)
Dept: GASTROENTEROLOGY | Facility: HOSPITAL | Age: 65
Discharge: HOME | End: 2025-06-03
Payer: COMMERCIAL

## 2025-06-03 ENCOUNTER — ANESTHESIA EVENT (OUTPATIENT)
Dept: GASTROENTEROLOGY | Facility: HOSPITAL | Age: 65
End: 2025-06-03
Payer: COMMERCIAL

## 2025-06-03 VITALS
TEMPERATURE: 97.3 F | OXYGEN SATURATION: 97 % | DIASTOLIC BLOOD PRESSURE: 89 MMHG | SYSTOLIC BLOOD PRESSURE: 132 MMHG | HEART RATE: 74 BPM | RESPIRATION RATE: 16 BRPM

## 2025-06-03 DIAGNOSIS — C22.0 HCC (HEPATOCELLULAR CARCINOMA): ICD-10-CM

## 2025-06-03 PROBLEM — Z79.01 ANTICOAGULANT LONG-TERM USE: Status: ACTIVE | Noted: 2025-06-03

## 2025-06-03 PROBLEM — Z95.5 STENTED CORONARY ARTERY: Status: ACTIVE | Noted: 2025-06-03

## 2025-06-03 PROCEDURE — 3700000001 HC GENERAL ANESTHESIA TIME - INITIAL BASE CHARGE

## 2025-06-03 PROCEDURE — 7100000009 HC PHASE TWO TIME - INITIAL BASE CHARGE

## 2025-06-03 PROCEDURE — 3700000002 HC GENERAL ANESTHESIA TIME - EACH INCREMENTAL 1 MINUTE

## 2025-06-03 PROCEDURE — 2500000004 HC RX 250 GENERAL PHARMACY W/ HCPCS (ALT 636 FOR OP/ED): Performed by: NURSE ANESTHETIST, CERTIFIED REGISTERED

## 2025-06-03 PROCEDURE — 7100000010 HC PHASE TWO TIME - EACH INCREMENTAL 1 MINUTE

## 2025-06-03 PROCEDURE — 2500000004 HC RX 250 GENERAL PHARMACY W/ HCPCS (ALT 636 FOR OP/ED): Performed by: INTERNAL MEDICINE

## 2025-06-03 PROCEDURE — 43239 EGD BIOPSY SINGLE/MULTIPLE: CPT | Performed by: INTERNAL MEDICINE

## 2025-06-03 RX ORDER — PROPOFOL 10 MG/ML
INJECTION, EMULSION INTRAVENOUS AS NEEDED
Status: DISCONTINUED | OUTPATIENT
Start: 2025-06-03 | End: 2025-06-03

## 2025-06-03 RX ORDER — FENTANYL CITRATE 50 UG/ML
INJECTION, SOLUTION INTRAMUSCULAR; INTRAVENOUS AS NEEDED
Status: DISCONTINUED | OUTPATIENT
Start: 2025-06-03 | End: 2025-06-03

## 2025-06-03 RX ORDER — LIDOCAINE HYDROCHLORIDE 20 MG/ML
INJECTION, SOLUTION INFILTRATION; PERINEURAL AS NEEDED
Status: DISCONTINUED | OUTPATIENT
Start: 2025-06-03 | End: 2025-06-03

## 2025-06-03 RX ORDER — SODIUM CHLORIDE 9 MG/ML
20 INJECTION, SOLUTION INTRAVENOUS CONTINUOUS
Status: ACTIVE | OUTPATIENT
Start: 2025-06-03 | End: 2025-06-03

## 2025-06-03 RX ADMIN — FENTANYL CITRATE 25 MCG: 50 INJECTION INTRAMUSCULAR; INTRAVENOUS at 13:40

## 2025-06-03 RX ADMIN — PROPOFOL 50 MG: 10 INJECTION, EMULSION INTRAVENOUS at 13:42

## 2025-06-03 RX ADMIN — PROPOFOL 100 MG: 10 INJECTION, EMULSION INTRAVENOUS at 13:39

## 2025-06-03 RX ADMIN — PROPOFOL 50 MG: 10 INJECTION, EMULSION INTRAVENOUS at 13:44

## 2025-06-03 RX ADMIN — LIDOCAINE HYDROCHLORIDE 2 ML: 20 INJECTION, SOLUTION INFILTRATION; PERINEURAL at 13:39

## 2025-06-03 RX ADMIN — FENTANYL CITRATE 50 MCG: 50 INJECTION INTRAMUSCULAR; INTRAVENOUS at 13:39

## 2025-06-03 RX ADMIN — PROPOFOL 50 MG: 10 INJECTION, EMULSION INTRAVENOUS at 13:40

## 2025-06-03 RX ADMIN — FENTANYL CITRATE 25 MCG: 50 INJECTION INTRAMUSCULAR; INTRAVENOUS at 13:42

## 2025-06-03 RX ADMIN — SODIUM CHLORIDE 20 ML/HR: 0.9 INJECTION, SOLUTION INTRAVENOUS at 12:52

## 2025-06-03 SDOH — HEALTH STABILITY: MENTAL HEALTH: CURRENT SMOKER: 1

## 2025-06-03 ASSESSMENT — PAIN - FUNCTIONAL ASSESSMENT
PAIN_FUNCTIONAL_ASSESSMENT: 0-10

## 2025-06-03 ASSESSMENT — PAIN SCALES - GENERAL
PAINLEVEL_OUTOF10: 0 - NO PAIN
PAIN_LEVEL: 0

## 2025-06-03 NOTE — ANESTHESIA POSTPROCEDURE EVALUATION
Patient: Hemant Ness    Procedure Summary       Date: 06/03/25 Room / Location: [facility]    Anesthesia Start: 1329 Anesthesia Stop: 1354    Procedure: EGD Diagnosis: HCC (hepatocellular carcinoma)    Scheduled Providers: Ever Frank MD Responsible Provider: PAULINE Angelo    Anesthesia Type: MAC ASA Status: 3            Anesthesia Type: MAC    Vitals Value Taken Time   /89 06/03/25 14:17   Temp 36.3 °C (97.3 °F) 06/03/25 14:17   Pulse 74 06/03/25 14:17   Resp 16 06/03/25 14:17   SpO2 97 % 06/03/25 14:17       Anesthesia Post Evaluation    Patient location during evaluation: bedside  Patient participation: complete - patient participated  Level of consciousness: awake and alert  Pain score: 0  Pain management: adequate  Airway patency: patent  Cardiovascular status: stable  Respiratory status: acceptable  Hydration status: acceptable  Postoperative Nausea and Vomiting: none        There were no known notable events for this encounter.

## 2025-06-03 NOTE — ANESTHESIA PREPROCEDURE EVALUATION
Patient: Hemant Ness    Procedure Information       Anesthesia Start Date/Time: 06/03/25 1329    Scheduled providers: Ever Frank MD    Procedure: EGD    Location: [facility]            Relevant Problems   Anesthesia (within normal limits)      Cardiac   (+) CAD (coronary artery disease)   (+) Diabetes mellitus type 2 with peripheral artery disease   (+) Hypertensive heart disease with chronic diastolic congestive heart failure   (+) Stented coronary artery      Neuro   (+) Acute cervical radiculopathy   (+) Situational anxiety      /Renal   (+) BPH with elevated PSA      Liver   (+) Cirrhosis of liver (Multi)   (+) HCC (hepatocellular carcinoma)      Endocrine   (+) Class 2 severe obesity due to excess calories with serious comorbidity and body mass index (BMI) of 35.0 to 35.9 in adult   (+) Dyslipidemia associated with type 2 diabetes mellitus   (+) Type 2 diabetes mellitus with hyperglycemia, without long-term current use of insulin      Hematology   (+) Anticoagulant long-term use       Clinical information reviewed:   Tobacco  Allergies  Meds   Med Hx  Surg Hx   Fam Hx  Soc Hx        NPO Detail:  NPO/Void Status  Carbohydrate Drink Given Prior to Surgery? : N  Date of Last Liquid: 06/03/25  Time of Last Liquid: 0800  Date of Last Solid: 06/02/25  Time of Last Solid: 2200  Last Intake Type: Clear fluids  Time of Last Void: 0000         Physical Exam    Airway  Mallampati: III  TM distance: >3 FB  Neck ROM: full  Mouth opening: 3 or more finger widths     Cardiovascular - normal exam  Rhythm: regular  Rate: normal     Dental - normal exam    (+) upper dentures, lower dentures     Pulmonary - normal exam   Abdominal - normal exam           Anesthesia Plan    History of general anesthesia?: yes  History of complications of general anesthesia?: no    ASA 3     MAC     The patient is a current smoker.  Patient was previously instructed to abstain from smoking on day of  procedure.  Patient smoked on day of procedure.    intravenous induction   Anesthetic plan and risks discussed with patient.

## 2025-06-04 ENCOUNTER — APPOINTMENT (OUTPATIENT)
Facility: CLINIC | Age: 65
End: 2025-06-04
Payer: COMMERCIAL

## 2025-06-05 DIAGNOSIS — B18.2 HEPATIC CIRRHOSIS DUE TO CHRONIC HEPATITIS C INFECTION (MULTI): ICD-10-CM

## 2025-06-05 DIAGNOSIS — C22.0 HCC (HEPATOCELLULAR CARCINOMA): ICD-10-CM

## 2025-06-05 DIAGNOSIS — K74.60 HEPATIC CIRRHOSIS DUE TO CHRONIC HEPATITIS C INFECTION (MULTI): ICD-10-CM

## 2025-06-06 ENCOUNTER — LAB (OUTPATIENT)
Dept: LAB | Facility: CLINIC | Age: 65
End: 2025-06-06
Payer: COMMERCIAL

## 2025-06-06 ENCOUNTER — OFFICE VISIT (OUTPATIENT)
Dept: HEMATOLOGY/ONCOLOGY | Facility: CLINIC | Age: 65
End: 2025-06-06
Payer: COMMERCIAL

## 2025-06-06 VITALS
HEART RATE: 81 BPM | SYSTOLIC BLOOD PRESSURE: 124 MMHG | BODY MASS INDEX: 34.03 KG/M2 | OXYGEN SATURATION: 96 % | DIASTOLIC BLOOD PRESSURE: 86 MMHG | RESPIRATION RATE: 18 BRPM | WEIGHT: 237.2 LBS | TEMPERATURE: 98.4 F

## 2025-06-06 DIAGNOSIS — C22.0 HCC (HEPATOCELLULAR CARCINOMA): ICD-10-CM

## 2025-06-06 DIAGNOSIS — C22.0 HCC (HEPATOCELLULAR CARCINOMA): Primary | ICD-10-CM

## 2025-06-06 LAB
AFP SERPL-MCNC: 5 NG/ML (ref 0–9)
ALBUMIN SERPL BCP-MCNC: 4.3 G/DL (ref 3.4–5)
ALP SERPL-CCNC: 82 U/L (ref 33–136)
ALT SERPL W P-5'-P-CCNC: 30 U/L (ref 10–52)
ANION GAP SERPL CALC-SCNC: 15 MMOL/L (ref 10–20)
APPEARANCE UR: CLEAR
AST SERPL W P-5'-P-CCNC: 29 U/L (ref 9–39)
BASOPHILS # BLD AUTO: 0.01 X10*3/UL (ref 0–0.1)
BASOPHILS NFR BLD AUTO: 0.2 %
BILIRUB SERPL-MCNC: 0.6 MG/DL (ref 0–1.2)
BILIRUB UR STRIP.AUTO-MCNC: NEGATIVE MG/DL
BUN SERPL-MCNC: 14 MG/DL (ref 6–23)
CALCIUM SERPL-MCNC: 9.9 MG/DL (ref 8.6–10.6)
CHLORIDE SERPL-SCNC: 106 MMOL/L (ref 98–107)
CO2 SERPL-SCNC: 23 MMOL/L (ref 21–32)
COLOR UR: YELLOW
CORTIS AM PEAK SERPL-MSCNC: 10.1 UG/DL (ref 5–20)
CREAT SERPL-MCNC: 0.92 MG/DL (ref 0.5–1.3)
EGFRCR SERPLBLD CKD-EPI 2021: >90 ML/MIN/1.73M*2
EOSINOPHIL # BLD AUTO: 0.12 X10*3/UL (ref 0–0.7)
EOSINOPHIL NFR BLD AUTO: 2.4 %
ERYTHROCYTE [DISTWIDTH] IN BLOOD BY AUTOMATED COUNT: 13.8 % (ref 11.5–14.5)
GLUCOSE SERPL-MCNC: 112 MG/DL (ref 74–99)
GLUCOSE UR STRIP.AUTO-MCNC: ABNORMAL MG/DL
HBV SURFACE AB SER-ACNC: <3.1 MIU/ML
HCT VFR BLD AUTO: 46.6 % (ref 41–52)
HGB BLD-MCNC: 15.4 G/DL (ref 13.5–17.5)
IMM GRANULOCYTES # BLD AUTO: 0.03 X10*3/UL (ref 0–0.7)
IMM GRANULOCYTES NFR BLD AUTO: 0.6 % (ref 0–0.9)
KETONES UR STRIP.AUTO-MCNC: NEGATIVE MG/DL
LEUKOCYTE ESTERASE UR QL STRIP.AUTO: NEGATIVE
LYMPHOCYTES # BLD AUTO: 0.61 X10*3/UL (ref 1.2–4.8)
LYMPHOCYTES NFR BLD AUTO: 12.3 %
MCH RBC QN AUTO: 32.4 PG (ref 26–34)
MCHC RBC AUTO-ENTMCNC: 33 G/DL (ref 32–36)
MCV RBC AUTO: 98 FL (ref 80–100)
MONOCYTES # BLD AUTO: 0.58 X10*3/UL (ref 0.1–1)
MONOCYTES NFR BLD AUTO: 11.7 %
NEUTROPHILS # BLD AUTO: 3.62 X10*3/UL (ref 1.2–7.7)
NEUTROPHILS NFR BLD AUTO: 72.8 %
NITRITE UR QL STRIP.AUTO: NEGATIVE
NRBC BLD-RTO: ABNORMAL /100{WBCS}
PH UR STRIP.AUTO: 6 [PH]
PLATELET # BLD AUTO: 122 X10*3/UL (ref 150–450)
POTASSIUM SERPL-SCNC: 4.7 MMOL/L (ref 3.5–5.3)
PROT SERPL-MCNC: 7.2 G/DL (ref 6.4–8.2)
PROT UR STRIP.AUTO-MCNC: NEGATIVE MG/DL
RBC # BLD AUTO: 4.76 X10*6/UL (ref 4.5–5.9)
RBC # UR STRIP.AUTO: ABNORMAL MG/DL
RBC #/AREA URNS AUTO: NORMAL /HPF
SODIUM SERPL-SCNC: 139 MMOL/L (ref 136–145)
SP GR UR STRIP.AUTO: 1.01
SQUAMOUS #/AREA URNS AUTO: NORMAL /HPF
TSH SERPL-ACNC: 1.01 MIU/L (ref 0.44–3.98)
UROBILINOGEN UR STRIP.AUTO-MCNC: 1 MG/DL
WBC # BLD AUTO: 5 X10*3/UL (ref 4.4–11.3)
WBC #/AREA URNS AUTO: NORMAL /HPF

## 2025-06-06 PROCEDURE — 80053 COMPREHEN METABOLIC PANEL: CPT

## 2025-06-06 PROCEDURE — 82533 TOTAL CORTISOL: CPT

## 2025-06-06 PROCEDURE — 99215 OFFICE O/P EST HI 40 MIN: CPT

## 2025-06-06 PROCEDURE — 82105 ALPHA-FETOPROTEIN SERUM: CPT

## 2025-06-06 PROCEDURE — 85025 COMPLETE CBC W/AUTO DIFF WBC: CPT

## 2025-06-06 PROCEDURE — 86706 HEP B SURFACE ANTIBODY: CPT | Performed by: INTERNAL MEDICINE

## 2025-06-06 PROCEDURE — 1125F AMNT PAIN NOTED PAIN PRSNT: CPT

## 2025-06-06 PROCEDURE — 3079F DIAST BP 80-89 MM HG: CPT

## 2025-06-06 PROCEDURE — 36415 COLL VENOUS BLD VENIPUNCTURE: CPT

## 2025-06-06 PROCEDURE — 4010F ACE/ARB THERAPY RXD/TAKEN: CPT

## 2025-06-06 PROCEDURE — 1159F MED LIST DOCD IN RCRD: CPT

## 2025-06-06 PROCEDURE — 3074F SYST BP LT 130 MM HG: CPT

## 2025-06-06 PROCEDURE — 84443 ASSAY THYROID STIM HORMONE: CPT

## 2025-06-06 PROCEDURE — 81001 URINALYSIS AUTO W/SCOPE: CPT

## 2025-06-06 PROCEDURE — 1160F RVW MEDS BY RX/DR IN RCRD: CPT

## 2025-06-06 PROCEDURE — 84075 ASSAY ALKALINE PHOSPHATASE: CPT

## 2025-06-06 RX ORDER — PROCHLORPERAZINE MALEATE 10 MG
10 TABLET ORAL EVERY 6 HOURS PRN
OUTPATIENT
Start: 2025-06-10

## 2025-06-06 RX ORDER — DIPHENHYDRAMINE HYDROCHLORIDE 50 MG/ML
50 INJECTION, SOLUTION INTRAMUSCULAR; INTRAVENOUS AS NEEDED
OUTPATIENT
Start: 2025-06-10

## 2025-06-06 RX ORDER — PROCHLORPERAZINE EDISYLATE 5 MG/ML
10 INJECTION INTRAMUSCULAR; INTRAVENOUS EVERY 6 HOURS PRN
OUTPATIENT
Start: 2025-06-10

## 2025-06-06 RX ORDER — HYDROCORTISONE 10 MG/ML
LOTION TOPICAL 2 TIMES DAILY
Qty: 96 G | Refills: 2 | Status: SHIPPED | OUTPATIENT
Start: 2025-06-06

## 2025-06-06 RX ORDER — FAMOTIDINE 10 MG/ML
20 INJECTION, SOLUTION INTRAVENOUS ONCE AS NEEDED
OUTPATIENT
Start: 2025-06-10

## 2025-06-06 RX ORDER — EPINEPHRINE 0.3 MG/.3ML
0.3 INJECTION SUBCUTANEOUS EVERY 5 MIN PRN
OUTPATIENT
Start: 2025-06-10

## 2025-06-06 RX ORDER — TRAZODONE HYDROCHLORIDE 50 MG/1
50 TABLET ORAL NIGHTLY
Qty: 30 TABLET | Refills: 0 | Status: SHIPPED | OUTPATIENT
Start: 2025-06-06 | End: 2025-07-06

## 2025-06-06 RX ORDER — ALBUTEROL SULFATE 0.83 MG/ML
3 SOLUTION RESPIRATORY (INHALATION) AS NEEDED
OUTPATIENT
Start: 2025-06-10

## 2025-06-06 ASSESSMENT — ENCOUNTER SYMPTOMS
VOMITING: 0
MUSCULOSKELETAL NEGATIVE: 1
FEVER: 0
ABDOMINAL PAIN: 0
CARDIOVASCULAR NEGATIVE: 1
ENDOCRINE NEGATIVE: 1
NEUROLOGICAL NEGATIVE: 1
CHILLS: 0
RESPIRATORY NEGATIVE: 1
HEMATOLOGIC/LYMPHATIC NEGATIVE: 1
NAUSEA: 0
SLEEP DISTURBANCE: 1
EYES NEGATIVE: 1

## 2025-06-06 ASSESSMENT — PAIN SCALES - GENERAL: PAINLEVEL_OUTOF10: 4

## 2025-06-06 NOTE — PROGRESS NOTES
Patient ID: Hemant Ness is a 64 y.o. male.  Date of Service: 06/06/25  Referring Physician: Tai Ramon MD  [id number]  [place] [phone]  Primary Care Provider: Jose Juan Mukherjee DO      Subjective    Oncology History   HCC (hepatocellular carcinoma)   6/2024 Initial Diagnosis    Hepatocellular carcinoma  History of alcohol use and hepatitis C and cirrhosis was found to have 6.6 cm lesion in the left hepatic lobe.  Biopsy of the liver lesion on 6/17/2024 was consistent with moderately differentiated hepatocellular cancer in the background of cirrhosis.  aFP was 35 on 6/11/2024 9/16/2024 Procedure    Y90 radioembolization to segment 8 of liver     10/29/2024 Procedure    Y90 radioembolization of the posterior branch of right hepatic artery     2/19/2025 Recurrence    MRI liver showed multifocal bilobar liver lesions with largest measuring 2.8 cm in segment 3     4/29/2025 -  Chemotherapy    Atezolizumab + Bevacizumab, 21 Day Cycles         HPI  Mr. Ness returns in follow up today. Overall, he is tolerating treatment with Atezolizumab + Bevacizumab fairly well. Does report mild fatigue, but reports it as manageable. Appetite is decent. No issues with nausea or vomiting. Does report occasional difficulty sleeping and pruritus without any visiable rash. Denies any other new concerns today.     Review of Systems   Constitutional:  Negative for chills and fever.   HENT:  Negative.     Eyes: Negative.    Respiratory: Negative.     Cardiovascular: Negative.    Gastrointestinal:  Negative for abdominal pain, nausea and vomiting.   Endocrine: Negative.    Genitourinary: Negative.     Musculoskeletal: Negative.    Skin:  Positive for itching.   Neurological: Negative.    Hematological: Negative.    Psychiatric/Behavioral:  Positive for sleep disturbance.    All other systems reviewed and are negative.         Patient Active Problem List   Diagnosis    Type 2 diabetes mellitus with hyperglycemia,  without long-term current use of insulin    Actinic keratoses    BPH with elevated PSA    CAD (coronary artery disease)    Current smoker    Dyslipidemia associated with type 2 diabetes mellitus    Hypertensive heart disease with chronic diastolic congestive heart failure    Sleep-disordered breathing    Uncomplicated alcohol dependence (Multi)    Diabetes mellitus type 2 with peripheral artery disease    Cirrhosis of liver (Multi)    Situational anxiety    Adenomatous polyp of ascending colon    Acute cervical radiculopathy    HCC (hepatocellular carcinoma)    Hepatitis A and hepatitis B vaccine administered    Class 2 severe obesity due to excess calories with serious comorbidity and body mass index (BMI) of 35.0 to 35.9 in adult    Stented coronary artery    Anticoagulant long-term use     Past Medical History:   Diagnosis Date    Chronic hepatitis C without hepatic coma (Multi) 04/15/2024    Cirrhosis (Multi)     Coronary artery disease     Diabetes mellitus (Multi)     Hepatitis C     Hyperlipidemia, unspecified 12/17/2021    Dyslipidemia    Hypertension     Personal history of other endocrine, nutritional and metabolic disease 12/17/2021    History of diabetes mellitus    Personal history of other endocrine, nutritional and metabolic disease 12/17/2021    History of type 2 diabetes mellitus    Personal history of other endocrine, nutritional and metabolic disease 04/08/2022    History of type 2 diabetes mellitus    Personal history of other specified conditions 09/10/2021    History of chest pain    STEMI (ST elevation myocardial infarction) (Multi) 09/07/2023     Past Surgical History:   Procedure Laterality Date    OTHER SURGICAL HISTORY  07/23/2021    Cardiac catheterization with stent placement    OTHER SURGICAL HISTORY  07/29/2021    Cyst excision    OTHER SURGICAL HISTORY  07/29/2021    Cholecystectomy    OTHER SURGICAL HISTORY  07/29/2021    Shoulder surgery     Family History   Family history unknown:  Yes       Current Outpatient Medications:     cyanocobalamin (Vitamin B-12) 1,000 mcg tablet, Take 1 tablet (1,000 mcg) by mouth once daily., Disp: , Rfl:     dulaglutide (Trulicity) 4.5 mg/0.5 mL pen injector, INJECT ONE PEN (4.5 MG) UNDER THE SKIN ONCE WEEKLY, Disp: 2 mL, Rfl: 11    empagliflozin (Jardiance) 25 mg, TAKE 1 TABLET (25 MG) BY MOUTH ONCE DAILY., Disp: 90 tablet, Rfl: 3    gabapentin (Neurontin) 100 mg capsule, Take 1 capsule (100 mg) by mouth once daily at bedtime., Disp: 90 capsule, Rfl: 3    loperamide (Imodium) 2 mg capsule, Take 2 capsules (4 mg) by mouth with the first episode of diarrhea and 1 capsule (2 mg) by mouth with any additional episodes. Maximum 8 capsules (16 mg) per day., Disp: 100 capsule, Rfl: 2    losartan (Cozaar) 100 mg tablet, TAKE ONE TABLET BY MOUTH once EVERY DAY, Disp: 90 tablet, Rfl: 0    metoprolol succinate XL (Toprol-XL) 100 mg 24 hr tablet, TAKE ONE TABLET BY MOUTH ONCE DAILY. DO NOT CRUSH OR CHEW, Disp: 90 tablet, Rfl: 3    OneTouch Delica Plus Lancet 30 gauge misc, , Disp: , Rfl:     OneTouch Ultra Test strip, , Disp: , Rfl:     OneTouch Ultra2 Meter misc, , Disp: , Rfl:     prochlorperazine (Compazine) 10 mg tablet, Take 1 tablet (10 mg) by mouth every 6 hours if needed for nausea or vomiting., Disp: 30 tablet, Rfl: 5    rosuvastatin (Crestor) 10 mg tablet, Take 1 tablet (10 mg) by mouth once daily., Disp: 100 tablet, Rfl: 3    aspirin 81 mg EC tablet, Take 1 tablet (81 mg) by mouth once daily. (Patient not taking: Reported on 6/6/2025), Disp: , Rfl:     hydrocortisone 1 % lotion, Apply topically 2 times a day., Disp: 96 g, Rfl: 2    LORazepam (Ativan) 0.5 mg tablet, Take 1 tablet (0.5 mg) by mouth if needed (for MRI testing (claustrophobia)). (Patient not taking: Reported on 6/6/2025), Disp: , Rfl:     nitroglycerin (Nitrostat) 0.4 mg SL tablet, Place under the tongue. (Patient not taking: Reported on 6/6/2025), Disp: , Rfl:     traZODone (Desyrel) 50 mg tablet,  Take 1 tablet (50 mg) by mouth once daily at bedtime., Disp: 30 tablet, Rfl: 0      Objective   BSA: 2.31 meters squared  /86   Pulse 81   Temp 36.9 °C (98.4 °F)   Resp 18   Wt 108 kg (237 lb 3.2 oz)   SpO2 96%   BMI 34.03 kg/m²     Performance Status:  Asymptomatic    Physical Exam  Vitals reviewed.   Constitutional:       General: He is not in acute distress.     Appearance: Normal appearance.   HENT:      Head: Normocephalic and atraumatic.   Eyes:      Extraocular Movements: Extraocular movements intact.      Pupils: Pupils are equal, round, and reactive to light.   Cardiovascular:      Rate and Rhythm: Normal rate and regular rhythm.   Pulmonary:      Effort: Pulmonary effort is normal.      Breath sounds: Normal breath sounds.   Abdominal:      General: Bowel sounds are normal.      Tenderness: There is no abdominal tenderness.   Musculoskeletal:         General: Normal range of motion.   Neurological:      General: No focal deficit present.      Mental Status: He is alert and oriented to person, place, and time.   Psychiatric:         Behavior: Behavior normal.       === 08/05/24 ===    CT ABDOMEN PELVIS ANGIOGRAM W AND/OR WO IV CONTRAST    - Impression -  1. Early enhancing confluent hepatic segment 8 lesion with early  washout on delayed phase imaging consistent with known LI-RADS 5  lesion better characterized on MRI imaging from 05/25/2024. There is  increase in size of the largest portion of the lesion when compared  to the MRI imaging within the differences in technique.  2. Conventional anatomy of the celiac trunk and arterial anatomy  supplying the hepatic parenchyma with feeding branches of the hepatic  lesion arising from left and right hepatic arteries.    3. Enlarged prostate gland with thickening of the urinary bladder  wall. Correlate with patient's symptoms of urinary retention.    Signed by: Saul Weinstein 8/9/2024 6:23 AM  Dictation workstation:   [id number]    Assessment/Plan        #1 cirrhosis related to hepatitis C, treated and alcohol use  #2 multifocal HCC    Patient is a 64 y.o. male with cirrhosis related to treated hepatitis C and alcohol use.  Patient presented with multifocal HCC. He has been treated with Y 90 x 2 in the past.  Unfortunately, he had recurrent multifocal disease.  Tumor board recommendation was to consider systemic therapy.    We discussed in detail with the patient and his wife the prognosis of hepatocellular cancer.  We discussed first-line treatment options including atezolizumab plus bevacizumab, durvalumab plus durvalumab, sorafenib and lenvatinib.  After prolonged discussion, patient decided to receive atezolizumab plus bevacizumab.  We also ordered EGD as well as restaging scans. Mr. Ness officially started treatment 4/29/25.     Today, he returns prior to cycle 3 on Monday. He is tolerating treatment fairly well so far. We will proceed as planned on Monday. Restaging scans to be done following this cycle.     Plan was discussed in detail with the patient and patient was in agreement with the plan of care. Mr. Ness knows to call with any issues or concerns.            Ynes Garcia, APRN-CNP                          Saucerization Excision Additional Text (Leave Blank If You Do Not Want): The margin was drawn around the clinically apparent lesion.  Incisions were then made along these lines, in a tangential fashion, to the appropriate tissue plane and the lesion was extirpated.

## 2025-06-10 ENCOUNTER — INFUSION (OUTPATIENT)
Dept: HEMATOLOGY/ONCOLOGY | Facility: CLINIC | Age: 65
End: 2025-06-10
Payer: COMMERCIAL

## 2025-06-10 VITALS
WEIGHT: 239.3 LBS | RESPIRATION RATE: 16 BRPM | HEART RATE: 89 BPM | HEIGHT: 70 IN | DIASTOLIC BLOOD PRESSURE: 86 MMHG | TEMPERATURE: 98.2 F | OXYGEN SATURATION: 96 % | BODY MASS INDEX: 34.26 KG/M2 | SYSTOLIC BLOOD PRESSURE: 134 MMHG

## 2025-06-10 DIAGNOSIS — C22.0 HCC (HEPATOCELLULAR CARCINOMA): ICD-10-CM

## 2025-06-10 PROCEDURE — 96417 CHEMO IV INFUS EACH ADDL SEQ: CPT

## 2025-06-10 PROCEDURE — 2500000004 HC RX 250 GENERAL PHARMACY W/ HCPCS (ALT 636 FOR OP/ED)

## 2025-06-10 PROCEDURE — 96413 CHEMO IV INFUSION 1 HR: CPT

## 2025-06-10 RX ORDER — FAMOTIDINE 10 MG/ML
20 INJECTION, SOLUTION INTRAVENOUS ONCE AS NEEDED
Status: DISCONTINUED | OUTPATIENT
Start: 2025-06-10 | End: 2025-06-10 | Stop reason: HOSPADM

## 2025-06-10 RX ORDER — ALBUTEROL SULFATE 0.83 MG/ML
3 SOLUTION RESPIRATORY (INHALATION) AS NEEDED
Status: DISCONTINUED | OUTPATIENT
Start: 2025-06-10 | End: 2025-06-10 | Stop reason: HOSPADM

## 2025-06-10 RX ORDER — PROCHLORPERAZINE EDISYLATE 5 MG/ML
10 INJECTION INTRAMUSCULAR; INTRAVENOUS EVERY 6 HOURS PRN
Status: DISCONTINUED | OUTPATIENT
Start: 2025-06-10 | End: 2025-06-10 | Stop reason: HOSPADM

## 2025-06-10 RX ORDER — PROCHLORPERAZINE MALEATE 10 MG
10 TABLET ORAL EVERY 6 HOURS PRN
Status: DISCONTINUED | OUTPATIENT
Start: 2025-06-10 | End: 2025-06-10 | Stop reason: HOSPADM

## 2025-06-10 RX ORDER — EPINEPHRINE 0.3 MG/.3ML
0.3 INJECTION SUBCUTANEOUS EVERY 5 MIN PRN
Status: DISCONTINUED | OUTPATIENT
Start: 2025-06-10 | End: 2025-06-10 | Stop reason: HOSPADM

## 2025-06-10 RX ORDER — DIPHENHYDRAMINE HYDROCHLORIDE 50 MG/ML
50 INJECTION, SOLUTION INTRAMUSCULAR; INTRAVENOUS AS NEEDED
Status: DISCONTINUED | OUTPATIENT
Start: 2025-06-10 | End: 2025-06-10 | Stop reason: HOSPADM

## 2025-06-10 RX ADMIN — ATEZOLIZUMAB 1200 MG: 1200 INJECTION, SOLUTION INTRAVENOUS at 12:39

## 2025-06-10 RX ADMIN — BEVACIZUMAB-AWWB 1600 MG: 400 INJECTION, SOLUTION INTRAVENOUS at 13:17

## 2025-06-10 ASSESSMENT — PAIN SCALES - GENERAL: PAINLEVEL_OUTOF10: 0-NO PAIN

## 2025-06-10 NOTE — PROGRESS NOTES
Pt here for Tecentriq/Bevacizumab infusion, tolerated well. Discharged in stable condition, no further needs at this time. Has schedule for follow up.

## 2025-06-13 LAB
LAB AP ASR DISCLAIMER: NORMAL
LABORATORY COMMENT REPORT: NORMAL
PATH REPORT.FINAL DX SPEC: NORMAL
PATH REPORT.GROSS SPEC: NORMAL
PATH REPORT.RELEVANT HX SPEC: NORMAL
PATH REPORT.TOTAL CANCER: NORMAL

## 2025-06-21 DIAGNOSIS — E11.69 DYSLIPIDEMIA ASSOCIATED WITH TYPE 2 DIABETES MELLITUS: ICD-10-CM

## 2025-06-21 DIAGNOSIS — E78.5 DYSLIPIDEMIA ASSOCIATED WITH TYPE 2 DIABETES MELLITUS: ICD-10-CM

## 2025-06-21 DIAGNOSIS — I10 HYPERTENSION, BENIGN: ICD-10-CM

## 2025-06-23 RX ORDER — LOSARTAN POTASSIUM 100 MG/1
100 TABLET ORAL DAILY
Qty: 90 TABLET | Refills: 0 | Status: SHIPPED | OUTPATIENT
Start: 2025-06-23

## 2025-06-23 RX ORDER — ROSUVASTATIN CALCIUM 10 MG/1
10 TABLET, COATED ORAL DAILY
Qty: 100 TABLET | Refills: 0 | Status: SHIPPED | OUTPATIENT
Start: 2025-06-23

## 2025-06-25 ENCOUNTER — NURSE TRIAGE (OUTPATIENT)
Dept: ADMISSION | Facility: HOSPITAL | Age: 65
End: 2025-06-25
Payer: COMMERCIAL

## 2025-06-25 NOTE — TELEPHONE ENCOUNTER
Pt's wife reporting that pt has had ongoing diarrhea since Tues 6/24. 5-6 bouts of diarrhea- no bloody or black tarry stool. Also had a few episodes of nausea and emesis- bile colored.   No fever. Pt did use imodium and it was effective.   Stools are loose today, but lessened in frequency. Drinking water and pedialyte. Urine pale yellow.   Pt has virtual visit 6/27, next infusion on 7/1.

## 2025-06-26 ENCOUNTER — TELEPHONE (OUTPATIENT)
Dept: PRIMARY CARE | Facility: CLINIC | Age: 65
End: 2025-06-26
Payer: COMMERCIAL

## 2025-06-26 ENCOUNTER — LAB (OUTPATIENT)
Dept: LAB | Facility: HOSPITAL | Age: 65
End: 2025-06-26
Payer: COMMERCIAL

## 2025-06-26 DIAGNOSIS — M54.12 ACUTE CERVICAL RADICULOPATHY: Primary | ICD-10-CM

## 2025-06-26 DIAGNOSIS — C22.0 HCC (HEPATOCELLULAR CARCINOMA): ICD-10-CM

## 2025-06-26 LAB
ALBUMIN SERPL BCP-MCNC: 4.4 G/DL (ref 3.4–5)
ALP SERPL-CCNC: 76 U/L (ref 33–136)
ALT SERPL W P-5'-P-CCNC: 17 U/L (ref 10–52)
ANION GAP SERPL CALC-SCNC: 14 MMOL/L (ref 10–20)
APPEARANCE UR: CLEAR
AST SERPL W P-5'-P-CCNC: 21 U/L (ref 9–39)
BASOPHILS # BLD AUTO: 0.02 X10*3/UL (ref 0–0.1)
BASOPHILS NFR BLD AUTO: 0.3 %
BILIRUB SERPL-MCNC: 0.6 MG/DL (ref 0–1.2)
BILIRUB UR STRIP.AUTO-MCNC: NEGATIVE MG/DL
BUN SERPL-MCNC: 13 MG/DL (ref 6–23)
CALCIUM SERPL-MCNC: 9 MG/DL (ref 8.6–10.3)
CHLORIDE SERPL-SCNC: 103 MMOL/L (ref 98–107)
CO2 SERPL-SCNC: 22 MMOL/L (ref 21–32)
COLOR UR: YELLOW
CREAT SERPL-MCNC: 1.16 MG/DL (ref 0.5–1.3)
EGFRCR SERPLBLD CKD-EPI 2021: 69 ML/MIN/1.73M*2
EOSINOPHIL # BLD AUTO: 0.22 X10*3/UL (ref 0–0.7)
EOSINOPHIL NFR BLD AUTO: 3.3 %
ERYTHROCYTE [DISTWIDTH] IN BLOOD BY AUTOMATED COUNT: 15 % (ref 11.5–14.5)
GLUCOSE SERPL-MCNC: 111 MG/DL (ref 74–99)
GLUCOSE UR STRIP.AUTO-MCNC: ABNORMAL MG/DL
HCT VFR BLD AUTO: 48.7 % (ref 41–52)
HGB BLD-MCNC: 15.9 G/DL (ref 13.5–17.5)
IMM GRANULOCYTES # BLD AUTO: 0.03 X10*3/UL (ref 0–0.7)
IMM GRANULOCYTES NFR BLD AUTO: 0.5 % (ref 0–0.9)
KETONES UR STRIP.AUTO-MCNC: NEGATIVE MG/DL
LEUKOCYTE ESTERASE UR QL STRIP.AUTO: NEGATIVE
LYMPHOCYTES # BLD AUTO: 0.78 X10*3/UL (ref 1.2–4.8)
LYMPHOCYTES NFR BLD AUTO: 11.8 %
MCH RBC QN AUTO: 32.4 PG (ref 26–34)
MCHC RBC AUTO-ENTMCNC: 32.6 G/DL (ref 32–36)
MCV RBC AUTO: 99 FL (ref 80–100)
MONOCYTES # BLD AUTO: 0.65 X10*3/UL (ref 0.1–1)
MONOCYTES NFR BLD AUTO: 9.8 %
NEUTROPHILS # BLD AUTO: 4.92 X10*3/UL (ref 1.2–7.7)
NEUTROPHILS NFR BLD AUTO: 74.3 %
NITRITE UR QL STRIP.AUTO: NEGATIVE
PH UR STRIP.AUTO: 6 [PH]
PLATELET # BLD AUTO: 116 X10*3/UL (ref 150–450)
POTASSIUM SERPL-SCNC: 4.2 MMOL/L (ref 3.5–5.3)
PROT SERPL-MCNC: 7.9 G/DL (ref 6.4–8.2)
PROT UR STRIP.AUTO-MCNC: NEGATIVE MG/DL
RBC # BLD AUTO: 4.91 X10*6/UL (ref 4.5–5.9)
RBC # UR STRIP.AUTO: NEGATIVE MG/DL
SODIUM SERPL-SCNC: 135 MMOL/L (ref 136–145)
SP GR UR STRIP.AUTO: 1.02
UROBILINOGEN UR STRIP.AUTO-MCNC: ABNORMAL MG/DL
WBC # BLD AUTO: 6.6 X10*3/UL (ref 4.4–11.3)

## 2025-06-26 PROCEDURE — 80053 COMPREHEN METABOLIC PANEL: CPT

## 2025-06-26 PROCEDURE — 36415 COLL VENOUS BLD VENIPUNCTURE: CPT

## 2025-06-26 PROCEDURE — 85025 COMPLETE CBC W/AUTO DIFF WBC: CPT

## 2025-06-26 PROCEDURE — 81003 URINALYSIS AUTO W/O SCOPE: CPT

## 2025-06-26 NOTE — TELEPHONE ENCOUNTER
I tried scheduling with Gene Sauceda , first question is asking for date of imaging that was done on cervical area.  Hemant said he has not had any imaging on his neck.  Could not schedule without this.

## 2025-06-26 NOTE — TELEPHONE ENCOUNTER
Patient stopped in to see if he can get a referral or do something about getting his neck worked on. He states he has talked with the Dr about the neck problems already. He is in a lot of pain. Can you make him a referral and we can get him scheduled for whatever you advise him to do?  Ginger Sid in [place]  Sched 9/3

## 2025-06-27 ENCOUNTER — TELEMEDICINE (OUTPATIENT)
Dept: HEMATOLOGY/ONCOLOGY | Facility: CLINIC | Age: 65
End: 2025-06-27
Payer: COMMERCIAL

## 2025-06-27 DIAGNOSIS — C22.0 HCC (HEPATOCELLULAR CARCINOMA): Primary | ICD-10-CM

## 2025-06-27 PROCEDURE — 99215 OFFICE O/P EST HI 40 MIN: CPT | Performed by: INTERNAL MEDICINE

## 2025-06-27 PROCEDURE — 4010F ACE/ARB THERAPY RXD/TAKEN: CPT | Performed by: INTERNAL MEDICINE

## 2025-06-27 RX ORDER — EPINEPHRINE 0.3 MG/.3ML
0.3 INJECTION SUBCUTANEOUS EVERY 5 MIN PRN
OUTPATIENT
Start: 2025-07-01

## 2025-06-27 RX ORDER — DIPHENHYDRAMINE HYDROCHLORIDE 50 MG/ML
50 INJECTION, SOLUTION INTRAMUSCULAR; INTRAVENOUS AS NEEDED
OUTPATIENT
Start: 2025-07-01

## 2025-06-27 RX ORDER — PROCHLORPERAZINE MALEATE 10 MG
10 TABLET ORAL EVERY 6 HOURS PRN
OUTPATIENT
Start: 2025-07-01

## 2025-06-27 RX ORDER — FAMOTIDINE 10 MG/ML
20 INJECTION, SOLUTION INTRAVENOUS ONCE AS NEEDED
OUTPATIENT
Start: 2025-07-01

## 2025-06-27 RX ORDER — PROCHLORPERAZINE EDISYLATE 5 MG/ML
10 INJECTION INTRAMUSCULAR; INTRAVENOUS EVERY 6 HOURS PRN
OUTPATIENT
Start: 2025-07-01

## 2025-06-27 RX ORDER — ALBUTEROL SULFATE 0.83 MG/ML
3 SOLUTION RESPIRATORY (INHALATION) AS NEEDED
OUTPATIENT
Start: 2025-07-01

## 2025-06-27 ASSESSMENT — ENCOUNTER SYMPTOMS
ABDOMINAL PAIN: 0
EYES NEGATIVE: 1
FEVER: 0
VOMITING: 0
NEUROLOGICAL NEGATIVE: 1
MUSCULOSKELETAL NEGATIVE: 1
SLEEP DISTURBANCE: 1
ENDOCRINE NEGATIVE: 1
NAUSEA: 0
HEMATOLOGIC/LYMPHATIC NEGATIVE: 1
CHILLS: 0
CARDIOVASCULAR NEGATIVE: 1
RESPIRATORY NEGATIVE: 1

## 2025-06-27 NOTE — PROGRESS NOTES
Patient ID: Hemant Ness is a 64 y.o. male.  Date of Service: 06/27/25  Referring Physician: Tai Ramon MD  [id number]  [place] [phone]  Primary Care Provider: Jose Juan Mukherjee DO      Subjective    Oncology History   HCC (hepatocellular carcinoma)   6/2024 Initial Diagnosis    Hepatocellular carcinoma  History of alcohol use and hepatitis C and cirrhosis was found to have 6.6 cm lesion in the left hepatic lobe.  Biopsy of the liver lesion on 6/17/2024 was consistent with moderately differentiated hepatocellular cancer in the background of cirrhosis.  aFP was 35 on 6/11/2024 9/16/2024 Procedure    Y90 radioembolization to segment 8 of liver     10/29/2024 Procedure    Y90 radioembolization of the posterior branch of right hepatic artery     2/19/2025 Recurrence    MRI liver showed multifocal bilobar liver lesions with largest measuring 2.8 cm in segment 3     4/29/2025 -  Chemotherapy    Atezolizumab + Bevacizumab, 21 Day Cycles         HPI  Virtual visit  Mr. Ness returns in follow up today.  He is receiving atezolizumab plus bevacizumab.  He is tolerating the therapy very well.  He denies any new complaint.      Review of Systems   Constitutional:  Negative for chills and fever.   HENT:  Negative.     Eyes: Negative.    Respiratory: Negative.     Cardiovascular: Negative.    Gastrointestinal:  Negative for abdominal pain, nausea and vomiting.   Endocrine: Negative.    Genitourinary: Negative.     Musculoskeletal: Negative.    Skin:  Positive for itching.   Neurological: Negative.    Hematological: Negative.    Psychiatric/Behavioral:  Positive for sleep disturbance.    All other systems reviewed and are negative.         Patient Active Problem List   Diagnosis    Type 2 diabetes mellitus with hyperglycemia, without long-term current use of insulin    Actinic keratoses    BPH with elevated PSA    CAD (coronary artery disease)    Current smoker    Dyslipidemia associated with type 2  diabetes mellitus    Hypertensive heart disease with chronic diastolic congestive heart failure    Sleep-disordered breathing    Uncomplicated alcohol dependence (Multi)    Diabetes mellitus type 2 with peripheral artery disease    Cirrhosis of liver (Multi)    Situational anxiety    Adenomatous polyp of ascending colon    Acute cervical radiculopathy    HCC (hepatocellular carcinoma)    Hepatitis A and hepatitis B vaccine administered    Class 2 severe obesity due to excess calories with serious comorbidity and body mass index (BMI) of 35.0 to 35.9 in adult    Stented coronary artery    Anticoagulant long-term use     Past Medical History:   Diagnosis Date    Chronic hepatitis C without hepatic coma (Multi) 04/15/2024    Cirrhosis (Multi)     Coronary artery disease     Diabetes mellitus (Multi)     Hepatitis C     Hyperlipidemia, unspecified 12/17/2021    Dyslipidemia    Hypertension     Personal history of other endocrine, nutritional and metabolic disease 12/17/2021    History of diabetes mellitus    Personal history of other endocrine, nutritional and metabolic disease 12/17/2021    History of type 2 diabetes mellitus    Personal history of other endocrine, nutritional and metabolic disease 04/08/2022    History of type 2 diabetes mellitus    Personal history of other specified conditions 09/10/2021    History of chest pain    STEMI (ST elevation myocardial infarction) (Multi) 09/07/2023     Past Surgical History:   Procedure Laterality Date    OTHER SURGICAL HISTORY  07/23/2021    Cardiac catheterization with stent placement    OTHER SURGICAL HISTORY  07/29/2021    Cyst excision    OTHER SURGICAL HISTORY  07/29/2021    Cholecystectomy    OTHER SURGICAL HISTORY  07/29/2021    Shoulder surgery     Family History   Family history unknown: Yes       Current Outpatient Medications:     aspirin 81 mg EC tablet, Take 1 tablet (81 mg) by mouth once daily., Disp: , Rfl:     cyanocobalamin (Vitamin B-12) 1,000 mcg  tablet, Take 1 tablet (1,000 mcg) by mouth once daily., Disp: , Rfl:     dulaglutide (Trulicity) 4.5 mg/0.5 mL pen injector, INJECT ONE PEN (4.5 MG) UNDER THE SKIN ONCE WEEKLY, Disp: 2 mL, Rfl: 11    empagliflozin (Jardiance) 25 mg, TAKE 1 TABLET (25 MG) BY MOUTH ONCE DAILY., Disp: 90 tablet, Rfl: 3    gabapentin (Neurontin) 100 mg capsule, Take 1 capsule (100 mg) by mouth once daily at bedtime., Disp: 90 capsule, Rfl: 3    hydrocortisone 1 % lotion, Apply topically 2 times a day., Disp: 96 g, Rfl: 2    loperamide (Imodium) 2 mg capsule, Take 2 capsules (4 mg) by mouth with the first episode of diarrhea and 1 capsule (2 mg) by mouth with any additional episodes. Maximum 8 capsules (16 mg) per day., Disp: 100 capsule, Rfl: 2    LORazepam (Ativan) 0.5 mg tablet, Take 1 tablet (0.5 mg) by mouth if needed (for MRI testing (claustrophobia))., Disp: , Rfl:     losartan (Cozaar) 100 mg tablet, TAKE ONE TABLET BY MOUTH ONCE EVERY DAY, Disp: 90 tablet, Rfl: 0    metoprolol succinate XL (Toprol-XL) 100 mg 24 hr tablet, TAKE ONE TABLET BY MOUTH ONCE DAILY. DO NOT CRUSH OR CHEW, Disp: 90 tablet, Rfl: 3    nitroglycerin (Nitrostat) 0.4 mg SL tablet, Place under the tongue., Disp: , Rfl:     OneTouch Delica Plus Lancet 30 gauge misc, , Disp: , Rfl:     OneTouch Ultra Test strip, , Disp: , Rfl:     OneTouch Ultra2 Meter misc, , Disp: , Rfl:     prochlorperazine (Compazine) 10 mg tablet, Take 1 tablet (10 mg) by mouth every 6 hours if needed for nausea or vomiting., Disp: 30 tablet, Rfl: 5    rosuvastatin (Crestor) 10 mg tablet, TAKE ONE TABLET BY MOUTH once DAILY, Disp: 100 tablet, Rfl: 0    traZODone (Desyrel) 50 mg tablet, Take 1 tablet (50 mg) by mouth once daily at bedtime., Disp: 30 tablet, Rfl: 0      Objective   BSA: There is no height or weight on file to calculate BSA.  There were no vitals taken for this visit.    Performance Status:  Asymptomatic    Physical Exam    Virtual visit  === 04/23/25 ===    CT CHEST WO IV  CONTRAST    - Impression -  1.  No thoracic metastatic disease identified.  2. Right lower lobe nodule seen previously has resolved. A few  additional nodules are stable and probably scars.      MACRO:  None.    Signed by: Dominic Campbell 4/24/2025 10:41 AM  Dictation workstation:   [id number]    === 02/19/25 ===    MR LIVER WO AND W CONTRAST    - Impression -  1.  Status post interval Y 90 treatment with expected posttreatment  changes at the ablation zone, with prominent peripheral enhancement,  likely tumor related which could obscure residual disease, LR TR  equivocal.  2. Multifocal newly seen suspicious bilobar enhancing liver lesions  including LI-RADS 5 lesions and too small to characterize nonspecific  lesions.  3. Similar prominent hepatic lymph node.      Signed by: Dharmesh Kelley 2/19/2025 3:27 PM  Dictation workstation:   [id number]      Assessment/Plan       #1 cirrhosis related to hepatitis C, treated and alcohol use  #2 multifocal HCC    Patient is a 64 y.o. male with cirrhosis related to treated hepatitis C and alcohol use.  Patient presented with multifocal HCC. He has been treated with Y 90 x 2 in the past.  Unfortunately, he had recurrent multifocal disease.  Tumor board recommendation was to consider systemic therapy.  He  started treatment with atezolizumab plus bevacizumab on 4/29/25.     He has completed 3 cycles.  He is scheduled for cycle #4 next week.  At this time we will recommend continuing the current regimen as he is tolerating the treatment well.  I also ordered restaging scans which will be done prior to next cycle.  He will return for evaluation prior to cycle #5    Plan was discussed in detail with the patient and patient was in agreement with the plan of care. Mr. Ness knows to call with any issues or concerns.            Tai Ramon MD

## 2025-07-01 ENCOUNTER — INFUSION (OUTPATIENT)
Dept: HEMATOLOGY/ONCOLOGY | Facility: CLINIC | Age: 65
End: 2025-07-01
Payer: COMMERCIAL

## 2025-07-01 VITALS
RESPIRATION RATE: 16 BRPM | TEMPERATURE: 98.1 F | DIASTOLIC BLOOD PRESSURE: 85 MMHG | SYSTOLIC BLOOD PRESSURE: 158 MMHG | HEART RATE: 89 BPM | WEIGHT: 231.1 LBS | BODY MASS INDEX: 34.23 KG/M2 | HEIGHT: 69 IN | OXYGEN SATURATION: 96 %

## 2025-07-01 DIAGNOSIS — C22.0 HCC (HEPATOCELLULAR CARCINOMA): ICD-10-CM

## 2025-07-01 PROCEDURE — 96413 CHEMO IV INFUSION 1 HR: CPT

## 2025-07-01 PROCEDURE — 2500000004 HC RX 250 GENERAL PHARMACY W/ HCPCS (ALT 636 FOR OP/ED): Mod: JZ,TB | Performed by: INTERNAL MEDICINE

## 2025-07-01 PROCEDURE — 96417 CHEMO IV INFUS EACH ADDL SEQ: CPT

## 2025-07-01 RX ORDER — ALBUTEROL SULFATE 0.83 MG/ML
3 SOLUTION RESPIRATORY (INHALATION) AS NEEDED
Status: DISCONTINUED | OUTPATIENT
Start: 2025-07-01 | End: 2025-07-01 | Stop reason: HOSPADM

## 2025-07-01 RX ORDER — EPINEPHRINE 0.3 MG/.3ML
0.3 INJECTION SUBCUTANEOUS EVERY 5 MIN PRN
Status: DISCONTINUED | OUTPATIENT
Start: 2025-07-01 | End: 2025-07-01 | Stop reason: HOSPADM

## 2025-07-01 RX ORDER — FAMOTIDINE 10 MG/ML
20 INJECTION, SOLUTION INTRAVENOUS ONCE AS NEEDED
Status: DISCONTINUED | OUTPATIENT
Start: 2025-07-01 | End: 2025-07-01 | Stop reason: HOSPADM

## 2025-07-01 RX ORDER — DIPHENHYDRAMINE HYDROCHLORIDE 50 MG/ML
50 INJECTION, SOLUTION INTRAMUSCULAR; INTRAVENOUS AS NEEDED
Status: DISCONTINUED | OUTPATIENT
Start: 2025-07-01 | End: 2025-07-01 | Stop reason: HOSPADM

## 2025-07-01 RX ORDER — HEPARIN SODIUM,PORCINE/PF 10 UNIT/ML
50 SYRINGE (ML) INTRAVENOUS AS NEEDED
OUTPATIENT
Start: 2025-07-01

## 2025-07-01 RX ORDER — PROCHLORPERAZINE MALEATE 10 MG
10 TABLET ORAL EVERY 6 HOURS PRN
Status: DISCONTINUED | OUTPATIENT
Start: 2025-07-01 | End: 2025-07-01 | Stop reason: HOSPADM

## 2025-07-01 RX ORDER — PROCHLORPERAZINE EDISYLATE 5 MG/ML
10 INJECTION INTRAMUSCULAR; INTRAVENOUS EVERY 6 HOURS PRN
Status: DISCONTINUED | OUTPATIENT
Start: 2025-07-01 | End: 2025-07-01 | Stop reason: HOSPADM

## 2025-07-01 RX ORDER — HEPARIN 100 UNIT/ML
500 SYRINGE INTRAVENOUS AS NEEDED
OUTPATIENT
Start: 2025-07-01

## 2025-07-01 RX ADMIN — ATEZOLIZUMAB 1200 MG: 1200 INJECTION, SOLUTION INTRAVENOUS at 10:13

## 2025-07-01 RX ADMIN — BEVACIZUMAB-AWWB 1600 MG: 400 INJECTION, SOLUTION INTRAVENOUS at 10:54

## 2025-07-01 ASSESSMENT — PAIN SCALES - GENERAL: PAINLEVEL_OUTOF10: 0-NO PAIN

## 2025-07-01 NOTE — PROGRESS NOTES
Pt arrived awake, alert, oriented, no apparent distress with unlabored breaths. Pt reports feeling well today without s/sx of acute illness. Pt does report overwhelming anxiety with PIV placements. Pt here for day 1, cycle 4, in which he received and tolerated well as of thus far. Pt with next appointment set for 7/18/25 for FUV, and 7/22/25 for next infusion. Pt without further questions or concerns, discharged in stable condition

## 2025-07-01 NOTE — SIGNIFICANT EVENT

## 2025-07-02 NOTE — PROGRESS NOTES
Music Therapy Note    Hemant Ness was referred by [facility] rounding    Therapy Session  Referral Type: New referral this admission (Infusion center rounds)  Visit Type: New visit  Session Start Time: 1037  Session End Time: 1040  Intervention Delivery: In-person  Conflict of Service: Declined treatment     Pre-assessment  Unable to Assess Reason: Outcomes not applicable         Treatment/Interventions  Music Therapy Interventions: Assessment    Post-assessment  Total Session Time (min): 3 minutes    Narrative  Assessment Detail: Pt. sitting in infusion center room, alert and with appropriate affect  Plan: MT introduced music therapy services to pt. for during his treatment to assist with coping, relaxation, and positive refocus  Evaluation: Pt. appreciative of MT stopping in, but declined for today saying he has about a half hour left and isn't in a great mood. MT expressed understanding  Follow-up: Will f/u with pt. as able    Education Documentation  Coping Strategies, taught by Mariaa Morrison at 7/2/2025  9:50 AM.  Learner: Patient  Readiness: Acceptance  Method: Explanation  Response: Demonstrated Understanding    Relaxation, taught by Mariaa Morrison at 7/2/2025  9:50 AM.  Learner: Patient  Readiness: Acceptance  Method: Explanation  Response: Demonstrated Understanding    Focal Points, taught by Mariaa Morrison at 7/2/2025  9:50 AM.  Learner: Patient  Readiness: Acceptance  Method: Explanation  Response: Demonstrated Understanding

## 2025-07-07 ENCOUNTER — NURSE TRIAGE (OUTPATIENT)
Dept: ADMISSION | Facility: HOSPITAL | Age: 65
End: 2025-07-07
Payer: COMMERCIAL

## 2025-07-07 NOTE — TELEPHONE ENCOUNTER
Pt called reporting diarrhea which started about 5 days ago.  Yesterday he estimates he had between 8-10 liquid, non-bloody stools.  He had been having some nausea and stomach pain which are improved today.  He used imodium as directed but unsure if it helped at all.    He denies fevers, chest pain, difficulty breathing or s/s of dehydration.  He ate a sandwich early this morning and has not had any diarrhea as yet today.    Pt referenced discussion about diverticulosis on his last CT scan and asks if diarrhea is related to that vs treatment?    He has CT scans scheduled for 7/11 and follow up on 7/18.

## 2025-07-07 NOTE — TELEPHONE ENCOUNTER
Per Dr. Ramon, pt should continue supportive care with imodium and increased fluid intake and come in to [facility].  Discussed with pt who reports no further diarrhea today.  He feels well and is back to his usual activities such as mowing the grass.  No further needs at this time and he will call back if diarrhea returns.

## 2025-07-11 ENCOUNTER — HOSPITAL ENCOUNTER (OUTPATIENT)
Dept: RADIOLOGY | Facility: CLINIC | Age: 65
Discharge: HOME | End: 2025-07-11
Payer: COMMERCIAL

## 2025-07-11 DIAGNOSIS — C22.0 HCC (HEPATOCELLULAR CARCINOMA): ICD-10-CM

## 2025-07-11 PROCEDURE — 71250 CT THORAX DX C-: CPT

## 2025-07-11 PROCEDURE — 2550000001 HC RX 255 CONTRASTS: Performed by: INTERNAL MEDICINE

## 2025-07-11 PROCEDURE — 74170 CT ABD WO CNTRST FLWD CNTRST: CPT

## 2025-07-11 RX ADMIN — IOHEXOL 75 ML: 350 INJECTION, SOLUTION INTRAVENOUS at 11:50

## 2025-07-18 ENCOUNTER — OFFICE VISIT (OUTPATIENT)
Dept: HEMATOLOGY/ONCOLOGY | Facility: CLINIC | Age: 65
End: 2025-07-18
Payer: COMMERCIAL

## 2025-07-18 ENCOUNTER — LAB (OUTPATIENT)
Dept: LAB | Facility: CLINIC | Age: 65
End: 2025-07-18
Payer: COMMERCIAL

## 2025-07-18 VITALS
SYSTOLIC BLOOD PRESSURE: 158 MMHG | OXYGEN SATURATION: 98 % | TEMPERATURE: 97.2 F | RESPIRATION RATE: 18 BRPM | BODY MASS INDEX: 34.67 KG/M2 | WEIGHT: 234.1 LBS | DIASTOLIC BLOOD PRESSURE: 92 MMHG | HEART RATE: 79 BPM

## 2025-07-18 DIAGNOSIS — C22.0 HCC (HEPATOCELLULAR CARCINOMA): ICD-10-CM

## 2025-07-18 DIAGNOSIS — C22.0 HCC (HEPATOCELLULAR CARCINOMA): Primary | ICD-10-CM

## 2025-07-18 LAB
AFP SERPL-MCNC: 5 NG/ML (ref 0–9)
ALBUMIN SERPL BCP-MCNC: 4 G/DL (ref 3.4–5)
ALP SERPL-CCNC: 67 U/L (ref 33–136)
ALT SERPL W P-5'-P-CCNC: 20 U/L (ref 10–52)
ANION GAP SERPL CALC-SCNC: 14 MMOL/L (ref 10–20)
AST SERPL W P-5'-P-CCNC: 20 U/L (ref 9–39)
BASOPHILS # BLD AUTO: 0.03 X10*3/UL (ref 0–0.1)
BASOPHILS NFR BLD AUTO: 0.4 %
BILIRUB SERPL-MCNC: 0.4 MG/DL (ref 0–1.2)
BUN SERPL-MCNC: 13 MG/DL (ref 6–23)
CALCIUM SERPL-MCNC: 9.7 MG/DL (ref 8.6–10.6)
CHLORIDE SERPL-SCNC: 105 MMOL/L (ref 98–107)
CO2 SERPL-SCNC: 26 MMOL/L (ref 21–32)
CREAT SERPL-MCNC: 0.97 MG/DL (ref 0.5–1.3)
EGFRCR SERPLBLD CKD-EPI 2021: 86 ML/MIN/1.73M*2
EOSINOPHIL # BLD AUTO: 0.81 X10*3/UL (ref 0–0.7)
EOSINOPHIL NFR BLD AUTO: 9.7 %
ERYTHROCYTE [DISTWIDTH] IN BLOOD BY AUTOMATED COUNT: 15.3 % (ref 11.5–14.5)
GLUCOSE SERPL-MCNC: 112 MG/DL (ref 74–99)
HCT VFR BLD AUTO: 47.2 % (ref 41–52)
HGB BLD-MCNC: 15.4 G/DL (ref 13.5–17.5)
IMM GRANULOCYTES # BLD AUTO: 0.03 X10*3/UL (ref 0–0.7)
IMM GRANULOCYTES NFR BLD AUTO: 0.4 % (ref 0–0.9)
LYMPHOCYTES # BLD AUTO: 0.7 X10*3/UL (ref 1.2–4.8)
LYMPHOCYTES NFR BLD AUTO: 8.4 %
MCH RBC QN AUTO: 32.2 PG (ref 26–34)
MCHC RBC AUTO-ENTMCNC: 32.6 G/DL (ref 32–36)
MCV RBC AUTO: 99 FL (ref 80–100)
MONOCYTES # BLD AUTO: 0.76 X10*3/UL (ref 0.1–1)
MONOCYTES NFR BLD AUTO: 9.1 %
NEUTROPHILS # BLD AUTO: 6.04 X10*3/UL (ref 1.2–7.7)
NEUTROPHILS NFR BLD AUTO: 72 %
NRBC BLD-RTO: ABNORMAL /100{WBCS}
PLATELET # BLD AUTO: 143 X10*3/UL (ref 150–450)
POTASSIUM SERPL-SCNC: 4.5 MMOL/L (ref 3.5–5.3)
PROT SERPL-MCNC: 6.8 G/DL (ref 6.4–8.2)
RBC # BLD AUTO: 4.79 X10*6/UL (ref 4.5–5.9)
SODIUM SERPL-SCNC: 140 MMOL/L (ref 136–145)
TSH SERPL-ACNC: 2.85 MIU/L (ref 0.44–3.98)
WBC # BLD AUTO: 8.4 X10*3/UL (ref 4.4–11.3)

## 2025-07-18 PROCEDURE — 1159F MED LIST DOCD IN RCRD: CPT

## 2025-07-18 PROCEDURE — 99215 OFFICE O/P EST HI 40 MIN: CPT

## 2025-07-18 PROCEDURE — 82105 ALPHA-FETOPROTEIN SERUM: CPT

## 2025-07-18 PROCEDURE — 80053 COMPREHEN METABOLIC PANEL: CPT

## 2025-07-18 PROCEDURE — 3077F SYST BP >= 140 MM HG: CPT

## 2025-07-18 PROCEDURE — 84443 ASSAY THYROID STIM HORMONE: CPT

## 2025-07-18 PROCEDURE — 3080F DIAST BP >= 90 MM HG: CPT

## 2025-07-18 PROCEDURE — 82533 TOTAL CORTISOL: CPT

## 2025-07-18 PROCEDURE — 36415 COLL VENOUS BLD VENIPUNCTURE: CPT

## 2025-07-18 PROCEDURE — 1125F AMNT PAIN NOTED PAIN PRSNT: CPT

## 2025-07-18 PROCEDURE — 4010F ACE/ARB THERAPY RXD/TAKEN: CPT

## 2025-07-18 PROCEDURE — 1160F RVW MEDS BY RX/DR IN RCRD: CPT

## 2025-07-18 PROCEDURE — 85025 COMPLETE CBC W/AUTO DIFF WBC: CPT

## 2025-07-18 RX ORDER — DIPHENHYDRAMINE HYDROCHLORIDE 50 MG/ML
50 INJECTION, SOLUTION INTRAMUSCULAR; INTRAVENOUS AS NEEDED
OUTPATIENT
Start: 2025-07-22

## 2025-07-18 RX ORDER — PANTOPRAZOLE SODIUM 40 MG/1
40 TABLET, DELAYED RELEASE ORAL DAILY
Qty: 30 TABLET | Refills: 5 | Status: SHIPPED | OUTPATIENT
Start: 2025-07-18 | End: 2026-01-14

## 2025-07-18 RX ORDER — PROCHLORPERAZINE EDISYLATE 5 MG/ML
10 INJECTION INTRAMUSCULAR; INTRAVENOUS EVERY 6 HOURS PRN
OUTPATIENT
Start: 2025-07-22

## 2025-07-18 RX ORDER — ALBUTEROL SULFATE 0.83 MG/ML
3 SOLUTION RESPIRATORY (INHALATION) AS NEEDED
OUTPATIENT
Start: 2025-07-22

## 2025-07-18 RX ORDER — PROCHLORPERAZINE MALEATE 10 MG
10 TABLET ORAL EVERY 6 HOURS PRN
OUTPATIENT
Start: 2025-07-22

## 2025-07-18 RX ORDER — DIPHENOXYLATE HYDROCHLORIDE AND ATROPINE SULFATE 2.5; .025 MG/1; MG/1
1 TABLET ORAL 4 TIMES DAILY PRN
Qty: 30 TABLET | Refills: 1 | Status: SHIPPED | OUTPATIENT
Start: 2025-07-18

## 2025-07-18 RX ORDER — FAMOTIDINE 10 MG/ML
20 INJECTION, SOLUTION INTRAVENOUS ONCE AS NEEDED
OUTPATIENT
Start: 2025-07-22

## 2025-07-18 RX ORDER — EPINEPHRINE 0.3 MG/.3ML
0.3 INJECTION SUBCUTANEOUS EVERY 5 MIN PRN
OUTPATIENT
Start: 2025-07-22

## 2025-07-18 ASSESSMENT — ENCOUNTER SYMPTOMS
CHILLS: 0
NAUSEA: 1
CARDIOVASCULAR NEGATIVE: 1
VOMITING: 0
FEVER: 0
FATIGUE: 1
ABDOMINAL PAIN: 0
HEMATOLOGIC/LYMPHATIC NEGATIVE: 1
DIARRHEA: 1
MUSCULOSKELETAL NEGATIVE: 1
NEUROLOGICAL NEGATIVE: 1
SLEEP DISTURBANCE: 0
RESPIRATORY NEGATIVE: 1
EYES NEGATIVE: 1
ENDOCRINE NEGATIVE: 1

## 2025-07-18 ASSESSMENT — PAIN SCALES - GENERAL: PAINLEVEL_OUTOF10: 7

## 2025-07-18 NOTE — PROGRESS NOTES
Patient ID: Hemant Ness is a 64 y.o. male.  Date of Service: 07/18/25  Referring Physician: Tai Ramon MD  [id number]  [place] [id number]  Primary Care Provider: Jose Juan Mukherjee DO      Subjective    Oncology History   HCC (hepatocellular carcinoma)   6/2024 Initial Diagnosis    Hepatocellular carcinoma  History of alcohol use and hepatitis C and cirrhosis was found to have 6.6 cm lesion in the left hepatic lobe.  Biopsy of the liver lesion on 6/17/2024 was consistent with moderately differentiated hepatocellular cancer in the background of cirrhosis.  aFP was 35 on 6/11/2024 9/16/2024 Procedure    Y90 radioembolization to segment 8 of liver     10/29/2024 Procedure    Y90 radioembolization of the posterior branch of right hepatic artery     2/19/2025 Recurrence    MRI liver showed multifocal bilobar liver lesions with largest measuring 2.8 cm in segment 3     4/29/2025 -  Chemotherapy    Atezolizumab + Bevacizumab, 21 Day Cycles         HPI  Mr. Ness returns in follow up today.  He is receiving atezolizumab plus bevacizumab.  He is tolerating the therapy fairly well. Following last treatment, he did report issues with diarrhea and nausea. Did use imodium and modified his diet. Diarrhea eventually resolved. He denies any issues with this today. Nausea improved as well and he is back to eating regularly.  He denies any other new complaint.      Review of Systems   Constitutional:  Positive for fatigue. Negative for chills and fever.   HENT:  Negative.     Eyes: Negative.    Respiratory: Negative.     Cardiovascular: Negative.    Gastrointestinal:  Positive for diarrhea and nausea. Negative for abdominal pain and vomiting.   Endocrine: Negative.    Genitourinary: Negative.     Musculoskeletal: Negative.    Skin:  Positive for itching.   Neurological: Negative.    Hematological: Negative.    Psychiatric/Behavioral:  Negative for sleep disturbance.    All other systems reviewed and are  negative.         Patient Active Problem List   Diagnosis    Type 2 diabetes mellitus with hyperglycemia, without long-term current use of insulin    Actinic keratoses    BPH with elevated PSA    CAD (coronary artery disease)    Current smoker    Dyslipidemia associated with type 2 diabetes mellitus    Hypertensive heart disease with chronic diastolic congestive heart failure    Sleep-disordered breathing    Uncomplicated alcohol dependence (Multi)    Diabetes mellitus type 2 with peripheral artery disease    Cirrhosis of liver (Multi)    Situational anxiety    Adenomatous polyp of ascending colon    Acute cervical radiculopathy    HCC (hepatocellular carcinoma)    Hepatitis A and hepatitis B vaccine administered    Class 2 severe obesity due to excess calories with serious comorbidity and body mass index (BMI) of 35.0 to 35.9 in adult    Stented coronary artery    Anticoagulant long-term use     Past Medical History:   Diagnosis Date    Chronic hepatitis C without hepatic coma (Multi) 04/15/2024    Cirrhosis (Multi)     Coronary artery disease     Diabetes mellitus (Multi)     Hepatitis C     Hyperlipidemia, unspecified 12/17/2021    Dyslipidemia    Hypertension     Personal history of other endocrine, nutritional and metabolic disease 12/17/2021    History of diabetes mellitus    Personal history of other endocrine, nutritional and metabolic disease 12/17/2021    History of type 2 diabetes mellitus    Personal history of other endocrine, nutritional and metabolic disease 04/08/2022    History of type 2 diabetes mellitus    Personal history of other specified conditions 09/10/2021    History of chest pain    STEMI (ST elevation myocardial infarction) (Multi) 09/07/2023     Past Surgical History:   Procedure Laterality Date    OTHER SURGICAL HISTORY  07/23/2021    Cardiac catheterization with stent placement    OTHER SURGICAL HISTORY  07/29/2021    Cyst excision    OTHER SURGICAL HISTORY  07/29/2021     Cholecystectomy    OTHER SURGICAL HISTORY  07/29/2021    Shoulder surgery     Family History   Family history unknown: Yes       Current Outpatient Medications:     aspirin 81 mg EC tablet, Take 1 tablet (81 mg) by mouth once daily., Disp: , Rfl:     cyanocobalamin (Vitamin B-12) 1,000 mcg tablet, Take 1 tablet (1,000 mcg) by mouth once daily., Disp: , Rfl:     dulaglutide (Trulicity) 4.5 mg/0.5 mL pen injector, INJECT ONE PEN (4.5 MG) UNDER THE SKIN ONCE WEEKLY, Disp: 2 mL, Rfl: 11    empagliflozin (Jardiance) 25 mg, TAKE 1 TABLET (25 MG) BY MOUTH ONCE DAILY., Disp: 90 tablet, Rfl: 3    gabapentin (Neurontin) 100 mg capsule, Take 1 capsule (100 mg) by mouth once daily at bedtime., Disp: 90 capsule, Rfl: 3    hydrocortisone 1 % lotion, Apply topically 2 times a day., Disp: 96 g, Rfl: 2    loperamide (Imodium) 2 mg capsule, Take 2 capsules (4 mg) by mouth with the first episode of diarrhea and 1 capsule (2 mg) by mouth with any additional episodes. Maximum 8 capsules (16 mg) per day., Disp: 100 capsule, Rfl: 2    LORazepam (Ativan) 0.5 mg tablet, Take 1 tablet (0.5 mg) by mouth if needed (for MRI testing (claustrophobia))., Disp: , Rfl:     losartan (Cozaar) 100 mg tablet, TAKE ONE TABLET BY MOUTH ONCE EVERY DAY, Disp: 90 tablet, Rfl: 0    metoprolol succinate XL (Toprol-XL) 100 mg 24 hr tablet, TAKE ONE TABLET BY MOUTH ONCE DAILY. DO NOT CRUSH OR CHEW, Disp: 90 tablet, Rfl: 3    nitroglycerin (Nitrostat) 0.4 mg SL tablet, Place under the tongue., Disp: , Rfl:     OneTouch Delica Plus Lancet 30 gauge misc, , Disp: , Rfl:     OneTouch Ultra Test strip, , Disp: , Rfl:     OneTouch Ultra2 Meter misc, , Disp: , Rfl:     prochlorperazine (Compazine) 10 mg tablet, Take 1 tablet (10 mg) by mouth every 6 hours if needed for nausea or vomiting., Disp: 30 tablet, Rfl: 5    rosuvastatin (Crestor) 10 mg tablet, TAKE ONE TABLET BY MOUTH once DAILY, Disp: 100 tablet, Rfl: 0    diphenoxylate-atropine (LomotiL) 2.5-0.025 mg tablet,  Take 1 tablet by mouth 4 times a day as needed for diarrhea., Disp: 30 tablet, Rfl: 1    pantoprazole (ProtoNix) 40 mg EC tablet, Take 1 tablet (40 mg) by mouth once daily. Do not crush, chew, or split., Disp: 30 tablet, Rfl: 5    traZODone (Desyrel) 50 mg tablet, Take 1 tablet (50 mg) by mouth once daily at bedtime., Disp: 30 tablet, Rfl: 0      Objective   BSA: 2.27 meters squared  BP (!) 158/92   Pulse 79   Temp 36.2 °C (97.2 °F)   Resp 18   Wt 106 kg (234 lb 1.6 oz)   SpO2 98%   BMI 34.67 kg/m²     Performance Status:  Symptomatic, but fully ambulatory     Physical Exam  Vitals reviewed.   Constitutional:       General: He is not in acute distress.     Appearance: Normal appearance.   HENT:      Head: Normocephalic and atraumatic.      Mouth/Throat:      Mouth: Mucous membranes are moist.      Pharynx: Oropharynx is clear.     Eyes:      Conjunctiva/sclera: Conjunctivae normal.      Pupils: Pupils are equal, round, and reactive to light.       Cardiovascular:      Rate and Rhythm: Normal rate and regular rhythm.      Pulses: Normal pulses.      Heart sounds: Normal heart sounds.   Pulmonary:      Effort: Pulmonary effort is normal.      Breath sounds: Normal breath sounds.   Abdominal:      General: Bowel sounds are normal.      Palpations: Abdomen is soft.      Tenderness: There is no abdominal tenderness.     Musculoskeletal:         General: Normal range of motion.      Cervical back: Normal range of motion and neck supple.     Skin:     General: Skin is warm and dry.     Neurological:      Mental Status: He is alert and oriented to person, place, and time.     Psychiatric:         Mood and Affect: Mood normal.         Behavior: Behavior normal.       === 04/23/25 ===    CT CHEST WO IV CONTRAST    - Impression -  1.  No thoracic metastatic disease identified.  2. Right lower lobe nodule seen previously has resolved. A few  additional nodules are stable and probably scars.      MACRO:  None.    Signed  by: Dominic Campbell 4/24/2025 10:41 AM  Dictation workstation:   [id number]    === 02/19/25 ===    MR LIVER WO AND W CONTRAST    - Impression -  1.  Status post interval Y 90 treatment with expected posttreatment  changes at the ablation zone, with prominent peripheral enhancement,  likely tumor related which could obscure residual disease, LR TR  equivocal.  2. Multifocal newly seen suspicious bilobar enhancing liver lesions  including LI-RADS 5 lesions and too small to characterize nonspecific  lesions.  3. Similar prominent hepatic lymph node.      Signed by: Dharmesh Kelley 2/19/2025 3:27 PM  Dictation workstation:   [id number]      Assessment/Plan      #1 cirrhosis related to hepatitis C, treated and alcohol use  #2 multifocal HCC    Patient is a 64 y.o. male with cirrhosis related to treated hepatitis C and alcohol use.  Patient presented with multifocal HCC. He has been treated with Y 90 x 2 in the past.  Unfortunately, he had recurrent multifocal disease.  Tumor board recommendation was to consider systemic therapy.  He  started treatment with atezolizumab plus bevacizumab on 4/29/25.     He has completed 4 cycles.  He is scheduled for cycle #5 next week. Restaging scans after cycle 4 show a stable response to treatment. At this time, we will recommend continuing the current regimen. I did send lomotil to his pharmacy for any future diarrhea issues to use with imodium. He will return for evaluation prior to cycle #6    Plan was discussed in detail with the patient and he was in agreement with the plan of care. Elio Ness knows to call with any issues or concerns.       Ynes Garcia, APRN-CNP

## 2025-07-19 LAB — CORTIS AM PEAK SERPL-MSCNC: 10.3 UG/DL (ref 5–20)

## 2025-07-22 ENCOUNTER — INFUSION (OUTPATIENT)
Dept: HEMATOLOGY/ONCOLOGY | Facility: CLINIC | Age: 65
End: 2025-07-22
Payer: COMMERCIAL

## 2025-07-22 VITALS
RESPIRATION RATE: 16 BRPM | OXYGEN SATURATION: 96 % | WEIGHT: 233.2 LBS | BODY MASS INDEX: 34.54 KG/M2 | TEMPERATURE: 97.9 F | HEIGHT: 69 IN | HEART RATE: 80 BPM | SYSTOLIC BLOOD PRESSURE: 146 MMHG | DIASTOLIC BLOOD PRESSURE: 90 MMHG

## 2025-07-22 DIAGNOSIS — C22.0 HCC (HEPATOCELLULAR CARCINOMA): ICD-10-CM

## 2025-07-22 LAB
APPEARANCE UR: CLEAR
BILIRUB UR STRIP.AUTO-MCNC: NEGATIVE MG/DL
COLOR UR: ABNORMAL
GLUCOSE UR STRIP.AUTO-MCNC: ABNORMAL MG/DL
KETONES UR STRIP.AUTO-MCNC: NEGATIVE MG/DL
LEUKOCYTE ESTERASE UR QL STRIP.AUTO: NEGATIVE
NITRITE UR QL STRIP.AUTO: NEGATIVE
PH UR STRIP.AUTO: 7 [PH]
PROT UR STRIP.AUTO-MCNC: NEGATIVE MG/DL
RBC # UR STRIP.AUTO: NEGATIVE MG/DL
SP GR UR STRIP.AUTO: <1.005
UROBILINOGEN UR STRIP.AUTO-MCNC: ABNORMAL MG/DL

## 2025-07-22 PROCEDURE — 81003 URINALYSIS AUTO W/O SCOPE: CPT

## 2025-07-22 PROCEDURE — 96417 CHEMO IV INFUS EACH ADDL SEQ: CPT

## 2025-07-22 PROCEDURE — 96413 CHEMO IV INFUSION 1 HR: CPT

## 2025-07-22 PROCEDURE — 2500000004 HC RX 250 GENERAL PHARMACY W/ HCPCS (ALT 636 FOR OP/ED): Mod: JZ,TB

## 2025-07-22 RX ORDER — DIPHENHYDRAMINE HYDROCHLORIDE 50 MG/ML
50 INJECTION, SOLUTION INTRAMUSCULAR; INTRAVENOUS AS NEEDED
Status: DISCONTINUED | OUTPATIENT
Start: 2025-07-22 | End: 2025-07-22 | Stop reason: HOSPADM

## 2025-07-22 RX ORDER — PROCHLORPERAZINE MALEATE 10 MG
10 TABLET ORAL EVERY 6 HOURS PRN
Status: DISCONTINUED | OUTPATIENT
Start: 2025-07-22 | End: 2025-07-22 | Stop reason: HOSPADM

## 2025-07-22 RX ORDER — EPINEPHRINE 0.3 MG/.3ML
0.3 INJECTION SUBCUTANEOUS EVERY 5 MIN PRN
Status: DISCONTINUED | OUTPATIENT
Start: 2025-07-22 | End: 2025-07-22 | Stop reason: HOSPADM

## 2025-07-22 RX ORDER — FAMOTIDINE 10 MG/ML
20 INJECTION, SOLUTION INTRAVENOUS ONCE AS NEEDED
Status: DISCONTINUED | OUTPATIENT
Start: 2025-07-22 | End: 2025-07-22 | Stop reason: HOSPADM

## 2025-07-22 RX ORDER — ALBUTEROL SULFATE 0.83 MG/ML
3 SOLUTION RESPIRATORY (INHALATION) AS NEEDED
Status: DISCONTINUED | OUTPATIENT
Start: 2025-07-22 | End: 2025-07-22 | Stop reason: HOSPADM

## 2025-07-22 RX ORDER — PROCHLORPERAZINE EDISYLATE 5 MG/ML
10 INJECTION INTRAMUSCULAR; INTRAVENOUS EVERY 6 HOURS PRN
Status: DISCONTINUED | OUTPATIENT
Start: 2025-07-22 | End: 2025-07-22 | Stop reason: HOSPADM

## 2025-07-22 RX ADMIN — ATEZOLIZUMAB 1200 MG: 1200 INJECTION, SOLUTION INTRAVENOUS at 10:05

## 2025-07-22 RX ADMIN — BEVACIZUMAB-AWWB 1600 MG: 400 INJECTION, SOLUTION INTRAVENOUS at 10:45

## 2025-07-22 ASSESSMENT — PAIN SCALES - GENERAL: PAINLEVEL_OUTOF10: 0-NO PAIN

## 2025-07-22 NOTE — PROGRESS NOTES
Pt here for tecentriq/avastin infusion, tolerated without incident. Discharged in stable condition, no additional needs at this time.

## 2025-07-22 NOTE — SIGNIFICANT EVENT
Ok to proceed with treatment today per Dr Ramon, /90 manually.      07/22/25 0858   Prechemo Checklist   Has the patient been in the hospital, ED, or urgent care since last date of service No   Chemo/Immuno Consent Completed and Signed Yes   Protocol/Indications Verified Yes   Confirmed to previous date/time of medication Yes   Compared to previous dose Yes   All medications are dated accurately Yes   Pregnancy Test Negative Not applicable   Parameters Met (!) No   Reasons Parameters Not Met Other (See Comments)  (/90)   Provider Notified Yes   Provider Name Tristen   Is Patient Proceeding With Treatment? Yes   BSA/Weight-Height Verified Yes   Dose Calculations Verified (current, total, cumulative) Yes

## 2025-07-23 ENCOUNTER — APPOINTMENT (OUTPATIENT)
Dept: PHARMACY | Facility: HOSPITAL | Age: 65
End: 2025-07-23
Payer: COMMERCIAL

## 2025-08-08 ENCOUNTER — LAB (OUTPATIENT)
Dept: LAB | Facility: CLINIC | Age: 65
End: 2025-08-08
Payer: COMMERCIAL

## 2025-08-08 ENCOUNTER — OFFICE VISIT (OUTPATIENT)
Dept: HEMATOLOGY/ONCOLOGY | Facility: CLINIC | Age: 65
End: 2025-08-08
Payer: COMMERCIAL

## 2025-08-08 VITALS
OXYGEN SATURATION: 97 % | BODY MASS INDEX: 33.62 KG/M2 | SYSTOLIC BLOOD PRESSURE: 130 MMHG | TEMPERATURE: 98.6 F | HEART RATE: 85 BPM | WEIGHT: 227 LBS | DIASTOLIC BLOOD PRESSURE: 83 MMHG

## 2025-08-08 DIAGNOSIS — C22.0 HCC (HEPATOCELLULAR CARCINOMA): ICD-10-CM

## 2025-08-08 DIAGNOSIS — C22.0 HCC (HEPATOCELLULAR CARCINOMA): Primary | ICD-10-CM

## 2025-08-08 DIAGNOSIS — B18.2 CHRONIC HEPATITIS C WITHOUT HEPATIC COMA (MULTI): ICD-10-CM

## 2025-08-08 DIAGNOSIS — E78.5 DYSLIPIDEMIA ASSOCIATED WITH TYPE 2 DIABETES MELLITUS: ICD-10-CM

## 2025-08-08 DIAGNOSIS — E11.51 DIABETES MELLITUS TYPE 2 WITH PERIPHERAL ARTERY DISEASE: ICD-10-CM

## 2025-08-08 DIAGNOSIS — E11.69 DYSLIPIDEMIA ASSOCIATED WITH TYPE 2 DIABETES MELLITUS: ICD-10-CM

## 2025-08-08 DIAGNOSIS — K74.60 CIRRHOSIS OF LIVER WITHOUT ASCITES, UNSPECIFIED HEPATIC CIRRHOSIS TYPE (MULTI): ICD-10-CM

## 2025-08-08 LAB
ALBUMIN SERPL BCP-MCNC: 4.2 G/DL (ref 3.4–5)
ALP SERPL-CCNC: 65 U/L (ref 33–136)
ALT SERPL W P-5'-P-CCNC: 17 U/L (ref 10–52)
ANION GAP SERPL CALC-SCNC: 14 MMOL/L (ref 10–20)
APPEARANCE UR: CLEAR
AST SERPL W P-5'-P-CCNC: 17 U/L (ref 9–39)
BASOPHILS # BLD AUTO: 0.02 X10*3/UL (ref 0–0.1)
BASOPHILS NFR BLD AUTO: 0.3 %
BILIRUB SERPL-MCNC: 0.6 MG/DL (ref 0–1.2)
BILIRUB UR STRIP.AUTO-MCNC: NEGATIVE MG/DL
BUN SERPL-MCNC: 14 MG/DL (ref 6–23)
CALCIUM SERPL-MCNC: 9.9 MG/DL (ref 8.6–10.6)
CHLORIDE SERPL-SCNC: 102 MMOL/L (ref 98–107)
CHOLEST SERPL-MCNC: 130 MG/DL (ref 0–199)
CHOLESTEROL/HDL RATIO: 4
CO2 SERPL-SCNC: 25 MMOL/L (ref 21–32)
COLOR UR: YELLOW
CREAT SERPL-MCNC: 1.01 MG/DL (ref 0.5–1.3)
CREAT UR-MCNC: 45.8 MG/DL (ref 20–370)
EGFRCR SERPLBLD CKD-EPI 2021: 82 ML/MIN/1.73M*2
EOSINOPHIL # BLD AUTO: 0.16 X10*3/UL (ref 0–0.7)
EOSINOPHIL NFR BLD AUTO: 2.3 %
ERYTHROCYTE [DISTWIDTH] IN BLOOD BY AUTOMATED COUNT: 15.7 % (ref 11.5–14.5)
EST. AVERAGE GLUCOSE BLD GHB EST-MCNC: 131 MG/DL
GLUCOSE SERPL-MCNC: 110 MG/DL (ref 74–99)
GLUCOSE UR STRIP.AUTO-MCNC: ABNORMAL MG/DL
HBA1C MFR BLD: 6.2 % (ref ?–5.7)
HCT VFR BLD AUTO: 48.7 % (ref 41–52)
HDLC SERPL-MCNC: 32.8 MG/DL
HGB BLD-MCNC: 16 G/DL (ref 13.5–17.5)
IMM GRANULOCYTES # BLD AUTO: 0.04 X10*3/UL (ref 0–0.7)
IMM GRANULOCYTES NFR BLD AUTO: 0.6 % (ref 0–0.9)
KETONES UR STRIP.AUTO-MCNC: NEGATIVE MG/DL
LDLC SERPL CALC-MCNC: 55 MG/DL
LEUKOCYTE ESTERASE UR QL STRIP.AUTO: NEGATIVE
LYMPHOCYTES # BLD AUTO: 0.91 X10*3/UL (ref 1.2–4.8)
LYMPHOCYTES NFR BLD AUTO: 13.3 %
MCH RBC QN AUTO: 32.3 PG (ref 26–34)
MCHC RBC AUTO-ENTMCNC: 32.9 G/DL (ref 32–36)
MCV RBC AUTO: 98 FL (ref 80–100)
MICROALBUMIN UR-MCNC: <7 MG/L
MICROALBUMIN/CREAT UR: NORMAL MG/G{CREAT}
MONOCYTES # BLD AUTO: 0.72 X10*3/UL (ref 0.1–1)
MONOCYTES NFR BLD AUTO: 10.5 %
NEUTROPHILS # BLD AUTO: 5.01 X10*3/UL (ref 1.2–7.7)
NEUTROPHILS NFR BLD AUTO: 73 %
NITRITE UR QL STRIP.AUTO: NEGATIVE
NON HDL CHOLESTEROL: 97 MG/DL (ref 0–149)
NRBC BLD-RTO: ABNORMAL /100{WBCS}
PH UR STRIP.AUTO: 6 [PH]
PLATELET # BLD AUTO: 147 X10*3/UL (ref 150–450)
POTASSIUM SERPL-SCNC: 4.8 MMOL/L (ref 3.5–5.3)
PROT SERPL-MCNC: 7.5 G/DL (ref 6.4–8.2)
PROT UR STRIP.AUTO-MCNC: NEGATIVE MG/DL
RBC # BLD AUTO: 4.96 X10*6/UL (ref 4.5–5.9)
RBC # UR STRIP.AUTO: NEGATIVE MG/DL
SODIUM SERPL-SCNC: 136 MMOL/L (ref 136–145)
SP GR UR STRIP.AUTO: 1.01
TRIGL SERPL-MCNC: 210 MG/DL (ref 0–149)
UROBILINOGEN UR STRIP.AUTO-MCNC: 0.2 MG/DL
VLDL: 42 MG/DL (ref 0–40)
WBC # BLD AUTO: 6.9 X10*3/UL (ref 4.4–11.3)

## 2025-08-08 PROCEDURE — 3075F SYST BP GE 130 - 139MM HG: CPT | Performed by: INTERNAL MEDICINE

## 2025-08-08 PROCEDURE — 81003 URINALYSIS AUTO W/O SCOPE: CPT

## 2025-08-08 PROCEDURE — 1159F MED LIST DOCD IN RCRD: CPT | Performed by: INTERNAL MEDICINE

## 2025-08-08 PROCEDURE — 83036 HEMOGLOBIN GLYCOSYLATED A1C: CPT

## 2025-08-08 PROCEDURE — 87522 HEPATITIS C REVRS TRNSCRPJ: CPT

## 2025-08-08 PROCEDURE — 82043 UR ALBUMIN QUANTITATIVE: CPT

## 2025-08-08 PROCEDURE — 3079F DIAST BP 80-89 MM HG: CPT | Performed by: INTERNAL MEDICINE

## 2025-08-08 PROCEDURE — 99215 OFFICE O/P EST HI 40 MIN: CPT | Performed by: INTERNAL MEDICINE

## 2025-08-08 PROCEDURE — 36415 COLL VENOUS BLD VENIPUNCTURE: CPT

## 2025-08-08 PROCEDURE — 80061 LIPID PANEL: CPT

## 2025-08-08 PROCEDURE — 85025 COMPLETE CBC W/AUTO DIFF WBC: CPT

## 2025-08-08 PROCEDURE — 4010F ACE/ARB THERAPY RXD/TAKEN: CPT | Performed by: INTERNAL MEDICINE

## 2025-08-08 PROCEDURE — 80053 COMPREHEN METABOLIC PANEL: CPT

## 2025-08-08 PROCEDURE — 1126F AMNT PAIN NOTED NONE PRSNT: CPT | Performed by: INTERNAL MEDICINE

## 2025-08-08 RX ORDER — ALBUTEROL SULFATE 0.83 MG/ML
3 SOLUTION RESPIRATORY (INHALATION) AS NEEDED
OUTPATIENT
Start: 2025-08-12

## 2025-08-08 RX ORDER — PROCHLORPERAZINE MALEATE 10 MG
10 TABLET ORAL EVERY 6 HOURS PRN
OUTPATIENT
Start: 2025-08-12

## 2025-08-08 RX ORDER — PROCHLORPERAZINE EDISYLATE 5 MG/ML
10 INJECTION INTRAMUSCULAR; INTRAVENOUS EVERY 6 HOURS PRN
OUTPATIENT
Start: 2025-08-12

## 2025-08-08 RX ORDER — FAMOTIDINE 10 MG/ML
20 INJECTION, SOLUTION INTRAVENOUS ONCE AS NEEDED
OUTPATIENT
Start: 2025-08-12

## 2025-08-08 RX ORDER — DIPHENHYDRAMINE HYDROCHLORIDE 50 MG/ML
50 INJECTION, SOLUTION INTRAMUSCULAR; INTRAVENOUS AS NEEDED
OUTPATIENT
Start: 2025-08-12

## 2025-08-08 RX ORDER — EPINEPHRINE 0.3 MG/.3ML
0.3 INJECTION SUBCUTANEOUS EVERY 5 MIN PRN
OUTPATIENT
Start: 2025-08-12

## 2025-08-08 ASSESSMENT — ENCOUNTER SYMPTOMS
EYES NEGATIVE: 1
FEVER: 0
ABDOMINAL PAIN: 0
RESPIRATORY NEGATIVE: 1
NEUROLOGICAL NEGATIVE: 1
CHILLS: 0
SLEEP DISTURBANCE: 0
ENDOCRINE NEGATIVE: 1
NAUSEA: 1
DIARRHEA: 1
VOMITING: 0
MUSCULOSKELETAL NEGATIVE: 1
FATIGUE: 1
HEMATOLOGIC/LYMPHATIC NEGATIVE: 1
CARDIOVASCULAR NEGATIVE: 1

## 2025-08-08 ASSESSMENT — PAIN SCALES - GENERAL: PAINLEVEL_OUTOF10: 0-NO PAIN

## 2025-08-08 NOTE — PROGRESS NOTES
Patient ID: Hemant Ness is a 64 y.o. male.  Date of Service: 08/08/25  Referring Physician: Tai Ramon MD  [id number]  [place] [id number]  Primary Care Provider: Jose Juan Mukherjee DO      Subjective    Oncology History   HCC (hepatocellular carcinoma)   6/2024 Initial Diagnosis    Hepatocellular carcinoma  History of alcohol use and hepatitis C and cirrhosis was found to have 6.6 cm lesion in the left hepatic lobe.  Biopsy of the liver lesion on 6/17/2024 was consistent with moderately differentiated hepatocellular cancer in the background of cirrhosis.  aFP was 35 on 6/11/2024 9/16/2024 Procedure    Y90 radioembolization to segment 8 of liver     10/29/2024 Procedure    Y90 radioembolization of the posterior branch of right hepatic artery     2/19/2025 Recurrence    MRI liver showed multifocal bilobar liver lesions with largest measuring 2.8 cm in segment 3     4/29/2025 -  Chemotherapy    Atezolizumab + Bevacizumab, 21 Day Cycles         HPI    Patient is receiving atezolizumab plus bevacizumab.  He does report fatigue and diarrhea for 1 to 2 weeks after receiving treatment.  His diarrhea is improved with supportive care management. He denies any other new complaint.      Review of Systems   Constitutional:  Positive for fatigue. Negative for chills and fever.   HENT:  Negative.     Eyes: Negative.    Respiratory: Negative.     Cardiovascular: Negative.    Gastrointestinal:  Positive for diarrhea and nausea. Negative for abdominal pain and vomiting.   Endocrine: Negative.    Genitourinary: Negative.     Musculoskeletal: Negative.    Skin:  Positive for itching.   Neurological: Negative.    Hematological: Negative.    Psychiatric/Behavioral:  Negative for sleep disturbance.    All other systems reviewed and are negative.         Patient Active Problem List   Diagnosis    Type 2 diabetes mellitus with hyperglycemia, without long-term current use of insulin    Actinic keratoses    BPH with  elevated PSA    CAD (coronary artery disease)    Current smoker    Dyslipidemia associated with type 2 diabetes mellitus    Hypertensive heart disease with chronic diastolic congestive heart failure    Sleep-disordered breathing    Uncomplicated alcohol dependence (Multi)    Diabetes mellitus type 2 with peripheral artery disease    Cirrhosis of liver (Multi)    Situational anxiety    Adenomatous polyp of ascending colon    Acute cervical radiculopathy    HCC (hepatocellular carcinoma)    Hepatitis A and hepatitis B vaccine administered    Class 2 severe obesity due to excess calories with serious comorbidity and body mass index (BMI) of 35.0 to 35.9 in adult    Stented coronary artery    Anticoagulant long-term use     Past Medical History:   Diagnosis Date    Chronic hepatitis C without hepatic coma (Multi) 04/15/2024    Cirrhosis (Multi)     Coronary artery disease     Diabetes mellitus (Multi)     Hepatitis C     Hyperlipidemia, unspecified 12/17/2021    Dyslipidemia    Hypertension     Personal history of other endocrine, nutritional and metabolic disease 12/17/2021    History of diabetes mellitus    Personal history of other endocrine, nutritional and metabolic disease 12/17/2021    History of type 2 diabetes mellitus    Personal history of other endocrine, nutritional and metabolic disease 04/08/2022    History of type 2 diabetes mellitus    Personal history of other specified conditions 09/10/2021    History of chest pain    STEMI (ST elevation myocardial infarction) (Multi) 09/07/2023     Past Surgical History:   Procedure Laterality Date    OTHER SURGICAL HISTORY  07/23/2021    Cardiac catheterization with stent placement    OTHER SURGICAL HISTORY  07/29/2021    Cyst excision    OTHER SURGICAL HISTORY  07/29/2021    Cholecystectomy    OTHER SURGICAL HISTORY  07/29/2021    Shoulder surgery     Family History   Family history unknown: Yes       Current Outpatient Medications:     aspirin 81 mg EC tablet,  Take 1 tablet (81 mg) by mouth once daily., Disp: , Rfl:     cyanocobalamin (Vitamin B-12) 1,000 mcg tablet, Take 1 tablet (1,000 mcg) by mouth once daily., Disp: , Rfl:     diphenoxylate-atropine (LomotiL) 2.5-0.025 mg tablet, Take 1 tablet by mouth 4 times a day as needed for diarrhea., Disp: 30 tablet, Rfl: 1    dulaglutide (Trulicity) 4.5 mg/0.5 mL pen injector, INJECT ONE PEN (4.5 MG) UNDER THE SKIN ONCE WEEKLY, Disp: 2 mL, Rfl: 11    hydrocortisone 1 % lotion, Apply topically 2 times a day., Disp: 96 g, Rfl: 2    loperamide (Imodium) 2 mg capsule, Take 2 capsules (4 mg) by mouth with the first episode of diarrhea and 1 capsule (2 mg) by mouth with any additional episodes. Maximum 8 capsules (16 mg) per day., Disp: 100 capsule, Rfl: 2    LORazepam (Ativan) 0.5 mg tablet, Take 1 tablet (0.5 mg) by mouth if needed (for MRI testing (claustrophobia))., Disp: , Rfl:     losartan (Cozaar) 100 mg tablet, TAKE ONE TABLET BY MOUTH ONCE EVERY DAY, Disp: 90 tablet, Rfl: 0    metoprolol succinate XL (Toprol-XL) 100 mg 24 hr tablet, TAKE ONE TABLET BY MOUTH ONCE DAILY. DO NOT CRUSH OR CHEW, Disp: 90 tablet, Rfl: 3    nitroglycerin (Nitrostat) 0.4 mg SL tablet, Place under the tongue., Disp: , Rfl:     OneTouch Delica Plus Lancet 30 gauge misc, , Disp: , Rfl:     OneTouch Ultra Test strip, , Disp: , Rfl:     OneTouch Ultra2 Meter misc, , Disp: , Rfl:     pantoprazole (ProtoNix) 40 mg EC tablet, Take 1 tablet (40 mg) by mouth once daily. Do not crush, chew, or split., Disp: 30 tablet, Rfl: 5    prochlorperazine (Compazine) 10 mg tablet, Take 1 tablet (10 mg) by mouth every 6 hours if needed for nausea or vomiting., Disp: 30 tablet, Rfl: 5    rosuvastatin (Crestor) 10 mg tablet, TAKE ONE TABLET BY MOUTH once DAILY, Disp: 100 tablet, Rfl: 0    empagliflozin (Jardiance) 25 mg, TAKE 1 TABLET (25 MG) BY MOUTH ONCE DAILY., Disp: 90 tablet, Rfl: 3    gabapentin (Neurontin) 100 mg capsule, Take 1 capsule (100 mg) by mouth once daily  at bedtime., Disp: 90 capsule, Rfl: 3    traZODone (Desyrel) 50 mg tablet, Take 1 tablet (50 mg) by mouth once daily at bedtime., Disp: 30 tablet, Rfl: 0      Objective   BSA: 2.24 meters squared  /83 (BP Location: Right arm, Patient Position: Sitting, BP Cuff Size: Adult)   Pulse 85   Temp 37 °C (98.6 °F) (Temporal)   Wt 103 kg (227 lb)   SpO2 97%   BMI 33.62 kg/m²     Performance Status:  Symptomatic, but fully ambulatory     Physical Exam  Vitals reviewed.   Constitutional:       General: He is not in acute distress.     Appearance: Normal appearance.   HENT:      Head: Normocephalic and atraumatic.      Mouth/Throat:      Mouth: Mucous membranes are moist.      Pharynx: Oropharynx is clear.     Eyes:      Conjunctiva/sclera: Conjunctivae normal.      Pupils: Pupils are equal, round, and reactive to light.       Cardiovascular:      Rate and Rhythm: Normal rate and regular rhythm.      Pulses: Normal pulses.      Heart sounds: Normal heart sounds.   Pulmonary:      Effort: Pulmonary effort is normal.      Breath sounds: Normal breath sounds.   Abdominal:      General: Bowel sounds are normal.      Palpations: Abdomen is soft.      Tenderness: There is no abdominal tenderness.     Musculoskeletal:         General: Normal range of motion.      Cervical back: Normal range of motion and neck supple.     Skin:     General: Skin is warm and dry.     Neurological:      Mental Status: He is alert and oriented to person, place, and time.     Psychiatric:         Mood and Affect: Mood normal.         Behavior: Behavior normal.         === 07/11/25 ===    CT CHEST WO IV CONTRAST    - Impression -  1. No lung pathology seen. No solid nodule.  2. Liver pathology better seen on dedicated CT of the liver from the  same day    MACRO:  None    Signed by: Butch Covarrubias 7/13/2025 8:11 AM  Dictation workstation:   [id number]        Assessment/Plan      #1 cirrhosis related to hepatitis C, treated and alcohol use  #2  multifocal HCC    Patient is a 64 y.o. male with cirrhosis related to treated hepatitis C and alcohol use.  Patient presented with multifocal HCC. He has been treated with Y 90 x 2 in the past.  Unfortunately, he had recurrent multifocal disease.  Tumor board recommendation was to consider systemic therapy.  He  started treatment with atezolizumab plus bevacizumab on 4/29/25.  His scans show stable disease with some treatment response.    Patient will receive cycle #6 of atezolizumab plus bevacizumab today.  I discussed the role of supportive care measures.  He will take Imodium for diarrhea.  He will return for evaluation prior to cycle #7.  We will also order restaging scan after seventh cycle.    Plan was discussed in detail with the patient and he was in agreement with the plan of care.  Patient knows to call with any issues or concerns.       Tai Ramon MD

## 2025-08-10 LAB
HCV RNA SERPL NAA+PROBE-ACNC: NOT DETECTED K[IU]/ML
HCV RNA SERPL NAA+PROBE-LOG IU: NORMAL {LOG_IU}/ML

## 2025-08-11 DIAGNOSIS — E11.51 DIABETES MELLITUS TYPE 2 WITH PERIPHERAL ARTERY DISEASE: ICD-10-CM

## 2025-08-11 RX ORDER — GABAPENTIN 100 MG/1
100 CAPSULE ORAL NIGHTLY
Qty: 90 CAPSULE | Refills: 0 | Status: SHIPPED | OUTPATIENT
Start: 2025-08-11

## 2025-08-12 ENCOUNTER — INFUSION (OUTPATIENT)
Dept: HEMATOLOGY/ONCOLOGY | Facility: CLINIC | Age: 65
End: 2025-08-12
Payer: COMMERCIAL

## 2025-08-12 VITALS
SYSTOLIC BLOOD PRESSURE: 128 MMHG | DIASTOLIC BLOOD PRESSURE: 84 MMHG | WEIGHT: 227.9 LBS | HEART RATE: 82 BPM | TEMPERATURE: 98.1 F | BODY MASS INDEX: 33.75 KG/M2 | RESPIRATION RATE: 16 BRPM | OXYGEN SATURATION: 96 % | HEIGHT: 69 IN

## 2025-08-12 DIAGNOSIS — C22.0 HCC (HEPATOCELLULAR CARCINOMA): ICD-10-CM

## 2025-08-12 PROCEDURE — 96417 CHEMO IV INFUS EACH ADDL SEQ: CPT

## 2025-08-12 PROCEDURE — 2500000004 HC RX 250 GENERAL PHARMACY W/ HCPCS (ALT 636 FOR OP/ED): Performed by: INTERNAL MEDICINE

## 2025-08-12 PROCEDURE — 96413 CHEMO IV INFUSION 1 HR: CPT

## 2025-08-12 RX ORDER — DIPHENHYDRAMINE HYDROCHLORIDE 50 MG/ML
50 INJECTION, SOLUTION INTRAMUSCULAR; INTRAVENOUS AS NEEDED
Status: DISCONTINUED | OUTPATIENT
Start: 2025-08-12 | End: 2025-08-12 | Stop reason: HOSPADM

## 2025-08-12 RX ORDER — PROCHLORPERAZINE EDISYLATE 5 MG/ML
10 INJECTION INTRAMUSCULAR; INTRAVENOUS EVERY 6 HOURS PRN
Status: DISCONTINUED | OUTPATIENT
Start: 2025-08-12 | End: 2025-08-12 | Stop reason: HOSPADM

## 2025-08-12 RX ORDER — ALBUTEROL SULFATE 0.83 MG/ML
3 SOLUTION RESPIRATORY (INHALATION) AS NEEDED
Status: DISCONTINUED | OUTPATIENT
Start: 2025-08-12 | End: 2025-08-12 | Stop reason: HOSPADM

## 2025-08-12 RX ORDER — EPINEPHRINE 0.3 MG/.3ML
0.3 INJECTION SUBCUTANEOUS EVERY 5 MIN PRN
Status: DISCONTINUED | OUTPATIENT
Start: 2025-08-12 | End: 2025-08-12 | Stop reason: HOSPADM

## 2025-08-12 RX ORDER — PROCHLORPERAZINE MALEATE 10 MG
10 TABLET ORAL EVERY 6 HOURS PRN
Status: DISCONTINUED | OUTPATIENT
Start: 2025-08-12 | End: 2025-08-12 | Stop reason: HOSPADM

## 2025-08-12 RX ORDER — FAMOTIDINE 10 MG/ML
20 INJECTION, SOLUTION INTRAVENOUS ONCE AS NEEDED
Status: DISCONTINUED | OUTPATIENT
Start: 2025-08-12 | End: 2025-08-12 | Stop reason: HOSPADM

## 2025-08-12 RX ADMIN — ATEZOLIZUMAB 1200 MG: 1200 INJECTION, SOLUTION INTRAVENOUS at 11:04

## 2025-08-12 RX ADMIN — BEVACIZUMAB-AWWB 1600 MG: 400 INJECTION, SOLUTION INTRAVENOUS at 11:43

## 2025-08-12 ASSESSMENT — PAIN SCALES - GENERAL: PAINLEVEL_OUTOF10: 0-NO PAIN

## 2025-08-26 PROCEDURE — RXMED WILLOW AMBULATORY MEDICATION CHARGE

## 2025-08-27 ENCOUNTER — PHARMACY VISIT (OUTPATIENT)
Dept: PHARMACY | Facility: CLINIC | Age: 65
End: 2025-08-27
Payer: COMMERCIAL

## 2025-08-29 ENCOUNTER — OFFICE VISIT (OUTPATIENT)
Dept: HEMATOLOGY/ONCOLOGY | Facility: CLINIC | Age: 65
End: 2025-08-29
Payer: COMMERCIAL

## 2025-08-29 ENCOUNTER — LAB (OUTPATIENT)
Dept: LAB | Facility: CLINIC | Age: 65
End: 2025-08-29
Payer: COMMERCIAL

## 2025-08-29 VITALS
BODY MASS INDEX: 33.33 KG/M2 | TEMPERATURE: 97.5 F | DIASTOLIC BLOOD PRESSURE: 87 MMHG | WEIGHT: 225 LBS | SYSTOLIC BLOOD PRESSURE: 141 MMHG | OXYGEN SATURATION: 97 % | RESPIRATION RATE: 18 BRPM | HEART RATE: 91 BPM

## 2025-08-29 DIAGNOSIS — C22.0 HCC (HEPATOCELLULAR CARCINOMA): ICD-10-CM

## 2025-08-29 PROCEDURE — 3062F POS MACROALBUMINURIA REV: CPT

## 2025-08-29 PROCEDURE — 3044F HG A1C LEVEL LT 7.0%: CPT

## 2025-08-29 PROCEDURE — 1159F MED LIST DOCD IN RCRD: CPT

## 2025-08-29 PROCEDURE — 4010F ACE/ARB THERAPY RXD/TAKEN: CPT

## 2025-08-29 PROCEDURE — 99215 OFFICE O/P EST HI 40 MIN: CPT

## 2025-08-29 PROCEDURE — 3079F DIAST BP 80-89 MM HG: CPT

## 2025-08-29 PROCEDURE — 3048F LDL-C <100 MG/DL: CPT

## 2025-08-29 PROCEDURE — 1126F AMNT PAIN NOTED NONE PRSNT: CPT

## 2025-08-29 PROCEDURE — 1160F RVW MEDS BY RX/DR IN RCRD: CPT

## 2025-08-29 PROCEDURE — 3077F SYST BP >= 140 MM HG: CPT

## 2025-08-29 RX ORDER — ALBUTEROL SULFATE 0.83 MG/ML
3 SOLUTION RESPIRATORY (INHALATION) AS NEEDED
OUTPATIENT
Start: 2025-09-02

## 2025-08-29 RX ORDER — FAMOTIDINE 10 MG/ML
20 INJECTION, SOLUTION INTRAVENOUS ONCE AS NEEDED
OUTPATIENT
Start: 2025-09-02

## 2025-08-29 RX ORDER — DIPHENHYDRAMINE HYDROCHLORIDE 50 MG/ML
50 INJECTION, SOLUTION INTRAMUSCULAR; INTRAVENOUS AS NEEDED
OUTPATIENT
Start: 2025-09-02

## 2025-08-29 RX ORDER — PROCHLORPERAZINE EDISYLATE 5 MG/ML
10 INJECTION INTRAMUSCULAR; INTRAVENOUS EVERY 6 HOURS PRN
OUTPATIENT
Start: 2025-09-02

## 2025-08-29 RX ORDER — PROCHLORPERAZINE MALEATE 10 MG
10 TABLET ORAL EVERY 6 HOURS PRN
OUTPATIENT
Start: 2025-09-02

## 2025-08-29 RX ORDER — EPINEPHRINE 0.3 MG/.3ML
0.3 INJECTION SUBCUTANEOUS EVERY 5 MIN PRN
OUTPATIENT
Start: 2025-09-02

## 2025-08-29 ASSESSMENT — ENCOUNTER SYMPTOMS
ENDOCRINE NEGATIVE: 1
NEUROLOGICAL NEGATIVE: 1
EYES NEGATIVE: 1
DIARRHEA: 1
CARDIOVASCULAR NEGATIVE: 1
HEMATOLOGIC/LYMPHATIC NEGATIVE: 1
NAUSEA: 1
SLEEP DISTURBANCE: 0
RESPIRATORY NEGATIVE: 1
CHILLS: 0
VOMITING: 0
FEVER: 0
FATIGUE: 1
ABDOMINAL PAIN: 0
MUSCULOSKELETAL NEGATIVE: 1

## 2025-08-29 ASSESSMENT — PAIN SCALES - GENERAL: PAINLEVEL_OUTOF10: 0-NO PAIN

## 2025-09-03 ENCOUNTER — APPOINTMENT (OUTPATIENT)
Dept: PRIMARY CARE | Facility: CLINIC | Age: 65
End: 2025-09-03
Payer: COMMERCIAL

## 2025-09-03 PROBLEM — E66.811 CLASS 1 OBESITY DUE TO EXCESS CALORIES WITH SERIOUS COMORBIDITY AND BODY MASS INDEX (BMI) OF 33.0 TO 33.9 IN ADULT: Status: ACTIVE | Noted: 2025-03-04

## 2025-09-03 PROBLEM — M54.12 ACUTE CERVICAL RADICULOPATHY: Status: RESOLVED | Noted: 2024-08-15 | Resolved: 2025-09-03

## 2025-09-03 PROBLEM — M54.12 CHRONIC CERVICAL RADICULOPATHY: Status: ACTIVE | Noted: 2025-09-03

## 2025-09-03 PROBLEM — E66.09 CLASS 1 OBESITY DUE TO EXCESS CALORIES WITH SERIOUS COMORBIDITY AND BODY MASS INDEX (BMI) OF 33.0 TO 33.9 IN ADULT: Status: ACTIVE | Noted: 2025-03-04

## 2025-09-04 ENCOUNTER — INFUSION (OUTPATIENT)
Dept: HEMATOLOGY/ONCOLOGY | Facility: CLINIC | Age: 65
End: 2025-09-04
Payer: COMMERCIAL

## 2025-09-04 VITALS
HEIGHT: 69 IN | WEIGHT: 224.5 LBS | TEMPERATURE: 97.7 F | DIASTOLIC BLOOD PRESSURE: 80 MMHG | RESPIRATION RATE: 16 BRPM | HEART RATE: 75 BPM | OXYGEN SATURATION: 96 % | BODY MASS INDEX: 33.25 KG/M2 | SYSTOLIC BLOOD PRESSURE: 118 MMHG

## 2025-09-04 DIAGNOSIS — C22.0 HCC (HEPATOCELLULAR CARCINOMA): ICD-10-CM

## 2025-09-04 PROCEDURE — 96417 CHEMO IV INFUS EACH ADDL SEQ: CPT

## 2025-09-04 PROCEDURE — 2500000004 HC RX 250 GENERAL PHARMACY W/ HCPCS (ALT 636 FOR OP/ED): Mod: JZ,TB

## 2025-09-04 PROCEDURE — 96413 CHEMO IV INFUSION 1 HR: CPT

## 2025-09-04 RX ORDER — PROCHLORPERAZINE MALEATE 10 MG
10 TABLET ORAL EVERY 6 HOURS PRN
Status: DISCONTINUED | OUTPATIENT
Start: 2025-09-04 | End: 2025-09-04 | Stop reason: HOSPADM

## 2025-09-04 RX ORDER — EPINEPHRINE 0.3 MG/.3ML
0.3 INJECTION SUBCUTANEOUS EVERY 5 MIN PRN
Status: DISCONTINUED | OUTPATIENT
Start: 2025-09-04 | End: 2025-09-04 | Stop reason: HOSPADM

## 2025-09-04 RX ORDER — PROCHLORPERAZINE EDISYLATE 5 MG/ML
10 INJECTION INTRAMUSCULAR; INTRAVENOUS EVERY 6 HOURS PRN
Status: DISCONTINUED | OUTPATIENT
Start: 2025-09-04 | End: 2025-09-04 | Stop reason: HOSPADM

## 2025-09-04 RX ORDER — FAMOTIDINE 10 MG/ML
20 INJECTION, SOLUTION INTRAVENOUS ONCE AS NEEDED
Status: DISCONTINUED | OUTPATIENT
Start: 2025-09-04 | End: 2025-09-04 | Stop reason: HOSPADM

## 2025-09-04 RX ORDER — ALBUTEROL SULFATE 0.83 MG/ML
3 SOLUTION RESPIRATORY (INHALATION) AS NEEDED
Status: DISCONTINUED | OUTPATIENT
Start: 2025-09-04 | End: 2025-09-04 | Stop reason: HOSPADM

## 2025-09-04 RX ORDER — DIPHENHYDRAMINE HYDROCHLORIDE 50 MG/ML
50 INJECTION, SOLUTION INTRAMUSCULAR; INTRAVENOUS AS NEEDED
Status: DISCONTINUED | OUTPATIENT
Start: 2025-09-04 | End: 2025-09-04 | Stop reason: HOSPADM

## 2025-09-04 RX ORDER — HEPARIN 100 UNIT/ML
500 SYRINGE INTRAVENOUS AS NEEDED
OUTPATIENT
Start: 2025-09-04

## 2025-09-04 RX ORDER — HEPARIN SODIUM,PORCINE/PF 10 UNIT/ML
50 SYRINGE (ML) INTRAVENOUS AS NEEDED
OUTPATIENT
Start: 2025-09-04

## 2025-09-04 RX ADMIN — ATEZOLIZUMAB 1200 MG: 1200 INJECTION, SOLUTION INTRAVENOUS at 09:39

## 2025-09-04 RX ADMIN — BEVACIZUMAB-AWWB 1600 MG: 400 INJECTION, SOLUTION INTRAVENOUS at 10:22

## 2025-09-04 ASSESSMENT — PAIN SCALES - GENERAL: PAINLEVEL_OUTOF10: 0-NO PAIN

## 2025-12-16 ENCOUNTER — APPOINTMENT (OUTPATIENT)
Dept: PRIMARY CARE | Facility: CLINIC | Age: 65
End: 2025-12-16
Payer: COMMERCIAL